# Patient Record
Sex: FEMALE | Race: WHITE | NOT HISPANIC OR LATINO | Employment: OTHER | URBAN - METROPOLITAN AREA
[De-identification: names, ages, dates, MRNs, and addresses within clinical notes are randomized per-mention and may not be internally consistent; named-entity substitution may affect disease eponyms.]

---

## 2017-04-13 ENCOUNTER — HOSPITAL ENCOUNTER (OUTPATIENT)
Dept: LAB | Facility: MEDICAL CENTER | Age: 82
End: 2017-04-13
Attending: FAMILY MEDICINE
Payer: MEDICARE

## 2017-04-13 LAB
ALBUMIN SERPL BCP-MCNC: 4.1 G/DL (ref 3.2–4.9)
ALBUMIN/GLOB SERPL: 1.5 G/DL
ALP SERPL-CCNC: 66 U/L (ref 30–99)
ALT SERPL-CCNC: 16 U/L (ref 2–50)
ANION GAP SERPL CALC-SCNC: 10 MMOL/L (ref 0–11.9)
AST SERPL-CCNC: 22 U/L (ref 12–45)
BASOPHILS # BLD AUTO: 0.3 % (ref 0–1.8)
BASOPHILS # BLD: 0.02 K/UL (ref 0–0.12)
BILIRUB SERPL-MCNC: 0.8 MG/DL (ref 0.1–1.5)
BUN SERPL-MCNC: 26 MG/DL (ref 8–22)
CALCIUM SERPL-MCNC: 9.4 MG/DL (ref 8.5–10.5)
CHLORIDE SERPL-SCNC: 106 MMOL/L (ref 96–112)
CHOLEST SERPL-MCNC: 163 MG/DL (ref 100–199)
CO2 SERPL-SCNC: 23 MMOL/L (ref 20–33)
CREAT SERPL-MCNC: 1.22 MG/DL (ref 0.5–1.4)
EOSINOPHIL # BLD AUTO: 0.08 K/UL (ref 0–0.51)
EOSINOPHIL NFR BLD: 1.1 % (ref 0–6.9)
ERYTHROCYTE [DISTWIDTH] IN BLOOD BY AUTOMATED COUNT: 42.6 FL (ref 35.9–50)
EST. AVERAGE GLUCOSE BLD GHB EST-MCNC: 111 MG/DL
GFR SERPL CREATININE-BSD FRML MDRD: 42 ML/MIN/1.73 M 2
GLOBULIN SER CALC-MCNC: 2.7 G/DL (ref 1.9–3.5)
GLUCOSE SERPL-MCNC: 112 MG/DL (ref 65–99)
HBA1C MFR BLD: 5.5 % (ref 0–5.6)
HCT VFR BLD AUTO: 43.9 % (ref 37–47)
HDLC SERPL-MCNC: 54 MG/DL
HGB BLD-MCNC: 14.2 G/DL (ref 12–16)
IMM GRANULOCYTES # BLD AUTO: 0.01 K/UL (ref 0–0.11)
IMM GRANULOCYTES NFR BLD AUTO: 0.1 % (ref 0–0.9)
LDLC SERPL CALC-MCNC: 78 MG/DL
LYMPHOCYTES # BLD AUTO: 2.58 K/UL (ref 1–4.8)
LYMPHOCYTES NFR BLD: 36.1 % (ref 22–41)
MCH RBC QN AUTO: 29.5 PG (ref 27–33)
MCHC RBC AUTO-ENTMCNC: 32.3 G/DL (ref 33.6–35)
MCV RBC AUTO: 91.3 FL (ref 81.4–97.8)
MONOCYTES # BLD AUTO: 0.89 K/UL (ref 0–0.85)
MONOCYTES NFR BLD AUTO: 12.4 % (ref 0–13.4)
NEUTROPHILS # BLD AUTO: 3.57 K/UL (ref 2–7.15)
NEUTROPHILS NFR BLD: 50 % (ref 44–72)
NRBC # BLD AUTO: 0 K/UL
NRBC BLD AUTO-RTO: 0 /100 WBC
PLATELET # BLD AUTO: 271 K/UL (ref 164–446)
PMV BLD AUTO: 11.7 FL (ref 9–12.9)
POTASSIUM SERPL-SCNC: 4.2 MMOL/L (ref 3.6–5.5)
PROT SERPL-MCNC: 6.8 G/DL (ref 6–8.2)
RBC # BLD AUTO: 4.81 M/UL (ref 4.2–5.4)
SODIUM SERPL-SCNC: 139 MMOL/L (ref 135–145)
TRIGL SERPL-MCNC: 157 MG/DL (ref 0–149)
TSH SERPL DL<=0.005 MIU/L-ACNC: 2.25 UIU/ML (ref 0.3–3.7)
WBC # BLD AUTO: 7.2 K/UL (ref 4.8–10.8)

## 2017-04-13 PROCEDURE — 85025 COMPLETE CBC W/AUTO DIFF WBC: CPT

## 2017-04-13 PROCEDURE — 36415 COLL VENOUS BLD VENIPUNCTURE: CPT

## 2017-04-13 PROCEDURE — 83036 HEMOGLOBIN GLYCOSYLATED A1C: CPT

## 2017-04-13 PROCEDURE — 84443 ASSAY THYROID STIM HORMONE: CPT

## 2017-04-13 PROCEDURE — 80053 COMPREHEN METABOLIC PANEL: CPT

## 2017-04-13 PROCEDURE — 80061 LIPID PANEL: CPT

## 2017-09-27 ENCOUNTER — HOSPITAL ENCOUNTER (OUTPATIENT)
Dept: LAB | Facility: MEDICAL CENTER | Age: 82
End: 2017-09-27
Attending: FAMILY MEDICINE
Payer: MEDICARE

## 2017-09-28 ENCOUNTER — HOSPITAL ENCOUNTER (OUTPATIENT)
Dept: LAB | Facility: MEDICAL CENTER | Age: 82
End: 2017-09-28
Attending: FAMILY MEDICINE
Payer: MEDICARE

## 2017-09-28 LAB
BASOPHILS # BLD AUTO: 0.6 % (ref 0–1.8)
BASOPHILS # BLD: 0.04 K/UL (ref 0–0.12)
CREAT UR-MCNC: 204.9 MG/DL
EOSINOPHIL # BLD AUTO: 0.09 K/UL (ref 0–0.51)
EOSINOPHIL NFR BLD: 1.4 % (ref 0–6.9)
ERYTHROCYTE [DISTWIDTH] IN BLOOD BY AUTOMATED COUNT: 47.8 FL (ref 35.9–50)
EST. AVERAGE GLUCOSE BLD GHB EST-MCNC: 123 MG/DL
HBA1C MFR BLD: 5.9 % (ref 0–5.6)
HCT VFR BLD AUTO: 43.7 % (ref 37–47)
HGB BLD-MCNC: 14.1 G/DL (ref 12–16)
IMM GRANULOCYTES # BLD AUTO: 0.02 K/UL (ref 0–0.11)
IMM GRANULOCYTES NFR BLD AUTO: 0.3 % (ref 0–0.9)
LYMPHOCYTES # BLD AUTO: 2.32 K/UL (ref 1–4.8)
LYMPHOCYTES NFR BLD: 36.3 % (ref 22–41)
MCH RBC QN AUTO: 29.4 PG (ref 27–33)
MCHC RBC AUTO-ENTMCNC: 32.3 G/DL (ref 33.6–35)
MCV RBC AUTO: 91.2 FL (ref 81.4–97.8)
MICROALBUMIN UR-MCNC: 2.5 MG/DL
MICROALBUMIN/CREAT UR: 12 MG/G (ref 0–30)
MONOCYTES # BLD AUTO: 0.65 K/UL (ref 0–0.85)
MONOCYTES NFR BLD AUTO: 10.2 % (ref 0–13.4)
NEUTROPHILS # BLD AUTO: 3.28 K/UL (ref 2–7.15)
NEUTROPHILS NFR BLD: 51.2 % (ref 44–72)
NRBC # BLD AUTO: 0 K/UL
NRBC BLD AUTO-RTO: 0 /100 WBC
PLATELET # BLD AUTO: 256 K/UL (ref 164–446)
PMV BLD AUTO: 11.1 FL (ref 9–12.9)
RBC # BLD AUTO: 4.79 M/UL (ref 4.2–5.4)
WBC # BLD AUTO: 6.4 K/UL (ref 4.8–10.8)

## 2017-09-28 PROCEDURE — 82043 UR ALBUMIN QUANTITATIVE: CPT

## 2017-09-28 PROCEDURE — 82570 ASSAY OF URINE CREATININE: CPT

## 2017-09-28 PROCEDURE — 85025 COMPLETE CBC W/AUTO DIFF WBC: CPT

## 2017-09-28 PROCEDURE — 36415 COLL VENOUS BLD VENIPUNCTURE: CPT

## 2017-09-28 PROCEDURE — 80053 COMPREHEN METABOLIC PANEL: CPT

## 2017-09-28 PROCEDURE — 83036 HEMOGLOBIN GLYCOSYLATED A1C: CPT

## 2017-09-28 PROCEDURE — 80061 LIPID PANEL: CPT

## 2017-09-29 LAB
ALBUMIN SERPL BCP-MCNC: 3.9 G/DL (ref 3.2–4.9)
ALBUMIN/GLOB SERPL: 1.5 G/DL
ALP SERPL-CCNC: 70 U/L (ref 30–99)
ALT SERPL-CCNC: 14 U/L (ref 2–50)
ANION GAP SERPL CALC-SCNC: 8 MMOL/L (ref 0–11.9)
AST SERPL-CCNC: 19 U/L (ref 12–45)
BILIRUB SERPL-MCNC: 0.6 MG/DL (ref 0.1–1.5)
BUN SERPL-MCNC: 24 MG/DL (ref 8–22)
CALCIUM SERPL-MCNC: 9.3 MG/DL (ref 8.5–10.5)
CHLORIDE SERPL-SCNC: 104 MMOL/L (ref 96–112)
CHOLEST SERPL-MCNC: 187 MG/DL (ref 100–199)
CO2 SERPL-SCNC: 25 MMOL/L (ref 20–33)
CREAT SERPL-MCNC: 1.22 MG/DL (ref 0.5–1.4)
GFR SERPL CREATININE-BSD FRML MDRD: 42 ML/MIN/1.73 M 2
GLOBULIN SER CALC-MCNC: 2.6 G/DL (ref 1.9–3.5)
GLUCOSE SERPL-MCNC: 111 MG/DL (ref 65–99)
HDLC SERPL-MCNC: 56 MG/DL
LDLC SERPL CALC-MCNC: 87 MG/DL
POTASSIUM SERPL-SCNC: 4.2 MMOL/L (ref 3.6–5.5)
PROT SERPL-MCNC: 6.5 G/DL (ref 6–8.2)
SODIUM SERPL-SCNC: 137 MMOL/L (ref 135–145)
TRIGL SERPL-MCNC: 219 MG/DL (ref 0–149)

## 2017-11-20 ENCOUNTER — HOSPITAL ENCOUNTER (OUTPATIENT)
Dept: RADIOLOGY | Facility: MEDICAL CENTER | Age: 82
End: 2017-11-20
Attending: FAMILY MEDICINE
Payer: MEDICARE

## 2017-11-20 DIAGNOSIS — Z12.31 VISIT FOR SCREENING MAMMOGRAM: ICD-10-CM

## 2017-11-20 PROCEDURE — G0202 SCR MAMMO BI INCL CAD: HCPCS

## 2018-02-28 ENCOUNTER — OFFICE VISIT (OUTPATIENT)
Dept: URGENT CARE | Facility: CLINIC | Age: 83
End: 2018-02-28
Payer: MEDICARE

## 2018-02-28 VITALS
HEIGHT: 66 IN | WEIGHT: 150.13 LBS | BODY MASS INDEX: 24.13 KG/M2 | OXYGEN SATURATION: 95 % | RESPIRATION RATE: 18 BRPM | HEART RATE: 88 BPM | SYSTOLIC BLOOD PRESSURE: 140 MMHG | TEMPERATURE: 97.7 F | DIASTOLIC BLOOD PRESSURE: 76 MMHG

## 2018-02-28 DIAGNOSIS — K13.79 ORAL PAIN OF UNKNOWN ETIOLOGY: ICD-10-CM

## 2018-02-28 PROCEDURE — 99203 OFFICE O/P NEW LOW 30 MIN: CPT | Performed by: PHYSICIAN ASSISTANT

## 2018-02-28 ASSESSMENT — ENCOUNTER SYMPTOMS
WEAKNESS: 0
NUMBNESS: 0
FEVER: 0
SWOLLEN GLANDS: 0
EYES NEGATIVE: 1
SORE THROAT: 0
CONSTITUTIONAL NEGATIVE: 1
NEUROLOGICAL NEGATIVE: 1

## 2018-02-28 NOTE — PROGRESS NOTES
"Subjective:      Jennifer Byrd is a 82 y.o. female who presents with Oral Pain (oral cavity pain, unable to chew)            Oral Pain   This is a new problem. The current episode started yesterday (oral pn; no tooth/gum/tongue/throat pain, just \"oral pain\"; no uri sx; hx same in past off/on but didn't last this long). The problem occurs constantly. The problem has been unchanged. Pertinent negatives include no fever, numbness, sore throat, swollen glands or weakness. The symptoms are aggravated by drinking and eating. She has tried rest for the symptoms. The treatment provided no relief.       Review of Systems   Constitutional: Negative.  Negative for fever.   HENT: Negative for sore throat.    Eyes: Negative.    Skin: Negative.    Neurological: Negative.  Negative for weakness and numbness.          Objective:     /76   Pulse 88   Temp 36.5 °C (97.7 °F)   Resp 18   Ht 1.676 m (5' 6\")   Wt 68.1 kg (150 lb 2.1 oz)   SpO2 95%   BMI 24.23 kg/m²      Physical Exam   Constitutional: She is oriented to person, place, and time. She appears well-developed and well-nourished. No distress.   HENT:   Head: Normocephalic.   Mouth/Throat: Oropharynx is clear and moist.   No tooth/gum/throat pain or obvious source   Eyes: EOM are normal. Pupils are equal, round, and reactive to light.   Neck: Normal range of motion. Neck supple.   Lymphadenopathy:     She has no cervical adenopathy.   Neurological: She is alert and oriented to person, place, and time.   Skin: Skin is warm and dry.   Psychiatric: She has a normal mood and affect. Her behavior is normal.   Nursing note and vitals reviewed.    Active Ambulatory Problems     Diagnosis Date Noted   • No Active Ambulatory Problems     Resolved Ambulatory Problems     Diagnosis Date Noted   • No Resolved Ambulatory Problems     No Additional Past Medical History     No current outpatient prescriptions on file prior to visit.     No current facility-administered " medications on file prior to visit.      Social History     Social History   • Marital status:      Spouse name: N/A   • Number of children: N/A   • Years of education: N/A     Occupational History   • Not on file.     Social History Main Topics   • Smoking status: Not on file   • Smokeless tobacco: Not on file   • Alcohol use Not on file   • Drug use: Unknown   • Sexual activity: Not on file     Other Topics Concern   • Not on file     Social History Narrative   • No narrative on file     History reviewed. No pertinent family history.  Patient has no allergy information on record.              Assessment/Plan:     ·  oral pain, ?etiol      · No obvious source of pain; will give t-3, mbx trial; f/u PCP; may need ENT refer.

## 2018-04-10 ENCOUNTER — HOSPITAL ENCOUNTER (OUTPATIENT)
Dept: LAB | Facility: MEDICAL CENTER | Age: 83
End: 2018-04-10
Attending: FAMILY MEDICINE
Payer: MEDICARE

## 2018-04-10 LAB
ALBUMIN SERPL BCP-MCNC: 4 G/DL (ref 3.2–4.9)
ALBUMIN/GLOB SERPL: 1.3 G/DL
ALP SERPL-CCNC: 75 U/L (ref 30–99)
ALT SERPL-CCNC: 12 U/L (ref 2–50)
ANION GAP SERPL CALC-SCNC: 12 MMOL/L (ref 0–11.9)
AST SERPL-CCNC: 19 U/L (ref 12–45)
BASOPHILS # BLD AUTO: 0.4 % (ref 0–1.8)
BASOPHILS # BLD: 0.05 K/UL (ref 0–0.12)
BILIRUB SERPL-MCNC: 0.8 MG/DL (ref 0.1–1.5)
BUN SERPL-MCNC: 20 MG/DL (ref 8–22)
CALCIUM SERPL-MCNC: 9.5 MG/DL (ref 8.5–10.5)
CHLORIDE SERPL-SCNC: 101 MMOL/L (ref 96–112)
CHOLEST SERPL-MCNC: 130 MG/DL (ref 100–199)
CO2 SERPL-SCNC: 23 MMOL/L (ref 20–33)
CREAT SERPL-MCNC: 1.55 MG/DL (ref 0.5–1.4)
CREAT UR-MCNC: 360.7 MG/DL
EOSINOPHIL # BLD AUTO: 0.14 K/UL (ref 0–0.51)
EOSINOPHIL NFR BLD: 1.2 % (ref 0–6.9)
ERYTHROCYTE [DISTWIDTH] IN BLOOD BY AUTOMATED COUNT: 49.5 FL (ref 35.9–50)
EST. AVERAGE GLUCOSE BLD GHB EST-MCNC: 111 MG/DL
GLOBULIN SER CALC-MCNC: 3 G/DL (ref 1.9–3.5)
GLUCOSE SERPL-MCNC: 109 MG/DL (ref 65–99)
HBA1C MFR BLD: 5.5 % (ref 0–5.6)
HCT VFR BLD AUTO: 43 % (ref 37–47)
HDLC SERPL-MCNC: 54 MG/DL
HGB BLD-MCNC: 13.7 G/DL (ref 12–16)
IMM GRANULOCYTES # BLD AUTO: 0.05 K/UL (ref 0–0.11)
IMM GRANULOCYTES NFR BLD AUTO: 0.4 % (ref 0–0.9)
LDLC SERPL CALC-MCNC: 51 MG/DL
LYMPHOCYTES # BLD AUTO: 2.64 K/UL (ref 1–4.8)
LYMPHOCYTES NFR BLD: 21.7 % (ref 22–41)
MCH RBC QN AUTO: 29.8 PG (ref 27–33)
MCHC RBC AUTO-ENTMCNC: 31.9 G/DL (ref 33.6–35)
MCV RBC AUTO: 93.7 FL (ref 81.4–97.8)
MICROALBUMIN UR-MCNC: 19.5 MG/DL
MICROALBUMIN/CREAT UR: 54 MG/G (ref 0–30)
MONOCYTES # BLD AUTO: 1.45 K/UL (ref 0–0.85)
MONOCYTES NFR BLD AUTO: 11.9 % (ref 0–13.4)
NEUTROPHILS # BLD AUTO: 7.83 K/UL (ref 2–7.15)
NEUTROPHILS NFR BLD: 64.4 % (ref 44–72)
NRBC # BLD AUTO: 0 K/UL
NRBC BLD-RTO: 0 /100 WBC
PLATELET # BLD AUTO: 291 K/UL (ref 164–446)
PMV BLD AUTO: 11.5 FL (ref 9–12.9)
POTASSIUM SERPL-SCNC: 4.3 MMOL/L (ref 3.6–5.5)
PROT SERPL-MCNC: 7 G/DL (ref 6–8.2)
RBC # BLD AUTO: 4.59 M/UL (ref 4.2–5.4)
SODIUM SERPL-SCNC: 136 MMOL/L (ref 135–145)
TRIGL SERPL-MCNC: 126 MG/DL (ref 0–149)
WBC # BLD AUTO: 12.2 K/UL (ref 4.8–10.8)

## 2018-04-10 PROCEDURE — 85025 COMPLETE CBC W/AUTO DIFF WBC: CPT

## 2018-04-10 PROCEDURE — 83036 HEMOGLOBIN GLYCOSYLATED A1C: CPT

## 2018-04-10 PROCEDURE — 80053 COMPREHEN METABOLIC PANEL: CPT

## 2018-04-10 PROCEDURE — 36415 COLL VENOUS BLD VENIPUNCTURE: CPT

## 2018-04-10 PROCEDURE — 80061 LIPID PANEL: CPT

## 2018-04-10 PROCEDURE — 82043 UR ALBUMIN QUANTITATIVE: CPT

## 2018-04-10 PROCEDURE — 82570 ASSAY OF URINE CREATININE: CPT

## 2018-07-02 ENCOUNTER — HOSPITAL ENCOUNTER (OUTPATIENT)
Dept: LAB | Facility: MEDICAL CENTER | Age: 83
End: 2018-07-02
Attending: FAMILY MEDICINE
Payer: MEDICARE

## 2018-07-02 LAB
BASOPHILS # BLD AUTO: 0.4 % (ref 0–1.8)
BASOPHILS # BLD: 0.03 K/UL (ref 0–0.12)
EOSINOPHIL # BLD AUTO: 0.06 K/UL (ref 0–0.51)
EOSINOPHIL NFR BLD: 0.7 % (ref 0–6.9)
ERYTHROCYTE [DISTWIDTH] IN BLOOD BY AUTOMATED COUNT: 48.8 FL (ref 35.9–50)
HCT VFR BLD AUTO: 44.2 % (ref 37–47)
HGB BLD-MCNC: 14.2 G/DL (ref 12–16)
IMM GRANULOCYTES # BLD AUTO: 0.03 K/UL (ref 0–0.11)
IMM GRANULOCYTES NFR BLD AUTO: 0.4 % (ref 0–0.9)
LYMPHOCYTES # BLD AUTO: 2.63 K/UL (ref 1–4.8)
LYMPHOCYTES NFR BLD: 32 % (ref 22–41)
MCH RBC QN AUTO: 29.9 PG (ref 27–33)
MCHC RBC AUTO-ENTMCNC: 32.1 G/DL (ref 33.6–35)
MCV RBC AUTO: 93.1 FL (ref 81.4–97.8)
MONOCYTES # BLD AUTO: 0.8 K/UL (ref 0–0.85)
MONOCYTES NFR BLD AUTO: 9.7 % (ref 0–13.4)
NEUTROPHILS # BLD AUTO: 4.67 K/UL (ref 2–7.15)
NEUTROPHILS NFR BLD: 56.8 % (ref 44–72)
NRBC # BLD AUTO: 0 K/UL
NRBC BLD-RTO: 0 /100 WBC
PLATELET # BLD AUTO: 265 K/UL (ref 164–446)
PMV BLD AUTO: 11.2 FL (ref 9–12.9)
RBC # BLD AUTO: 4.75 M/UL (ref 4.2–5.4)
T4 FREE SERPL-MCNC: 0.99 NG/DL (ref 0.53–1.43)
TSH SERPL DL<=0.005 MIU/L-ACNC: 2.33 UIU/ML (ref 0.38–5.33)
WBC # BLD AUTO: 8.2 K/UL (ref 4.8–10.8)

## 2018-07-02 PROCEDURE — 85025 COMPLETE CBC W/AUTO DIFF WBC: CPT

## 2018-07-02 PROCEDURE — 84439 ASSAY OF FREE THYROXINE: CPT

## 2018-07-02 PROCEDURE — 36415 COLL VENOUS BLD VENIPUNCTURE: CPT

## 2018-07-02 PROCEDURE — 84443 ASSAY THYROID STIM HORMONE: CPT

## 2018-11-28 ENCOUNTER — HOSPITAL ENCOUNTER (OUTPATIENT)
Dept: RADIOLOGY | Facility: MEDICAL CENTER | Age: 83
End: 2018-11-28
Attending: FAMILY MEDICINE
Payer: MEDICARE

## 2018-11-28 DIAGNOSIS — Z12.39 SCREENING BREAST EXAMINATION: ICD-10-CM

## 2018-11-28 DIAGNOSIS — N95.9 MENOPAUSAL DISORDER: ICD-10-CM

## 2018-11-28 PROCEDURE — 77067 SCR MAMMO BI INCL CAD: CPT

## 2018-11-28 PROCEDURE — 77080 DXA BONE DENSITY AXIAL: CPT

## 2019-04-08 ENCOUNTER — HOSPITAL ENCOUNTER (OUTPATIENT)
Dept: LAB | Facility: MEDICAL CENTER | Age: 84
End: 2019-04-08
Attending: FAMILY MEDICINE
Payer: MEDICARE

## 2019-04-08 LAB
ALBUMIN SERPL BCP-MCNC: 4.2 G/DL (ref 3.2–4.9)
ALBUMIN/GLOB SERPL: 1.8 G/DL
ALP SERPL-CCNC: 61 U/L (ref 30–99)
ALT SERPL-CCNC: 19 U/L (ref 2–50)
ANION GAP SERPL CALC-SCNC: 8 MMOL/L (ref 0–11.9)
AST SERPL-CCNC: 27 U/L (ref 12–45)
BASOPHILS # BLD AUTO: 0.5 % (ref 0–1.8)
BASOPHILS # BLD: 0.04 K/UL (ref 0–0.12)
BILIRUB SERPL-MCNC: 0.6 MG/DL (ref 0.1–1.5)
BUN SERPL-MCNC: 18 MG/DL (ref 8–22)
CALCIUM SERPL-MCNC: 9.1 MG/DL (ref 8.5–10.5)
CHLORIDE SERPL-SCNC: 105 MMOL/L (ref 96–112)
CHOLEST SERPL-MCNC: 172 MG/DL (ref 100–199)
CO2 SERPL-SCNC: 25 MMOL/L (ref 20–33)
CREAT SERPL-MCNC: 1.15 MG/DL (ref 0.5–1.4)
CREAT UR-MCNC: 113 MG/DL
EOSINOPHIL # BLD AUTO: 0.07 K/UL (ref 0–0.51)
EOSINOPHIL NFR BLD: 1 % (ref 0–6.9)
ERYTHROCYTE [DISTWIDTH] IN BLOOD BY AUTOMATED COUNT: 49.4 FL (ref 35.9–50)
EST. AVERAGE GLUCOSE BLD GHB EST-MCNC: 123 MG/DL
FASTING STATUS PATIENT QL REPORTED: NORMAL
GLOBULIN SER CALC-MCNC: 2.3 G/DL (ref 1.9–3.5)
GLUCOSE SERPL-MCNC: 107 MG/DL (ref 65–99)
HBA1C MFR BLD: 5.9 % (ref 0–5.6)
HCT VFR BLD AUTO: 43.8 % (ref 37–47)
HDLC SERPL-MCNC: 68 MG/DL
HGB BLD-MCNC: 14.1 G/DL (ref 12–16)
IMM GRANULOCYTES # BLD AUTO: 0.02 K/UL (ref 0–0.11)
IMM GRANULOCYTES NFR BLD AUTO: 0.3 % (ref 0–0.9)
LDLC SERPL CALC-MCNC: 69 MG/DL
LYMPHOCYTES # BLD AUTO: 2.44 K/UL (ref 1–4.8)
LYMPHOCYTES NFR BLD: 33.5 % (ref 22–41)
MCH RBC QN AUTO: 30.4 PG (ref 27–33)
MCHC RBC AUTO-ENTMCNC: 32.2 G/DL (ref 33.6–35)
MCV RBC AUTO: 94.4 FL (ref 81.4–97.8)
MICROALBUMIN UR-MCNC: 3.2 MG/DL
MICROALBUMIN/CREAT UR: 28 MG/G (ref 0–30)
MONOCYTES # BLD AUTO: 0.73 K/UL (ref 0–0.85)
MONOCYTES NFR BLD AUTO: 10 % (ref 0–13.4)
NEUTROPHILS # BLD AUTO: 3.99 K/UL (ref 2–7.15)
NEUTROPHILS NFR BLD: 54.7 % (ref 44–72)
NRBC # BLD AUTO: 0 K/UL
NRBC BLD-RTO: 0 /100 WBC
PLATELET # BLD AUTO: 240 K/UL (ref 164–446)
PMV BLD AUTO: 11.2 FL (ref 9–12.9)
POTASSIUM SERPL-SCNC: 4.2 MMOL/L (ref 3.6–5.5)
PROT SERPL-MCNC: 6.5 G/DL (ref 6–8.2)
RBC # BLD AUTO: 4.64 M/UL (ref 4.2–5.4)
SODIUM SERPL-SCNC: 138 MMOL/L (ref 135–145)
TRIGL SERPL-MCNC: 174 MG/DL (ref 0–149)
WBC # BLD AUTO: 7.3 K/UL (ref 4.8–10.8)

## 2019-04-08 PROCEDURE — 82570 ASSAY OF URINE CREATININE: CPT

## 2019-04-08 PROCEDURE — 85025 COMPLETE CBC W/AUTO DIFF WBC: CPT

## 2019-04-08 PROCEDURE — 36415 COLL VENOUS BLD VENIPUNCTURE: CPT

## 2019-04-08 PROCEDURE — 80061 LIPID PANEL: CPT

## 2019-04-08 PROCEDURE — 80053 COMPREHEN METABOLIC PANEL: CPT

## 2019-04-08 PROCEDURE — 83036 HEMOGLOBIN GLYCOSYLATED A1C: CPT

## 2019-04-08 PROCEDURE — 82043 UR ALBUMIN QUANTITATIVE: CPT

## 2019-07-03 ENCOUNTER — HOSPITAL ENCOUNTER (OUTPATIENT)
Dept: LAB | Facility: MEDICAL CENTER | Age: 84
End: 2019-07-03
Attending: FAMILY MEDICINE
Payer: MEDICARE

## 2019-07-03 LAB
ALBUMIN SERPL BCP-MCNC: 3.8 G/DL (ref 3.2–4.9)
ALBUMIN/GLOB SERPL: 1.4 G/DL
ALP SERPL-CCNC: 63 U/L (ref 30–99)
ALT SERPL-CCNC: 15 U/L (ref 2–50)
ANION GAP SERPL CALC-SCNC: 8 MMOL/L (ref 0–11.9)
AST SERPL-CCNC: 21 U/L (ref 12–45)
BASOPHILS # BLD AUTO: 0.4 % (ref 0–1.8)
BASOPHILS # BLD: 0.03 K/UL (ref 0–0.12)
BILIRUB SERPL-MCNC: 0.7 MG/DL (ref 0.1–1.5)
BUN SERPL-MCNC: 15 MG/DL (ref 8–22)
CALCIUM SERPL-MCNC: 9.2 MG/DL (ref 8.5–10.5)
CHLORIDE SERPL-SCNC: 103 MMOL/L (ref 96–112)
CHOLEST SERPL-MCNC: 166 MG/DL (ref 100–199)
CO2 SERPL-SCNC: 25 MMOL/L (ref 20–33)
CREAT SERPL-MCNC: 1.3 MG/DL (ref 0.5–1.4)
CREAT UR-MCNC: 351.1 MG/DL
EOSINOPHIL # BLD AUTO: 0.05 K/UL (ref 0–0.51)
EOSINOPHIL NFR BLD: 0.7 % (ref 0–6.9)
ERYTHROCYTE [DISTWIDTH] IN BLOOD BY AUTOMATED COUNT: 49.2 FL (ref 35.9–50)
EST. AVERAGE GLUCOSE BLD GHB EST-MCNC: 123 MG/DL
FASTING STATUS PATIENT QL REPORTED: NORMAL
GLOBULIN SER CALC-MCNC: 2.7 G/DL (ref 1.9–3.5)
GLUCOSE SERPL-MCNC: 136 MG/DL (ref 65–99)
HBA1C MFR BLD: 5.9 % (ref 0–5.6)
HCT VFR BLD AUTO: 44.4 % (ref 37–47)
HDLC SERPL-MCNC: 60 MG/DL
HGB BLD-MCNC: 13.9 G/DL (ref 12–16)
IMM GRANULOCYTES # BLD AUTO: 0.02 K/UL (ref 0–0.11)
IMM GRANULOCYTES NFR BLD AUTO: 0.3 % (ref 0–0.9)
LDLC SERPL CALC-MCNC: 73 MG/DL
LYMPHOCYTES # BLD AUTO: 2.19 K/UL (ref 1–4.8)
LYMPHOCYTES NFR BLD: 32.7 % (ref 22–41)
MCH RBC QN AUTO: 30 PG (ref 27–33)
MCHC RBC AUTO-ENTMCNC: 31.3 G/DL (ref 33.6–35)
MCV RBC AUTO: 95.7 FL (ref 81.4–97.8)
MICROALBUMIN UR-MCNC: 8.4 MG/DL
MICROALBUMIN/CREAT UR: 24 MG/G (ref 0–30)
MONOCYTES # BLD AUTO: 0.7 K/UL (ref 0–0.85)
MONOCYTES NFR BLD AUTO: 10.5 % (ref 0–13.4)
NEUTROPHILS # BLD AUTO: 3.7 K/UL (ref 2–7.15)
NEUTROPHILS NFR BLD: 55.4 % (ref 44–72)
NRBC # BLD AUTO: 0 K/UL
NRBC BLD-RTO: 0 /100 WBC
PLATELET # BLD AUTO: 241 K/UL (ref 164–446)
PMV BLD AUTO: 11.1 FL (ref 9–12.9)
POTASSIUM SERPL-SCNC: 4 MMOL/L (ref 3.6–5.5)
PROT SERPL-MCNC: 6.5 G/DL (ref 6–8.2)
RBC # BLD AUTO: 4.64 M/UL (ref 4.2–5.4)
SODIUM SERPL-SCNC: 136 MMOL/L (ref 135–145)
TRIGL SERPL-MCNC: 167 MG/DL (ref 0–149)
WBC # BLD AUTO: 6.7 K/UL (ref 4.8–10.8)

## 2019-07-03 PROCEDURE — 82570 ASSAY OF URINE CREATININE: CPT

## 2019-07-03 PROCEDURE — 80061 LIPID PANEL: CPT

## 2019-07-03 PROCEDURE — 80053 COMPREHEN METABOLIC PANEL: CPT

## 2019-07-03 PROCEDURE — 36415 COLL VENOUS BLD VENIPUNCTURE: CPT

## 2019-07-03 PROCEDURE — 85025 COMPLETE CBC W/AUTO DIFF WBC: CPT

## 2019-07-03 PROCEDURE — 82043 UR ALBUMIN QUANTITATIVE: CPT

## 2019-07-03 PROCEDURE — 83036 HEMOGLOBIN GLYCOSYLATED A1C: CPT

## 2019-10-16 ENCOUNTER — HOSPITAL ENCOUNTER (OUTPATIENT)
Dept: LAB | Facility: MEDICAL CENTER | Age: 84
End: 2019-10-16
Attending: FAMILY MEDICINE
Payer: MEDICARE

## 2019-10-16 PROCEDURE — 36415 COLL VENOUS BLD VENIPUNCTURE: CPT

## 2019-10-16 PROCEDURE — 83036 HEMOGLOBIN GLYCOSYLATED A1C: CPT

## 2019-10-18 LAB
EST. AVERAGE GLUCOSE BLD GHB EST-MCNC: 117 MG/DL
HBA1C MFR BLD: 5.7 % (ref 0–5.6)

## 2020-10-05 ENCOUNTER — APPOINTMENT (OUTPATIENT)
Dept: RADIOLOGY | Facility: MEDICAL CENTER | Age: 85
End: 2020-10-05
Attending: EMERGENCY MEDICINE
Payer: MEDICARE

## 2020-10-05 ENCOUNTER — APPOINTMENT (OUTPATIENT)
Dept: CARDIOLOGY | Facility: MEDICAL CENTER | Age: 85
End: 2020-10-05
Attending: INTERNAL MEDICINE
Payer: MEDICARE

## 2020-10-05 ENCOUNTER — HOSPITAL ENCOUNTER (OUTPATIENT)
Facility: MEDICAL CENTER | Age: 85
End: 2020-10-06
Attending: EMERGENCY MEDICINE | Admitting: INTERNAL MEDICINE
Payer: MEDICARE

## 2020-10-05 DIAGNOSIS — R06.02 SOB (SHORTNESS OF BREATH): ICD-10-CM

## 2020-10-05 DIAGNOSIS — R06.02 SHORTNESS OF BREATH: ICD-10-CM

## 2020-10-05 PROBLEM — N18.30 CKD (CHRONIC KIDNEY DISEASE), STAGE III: Status: ACTIVE | Noted: 2020-10-05

## 2020-10-05 PROBLEM — D72.829 LEUKOCYTOSIS: Status: ACTIVE | Noted: 2020-10-05

## 2020-10-05 LAB
ALBUMIN SERPL BCP-MCNC: 3.9 G/DL (ref 3.2–4.9)
ALBUMIN/GLOB SERPL: 1.5 G/DL
ALP SERPL-CCNC: 75 U/L (ref 30–99)
ALT SERPL-CCNC: 16 U/L (ref 2–50)
ANION GAP SERPL CALC-SCNC: 16 MMOL/L (ref 7–16)
AST SERPL-CCNC: 24 U/L (ref 12–45)
BASOPHILS # BLD AUTO: 0.6 % (ref 0–1.8)
BASOPHILS # BLD: 0.07 K/UL (ref 0–0.12)
BILIRUB SERPL-MCNC: 0.6 MG/DL (ref 0.1–1.5)
BUN SERPL-MCNC: 13 MG/DL (ref 8–22)
CALCIUM SERPL-MCNC: 9 MG/DL (ref 8.5–10.5)
CHLORIDE SERPL-SCNC: 100 MMOL/L (ref 96–112)
CO2 SERPL-SCNC: 18 MMOL/L (ref 20–33)
COVID ORDER STATUS COVID19: NORMAL
CREAT SERPL-MCNC: 1.41 MG/DL (ref 0.5–1.4)
D DIMER PPP IA.FEU-MCNC: 0.47 UG/ML (FEU) (ref 0–0.5)
EKG IMPRESSION: NORMAL
EOSINOPHIL # BLD AUTO: 0.01 K/UL (ref 0–0.51)
EOSINOPHIL NFR BLD: 0.1 % (ref 0–6.9)
ERYTHROCYTE [DISTWIDTH] IN BLOOD BY AUTOMATED COUNT: 54.4 FL (ref 35.9–50)
GLOBULIN SER CALC-MCNC: 2.6 G/DL (ref 1.9–3.5)
GLUCOSE SERPL-MCNC: 130 MG/DL (ref 65–99)
HCT VFR BLD AUTO: 43.1 % (ref 37–47)
HGB BLD-MCNC: 14.2 G/DL (ref 12–16)
IMM GRANULOCYTES # BLD AUTO: 0.07 K/UL (ref 0–0.11)
IMM GRANULOCYTES NFR BLD AUTO: 0.6 % (ref 0–0.9)
LV EJECT FRACT  99904: 55
LV EJECT FRACT MOD 2C 99903: 48.42
LV EJECT FRACT MOD 4C 99902: 59.42
LV EJECT FRACT MOD BP 99901: 55.44
LYMPHOCYTES # BLD AUTO: 1.69 K/UL (ref 1–4.8)
LYMPHOCYTES NFR BLD: 14.2 % (ref 22–41)
MCH RBC QN AUTO: 33.4 PG (ref 27–33)
MCHC RBC AUTO-ENTMCNC: 32.9 G/DL (ref 33.6–35)
MCV RBC AUTO: 101.4 FL (ref 81.4–97.8)
MONOCYTES # BLD AUTO: 1.01 K/UL (ref 0–0.85)
MONOCYTES NFR BLD AUTO: 8.5 % (ref 0–13.4)
NEUTROPHILS # BLD AUTO: 9.06 K/UL (ref 2–7.15)
NEUTROPHILS NFR BLD: 76 % (ref 44–72)
NRBC # BLD AUTO: 0 K/UL
NRBC BLD-RTO: 0 /100 WBC
PLATELET # BLD AUTO: 251 K/UL (ref 164–446)
PMV BLD AUTO: 10.5 FL (ref 9–12.9)
POTASSIUM SERPL-SCNC: 4.1 MMOL/L (ref 3.6–5.5)
PROT SERPL-MCNC: 6.5 G/DL (ref 6–8.2)
RBC # BLD AUTO: 4.25 M/UL (ref 4.2–5.4)
SARS-COV-2 RNA RESP QL NAA+PROBE: NOTDETECTED
SODIUM SERPL-SCNC: 134 MMOL/L (ref 135–145)
SPECIMEN SOURCE: NORMAL
T4 FREE SERPL-MCNC: 1.16 NG/DL (ref 0.93–1.7)
TROPONIN T SERPL-MCNC: 16 NG/L (ref 6–19)
TSH SERPL DL<=0.005 MIU/L-ACNC: 3.27 UIU/ML (ref 0.38–5.33)
WBC # BLD AUTO: 11.9 K/UL (ref 4.8–10.8)

## 2020-10-05 PROCEDURE — 84484 ASSAY OF TROPONIN QUANT: CPT

## 2020-10-05 PROCEDURE — 700102 HCHG RX REV CODE 250 W/ 637 OVERRIDE(OP): Performed by: INTERNAL MEDICINE

## 2020-10-05 PROCEDURE — 700111 HCHG RX REV CODE 636 W/ 250 OVERRIDE (IP): Performed by: INTERNAL MEDICINE

## 2020-10-05 PROCEDURE — 80053 COMPREHEN METABOLIC PANEL: CPT

## 2020-10-05 PROCEDURE — U0003 INFECTIOUS AGENT DETECTION BY NUCLEIC ACID (DNA OR RNA); SEVERE ACUTE RESPIRATORY SYNDROME CORONAVIRUS 2 (SARS-COV-2) (CORONAVIRUS DISEASE [COVID-19]), AMPLIFIED PROBE TECHNIQUE, MAKING USE OF HIGH THROUGHPUT TECHNOLOGIES AS DESCRIBED BY CMS-2020-01-R: HCPCS

## 2020-10-05 PROCEDURE — 85025 COMPLETE CBC W/AUTO DIFF WBC: CPT

## 2020-10-05 PROCEDURE — 93005 ELECTROCARDIOGRAM TRACING: CPT

## 2020-10-05 PROCEDURE — 93005 ELECTROCARDIOGRAM TRACING: CPT | Performed by: EMERGENCY MEDICINE

## 2020-10-05 PROCEDURE — C9803 HOPD COVID-19 SPEC COLLECT: HCPCS | Performed by: EMERGENCY MEDICINE

## 2020-10-05 PROCEDURE — 84439 ASSAY OF FREE THYROXINE: CPT

## 2020-10-05 PROCEDURE — 85379 FIBRIN DEGRADATION QUANT: CPT

## 2020-10-05 PROCEDURE — 71045 X-RAY EXAM CHEST 1 VIEW: CPT

## 2020-10-05 PROCEDURE — 93306 TTE W/DOPPLER COMPLETE: CPT

## 2020-10-05 PROCEDURE — 99285 EMERGENCY DEPT VISIT HI MDM: CPT

## 2020-10-05 PROCEDURE — G0378 HOSPITAL OBSERVATION PER HR: HCPCS

## 2020-10-05 PROCEDURE — 84443 ASSAY THYROID STIM HORMONE: CPT

## 2020-10-05 PROCEDURE — A9270 NON-COVERED ITEM OR SERVICE: HCPCS | Performed by: INTERNAL MEDICINE

## 2020-10-05 PROCEDURE — 96372 THER/PROPH/DIAG INJ SC/IM: CPT

## 2020-10-05 PROCEDURE — 93306 TTE W/DOPPLER COMPLETE: CPT | Mod: 26 | Performed by: INTERNAL MEDICINE

## 2020-10-05 PROCEDURE — 99220 PR INITIAL OBSERVATION CARE,LEVL III: CPT | Performed by: INTERNAL MEDICINE

## 2020-10-05 RX ORDER — BISACODYL 10 MG
10 SUPPOSITORY, RECTAL RECTAL
Status: DISCONTINUED | OUTPATIENT
Start: 2020-10-05 | End: 2020-10-06 | Stop reason: HOSPADM

## 2020-10-05 RX ORDER — SIMVASTATIN 20 MG
1 TABLET ORAL DAILY
COMMUNITY
Start: 2020-09-30

## 2020-10-05 RX ORDER — SIMVASTATIN 20 MG
20 TABLET ORAL DAILY
Status: DISCONTINUED | OUTPATIENT
Start: 2020-10-05 | End: 2020-10-06 | Stop reason: HOSPADM

## 2020-10-05 RX ORDER — HEPARIN SODIUM 5000 [USP'U]/ML
5000 INJECTION, SOLUTION INTRAVENOUS; SUBCUTANEOUS EVERY 8 HOURS
Status: DISCONTINUED | OUTPATIENT
Start: 2020-10-05 | End: 2020-10-06 | Stop reason: HOSPADM

## 2020-10-05 RX ORDER — POLYETHYLENE GLYCOL 3350 17 G/17G
1 POWDER, FOR SOLUTION ORAL
Status: DISCONTINUED | OUTPATIENT
Start: 2020-10-05 | End: 2020-10-06 | Stop reason: HOSPADM

## 2020-10-05 RX ORDER — RALOXIFENE HYDROCHLORIDE 60 MG/1
60 TABLET, FILM COATED ORAL
COMMUNITY
Start: 2020-08-25

## 2020-10-05 RX ORDER — RALOXIFENE HYDROCHLORIDE 60 MG/1
60 TABLET, FILM COATED ORAL
Status: DISCONTINUED | OUTPATIENT
Start: 2020-10-05 | End: 2020-10-06 | Stop reason: HOSPADM

## 2020-10-05 RX ORDER — ONDANSETRON 2 MG/ML
4 INJECTION INTRAMUSCULAR; INTRAVENOUS EVERY 4 HOURS PRN
Status: DISCONTINUED | OUTPATIENT
Start: 2020-10-05 | End: 2020-10-06 | Stop reason: HOSPADM

## 2020-10-05 RX ORDER — ACETAMINOPHEN 325 MG/1
650 TABLET ORAL EVERY 6 HOURS PRN
Status: DISCONTINUED | OUTPATIENT
Start: 2020-10-05 | End: 2020-10-06 | Stop reason: HOSPADM

## 2020-10-05 RX ORDER — AMOXICILLIN 250 MG
2 CAPSULE ORAL 2 TIMES DAILY
Status: DISCONTINUED | OUTPATIENT
Start: 2020-10-05 | End: 2020-10-06 | Stop reason: HOSPADM

## 2020-10-05 RX ORDER — TRAZODONE HYDROCHLORIDE 50 MG/1
50 TABLET ORAL NIGHTLY
COMMUNITY

## 2020-10-05 RX ORDER — ENALAPRILAT 1.25 MG/ML
1.25 INJECTION INTRAVENOUS EVERY 6 HOURS PRN
Status: DISCONTINUED | OUTPATIENT
Start: 2020-10-05 | End: 2020-10-06 | Stop reason: HOSPADM

## 2020-10-05 RX ORDER — AMLODIPINE BESYLATE 5 MG/1
5 TABLET ORAL DAILY
COMMUNITY

## 2020-10-05 RX ORDER — ONDANSETRON 4 MG/1
4 TABLET, ORALLY DISINTEGRATING ORAL EVERY 4 HOURS PRN
Status: DISCONTINUED | OUTPATIENT
Start: 2020-10-05 | End: 2020-10-06 | Stop reason: HOSPADM

## 2020-10-05 RX ORDER — AMLODIPINE BESYLATE 5 MG/1
5 TABLET ORAL DAILY
Status: DISCONTINUED | OUTPATIENT
Start: 2020-10-05 | End: 2020-10-06 | Stop reason: HOSPADM

## 2020-10-05 RX ORDER — TRAZODONE HYDROCHLORIDE 50 MG/1
50 TABLET ORAL NIGHTLY
Status: DISCONTINUED | OUTPATIENT
Start: 2020-10-05 | End: 2020-10-06 | Stop reason: HOSPADM

## 2020-10-05 RX ADMIN — AMLODIPINE BESYLATE 5 MG: 5 TABLET ORAL at 16:37

## 2020-10-05 RX ADMIN — SIMVASTATIN 20 MG: 20 TABLET, FILM COATED ORAL at 16:37

## 2020-10-05 RX ADMIN — HEPARIN SODIUM 5000 UNITS: 5000 INJECTION, SOLUTION INTRAVENOUS; SUBCUTANEOUS at 21:43

## 2020-10-05 RX ADMIN — TRAZODONE HYDROCHLORIDE 50 MG: 50 TABLET ORAL at 21:43

## 2020-10-05 ASSESSMENT — ENCOUNTER SYMPTOMS
CHILLS: 0
INSOMNIA: 0
HEADACHES: 0
NECK PAIN: 0
SHORTNESS OF BREATH: 0
DIZZINESS: 0
VOMITING: 0
WEIGHT LOSS: 0
EYE PAIN: 0
HEARTBURN: 0
BLURRED VISION: 0
PALPITATIONS: 0
FOCAL WEAKNESS: 0
COUGH: 0
SPUTUM PRODUCTION: 1
NERVOUS/ANXIOUS: 0
EYE DISCHARGE: 0
MYALGIAS: 0
ABDOMINAL PAIN: 0
DEPRESSION: 0
NAUSEA: 0
BACK PAIN: 0
FEVER: 0
ORTHOPNEA: 0
SEIZURES: 0
DIARRHEA: 0
EYE REDNESS: 0
STRIDOR: 0

## 2020-10-05 ASSESSMENT — HEART SCORE
HISTORY: MODERATELY SUSPICIOUS
AGE: >64
ECG: NON-SPECIFIC REPOLARIZATION DISTURBANCE
RISK FACTORS: 1-2 RISK FACTORS
HEART SCORE: 6
TROPONIN: 1-3 TIMES NORMAL LIMIT

## 2020-10-05 ASSESSMENT — LIFESTYLE VARIABLES
TOTAL SCORE: 0
DOES PATIENT WANT TO STOP DRINKING: NO
ALCOHOL_USE: YES
EVER HAD A DRINK FIRST THING IN THE MORNING TO STEADY YOUR NERVES TO GET RID OF A HANGOVER: NO
AVERAGE NUMBER OF DAYS PER WEEK YOU HAVE A DRINK CONTAINING ALCOHOL: 5
EVER FELT BAD OR GUILTY ABOUT YOUR DRINKING: NO
TOTAL SCORE: 0
TOTAL SCORE: 0
HAVE PEOPLE ANNOYED YOU BY CRITICIZING YOUR DRINKING: NO
ON A TYPICAL DAY WHEN YOU DRINK ALCOHOL HOW MANY DRINKS DO YOU HAVE: 1
HAVE YOU EVER FELT YOU SHOULD CUT DOWN ON YOUR DRINKING: NO
HOW MANY TIMES IN THE PAST YEAR HAVE YOU HAD 5 OR MORE DRINKS IN A DAY: 0
CONSUMPTION TOTAL: NEGATIVE

## 2020-10-05 ASSESSMENT — FIBROSIS 4 INDEX
FIB4 SCORE: 2.03
FIB4 SCORE: 1.91

## 2020-10-05 ASSESSMENT — PATIENT HEALTH QUESTIONNAIRE - PHQ9
2. FEELING DOWN, DEPRESSED, IRRITABLE, OR HOPELESS: NOT AT ALL
SUM OF ALL RESPONSES TO PHQ9 QUESTIONS 1 AND 2: 0
1. LITTLE INTEREST OR PLEASURE IN DOING THINGS: NOT AT ALL
2. FEELING DOWN, DEPRESSED, IRRITABLE, OR HOPELESS: NOT AT ALL
1. LITTLE INTEREST OR PLEASURE IN DOING THINGS: NOT AT ALL
SUM OF ALL RESPONSES TO PHQ9 QUESTIONS 1 AND 2: 0

## 2020-10-05 NOTE — ED PROVIDER NOTES
ED Provider Note    Scribed for Lianne Sanchez M.D. by Sammy Herron. 10/5/2020  1:19 PM    Primary care provider: Mike Trejo M.D.  Means of arrival: Walk in  History obtained from: Patient  History limited by: None    CHIEF COMPLAINT  Chief Complaint   Patient presents with   • Shortness of Breath     x1 hr.      HPI  Jennifer Byrd is a 85 y.o. female who presents to the Emergency Department for evaluation of shortness of breath onset one hour. She says symptoms onset quickly, but says she feels improved now. She denies chest pain, dizziness, light headedness, or lower extremity swelling. She denies exacerbation of shortness of breath with lying down or with exertion. She denies history of heart attack, stroke or diabetes. She denies using home oxygen. Son notes the patient has not been wearing her mask correctly (nose out of mask) when out of the home.     PPE Note: I personally donned full PPE for all patient encounters during this visit, with an N95 respirator mask, gloves, and face shield.     Scribe remained outside the patient's room and did not have any contact with the patient for the duration of patient encounter.      REVIEW OF SYSTEMS  CARDIAC: no chest pain, no palpitations, no light headedness    PULMONARY: dyspnea. No cough, or congestion   Extremities: no lower extremity swelling    See history of present illness. All other systems are negative. C.    PAST MEDICAL HISTORY   High cholesterol    SURGICAL HISTORY  patient denies any surgical history    SOCIAL HISTORY  Social History     Tobacco Use   • Smoking status: Never Smoker   • Smokeless tobacco: Never Used   Substance Use Topics   • Alcohol use: Not Currently   • Drug use: Not Currently      Social History     Substance and Sexual Activity   Drug Use Not Currently       FAMILY HISTORY  History reviewed. No pertinent family history.    CURRENT MEDICATIONS  Home Medications     Reviewed by Schuyler B Collett, R.N. (Registered Nurse)  "on 10/05/20 at 1300  Med List Status: <None>   Medication Last Dose Status   Alum & Mag Hydroxide-Simeth (MBX) Suspension  Active   IBUPROFEN PO  Active   NON SPECIFIED  Active                ALLERGIES  No Known Allergies    PHYSICAL EXAM  VITAL SIGNS: /79   Pulse 79   Temp 36.3 °C (97.3 °F) (Temporal)   Resp 18   Ht 1.676 m (5' 6\")   Wt 65.8 kg (145 lb)   SpO2 97%   BMI 23.40 kg/m²     Constitutional: Well developed, Well nourished, No acute distress, Non-toxic appearance.   HEENT: Normocephalic, Atraumatic,  external ears normal, pharynx pink,  Mucous  Membranes moist, No rhinorrhea or mucosal edema  Eyes: PERRL, EOMI, Conjunctiva normal, No discharge.   Neck: Normal range of motion, No tenderness, Supple, No stridor.   Lymphatic: No lymphadenopathy    Cardiovascular: Regular Rate and Rhythm, No murmurs,  rubs, or gallops.   Thorax & Lungs: Lungs clear to auscultation bilaterally, No respiratory distress, No wheezes, rhales or rhonchi, No chest wall tenderness.   Abdomen: Bowel sounds normal, Soft, non tender, non distended,  No pulsatile masses., no rebound guarding or peritoneal signs.   Skin: Warm, Dry, No erythema, No rash,   Back:  No CVA tenderness,  No spinal tenderness, bony crepitance, step offs, or instability.   Neurologic: Alert & oriented x 3, Normal motor function, Normal sensory function, No focal deficits noted. Normal reflexes. Normal Cranial Nerves.  Extremities: Equal, intact distal pulses, No cyanosis, clubbing or edema,  No tenderness.   Musculoskeletal: Good range of motion in all major joints. No tenderness to palpation or major deformities noted.     DIAGNOSTIC STUDIES / PROCEDURES    LABS  Results for orders placed or performed during the hospital encounter of 10/05/20   CBC with Differential   Result Value Ref Range    WBC 11.9 (H) 4.8 - 10.8 K/uL    RBC 4.25 4.20 - 5.40 M/uL    Hemoglobin 14.2 12.0 - 16.0 g/dL    Hematocrit 43.1 37.0 - 47.0 %    .4 (H) 81.4 - 97.8 fL "    MCH 33.4 (H) 27.0 - 33.0 pg    MCHC 32.9 (L) 33.6 - 35.0 g/dL    RDW 54.4 (H) 35.9 - 50.0 fL    Platelet Count 251 164 - 446 K/uL    MPV 10.5 9.0 - 12.9 fL    Neutrophils-Polys 76.00 (H) 44.00 - 72.00 %    Lymphocytes 14.20 (L) 22.00 - 41.00 %    Monocytes 8.50 0.00 - 13.40 %    Eosinophils 0.10 0.00 - 6.90 %    Basophils 0.60 0.00 - 1.80 %    Immature Granulocytes 0.60 0.00 - 0.90 %    Nucleated RBC 0.00 /100 WBC    Neutrophils (Absolute) 9.06 (H) 2.00 - 7.15 K/uL    Lymphs (Absolute) 1.69 1.00 - 4.80 K/uL    Monos (Absolute) 1.01 (H) 0.00 - 0.85 K/uL    Eos (Absolute) 0.01 0.00 - 0.51 K/uL    Baso (Absolute) 0.07 0.00 - 0.12 K/uL    Immature Granulocytes (abs) 0.07 0.00 - 0.11 K/uL    NRBC (Absolute) 0.00 K/uL   Complete Metabolic Panel (CMP)   Result Value Ref Range    Sodium 134 (L) 135 - 145 mmol/L    Potassium 4.1 3.6 - 5.5 mmol/L    Chloride 100 96 - 112 mmol/L    Co2 18 (L) 20 - 33 mmol/L    Anion Gap 16.0 7.0 - 16.0    Glucose 130 (H) 65 - 99 mg/dL    Bun 13 8 - 22 mg/dL    Creatinine 1.41 (H) 0.50 - 1.40 mg/dL    Calcium 9.0 8.5 - 10.5 mg/dL    AST(SGOT) 24 12 - 45 U/L    ALT(SGPT) 16 2 - 50 U/L    Alkaline Phosphatase 75 30 - 99 U/L    Total Bilirubin 0.6 0.1 - 1.5 mg/dL    Albumin 3.9 3.2 - 4.9 g/dL    Total Protein 6.5 6.0 - 8.2 g/dL    Globulin 2.6 1.9 - 3.5 g/dL    A-G Ratio 1.5 g/dL   Troponin STAT   Result Value Ref Range    Troponin T 16 6 - 19 ng/L   D-Dimer   Result Value Ref Range    D-Dimer Screen 0.47 0.00 - 0.50 ug/mL (FEU)   COVID/SARS CoV-2 PCR    Specimen: Nasopharyngeal; Respirate   Result Value Ref Range    COVID Order Status Received    SARS-CoV-2, PCR (In-House)   Result Value Ref Range    SARS-CoV-2 Source NP Swab     SARS-CoV-2 by PCR NotDetected    ESTIMATED GFR   Result Value Ref Range    GFR If  43 (A) >60 mL/min/1.73 m 2    GFR If Non African American 35 (A) >60 mL/min/1.73 m 2   EKG (NOW)   Result Value Ref Range    Report       [facility]  Emergency Dept.    Test Date:  2020-10-05  Pt Name:    TUCKER GARCIA                Department: ER  MRN:        [id number]                      Room:  Gender:     Female                       Technician: [id number]  :        [id number]                   Requested By:ER TRIAGE PROTOCOL  Order #:    [id number]                    Reading MD: ROWDY OGLESBY MD    Measurements  Intervals                                Axis  Rate:       76                           P:          72  IA:         136                          QRS:        13  QRSD:       84                           T:          -64  QT:         420  QTc:        473    Interpretive Statements  SINUS RHYTHM  PROBABLE LEFT ATRIAL ABNORMALITY  EARLY PRECORDIAL R/S TRANSITION  NONSPECIFIC T ABNORMALITIES, INFERIOR LEADS  No previous ECG available for comparison  Electronically Signed On 10-5-2020 13:27:16 PDT by ROWDY OGLESBY MD        All labs reviewed by me.    EKG  12 lead EKG; Interpreted by emergency department physician as above.    RADIOLOGY  DX-CHEST-PORTABLE (1 VIEW)   Final Result      No acute cardiac or pulmonary abnormality is noted.        The radiologist's interpretation of all radiological studies have been reviewed by me.    COURSE & MEDICAL DECISION MAKING  Nursing notes, VS, PMSFHx reviewed in chart.    1:19 PM Patient seen and examined at bedside. I informed the patient of my plan to run diagnostic studies to evaluate their symptoms including imaging and labs. Patient verbalizes understanding and support with my plan of care.  Ordered DX-Chest, EKG, CBC with diff, CMP, Troponin STAT, D-Dimer, COVID/SARS CoV-2 PCR to evaluate her symptoms. The differential diagnoses include but are not limited to: Cardiac Ischemia, COVID, Bronchitis, Pneumonia, PE.     Heart Score is: 6    3:16 PM I spoke with the hospitalist Dr Steve who will admit the patient for cardiac monitoring and further evaluation.  I spoke with the patient and her son and they understand the  reason that she is to stay.  She does have a moderate heart score.  Currently she is breathing easily and resting comfortably.    FINAL IMPRESSION  1. Shortness of breath          Sammy NG (Scribe), am scribing for, and in the presence of, Lianne Sanchez M.D..    Electronically signed by: Sammy Herron (Scribe), 10/5/2020    ILianne M.D. personally performed the services described in this documentation, as scribed by Sammy Herron in my presence, and it is both accurate and complete.    The note accurately reflects work and decisions made by me.  Lianne Sanchez M.D.  10/5/2020  3:16 PM

## 2020-10-05 NOTE — ED NOTES
Med Rec [facility]  Allergies reviewed  No ORAL antibiotics in last 14 days    Patient does not know her medications, she stated she thinks she takes vitamins but has no idea what

## 2020-10-05 NOTE — ASSESSMENT & PLAN NOTE
Etiology unclear  Could be related to arrhythmia  Monitor on telemetry closely  Check TSH and free T4  D-dimer is normal  Ordered echocardiogram

## 2020-10-05 NOTE — ED TRIAGE NOTES
Jennifer Byrd presents to triage, wearing a mask, reporting:  Chief Complaint   Patient presents with   • Shortness of Breath     x1 hr.    Pt reports abrupt increase in rr. No increased WOB noted. Pt reports she has been out in public, that she has not been around any known covid contacts.   Pl[place] [facility]

## 2020-10-05 NOTE — ED NOTES
Floor called to come down for bedside report. Pt ambulated to restroom independently. Resting comfortably.

## 2020-10-05 NOTE — PROGRESS NOTES
Triage Note    Jennifer Byrd is a 85 y.o. female with Hx hyperlipidemia who presented to the ED with 1 hour of shortness of breath.  COVID tests negative.  No ST changes on EKG, troponin WNL.  Heart score 6 per EDP.      Discussed with Dr. Sanchez.  I requested hospitalist, Dr. Huddleston evaluate this patient for admission.

## 2020-10-05 NOTE — H&P
Hospital Medicine History & Physical Note    Date of Service  10/5/2020    Primary Care Physician  Mike Trejo M.D.    Consultants  none    Code Status  Full Code    Chief Complaint  Chief Complaint   Patient presents with   • Shortness of Breath     x1 hr.        History of Presenting Illness  85 y.o. female with past medical history of essential hypertension, dyslipidemia who presented 10/5/2020 with shortness of breath earlier today.  It lasted for about 1 hour and symptom is already resolved by the time she got to ER.  The patient denies any chest pain, palpitation, dizziness associated with it.  Chest x-ray was normal in ER.  Never been diagnosed with atrial fibrillation or any arrhythmia in her life.  She will be admitted for observation.    Review of Systems  Review of Systems   Constitutional: Negative for chills, fever and weight loss.   HENT: Negative for congestion and nosebleeds.    Eyes: Negative for blurred vision, pain, discharge and redness.   Respiratory: Positive for sputum production. Negative for cough, shortness of breath and stridor.    Cardiovascular: Negative for chest pain, palpitations and orthopnea.   Gastrointestinal: Negative for abdominal pain, diarrhea, heartburn, nausea and vomiting.   Genitourinary: Negative for dysuria, frequency and urgency.   Musculoskeletal: Negative for back pain, myalgias and neck pain.   Skin: Negative for itching and rash.   Neurological: Negative for dizziness, focal weakness, seizures and headaches.   Psychiatric/Behavioral: Negative for depression. The patient is not nervous/anxious and does not have insomnia.        Past Medical History  Hypertension, dyslipidemia    Surgical History  Reviewed and no pertinent past surgical history    Family History  Reviewed and no pertinent family history    Social History   reports that she has never smoked. She has never used smokeless tobacco. She reports previous alcohol use. She reports previous drug  use.    Allergies  No Known Allergies    Medications  Prior to Admission Medications   Prescriptions Last Dose Informant Patient Reported? Taking?   amLODIPine (NORVASC) 5 MG Tab  Patient's Home Pharmacy Yes Yes   Sig: Take 5 mg by mouth every day.   raloxifene (EVISTA) 60 MG Tab  Patient's Home Pharmacy Yes No   Sig: Take 60 mg by mouth every day.   simvastatin (ZOCOR) 20 MG Tab  Patient's Home Pharmacy Yes No   Sig: Take 1 Tab by mouth every day.   traZODone (DESYREL) 50 MG Tab  Patient's Home Pharmacy Yes Yes   Sig: Take 50 mg by mouth every evening.      Facility-Administered Medications: None       Physical Exam  Temp:  [36.3 °C (97.3 °F)] 36.3 °C (97.3 °F)  Pulse:  [79] 79  Resp:  [18] 18  BP: (143)/(79) 143/79  SpO2:  [97 %] 97 %    Physical Exam  Vitals signs reviewed.   Constitutional:       General: She is not in acute distress.     Appearance: Normal appearance.   HENT:      Head: Normocephalic and atraumatic.      Nose: No congestion or rhinorrhea.   Eyes:      Extraocular Movements: Extraocular movements intact.      Pupils: Pupils are equal, round, and reactive to light.   Neck:      Musculoskeletal: Normal range of motion and neck supple.   Cardiovascular:      Rate and Rhythm: Normal rate and regular rhythm.      Pulses: Normal pulses.   Pulmonary:      Effort: Pulmonary effort is normal. No respiratory distress.      Breath sounds: Normal breath sounds.   Abdominal:      General: Bowel sounds are normal. There is no distension.      Palpations: Abdomen is soft.      Tenderness: There is no abdominal tenderness.   Musculoskeletal:         General: No swelling or tenderness.   Skin:     General: Skin is warm.      Findings: No erythema.   Neurological:      General: No focal deficit present.      Mental Status: She is alert and oriented to person, place, and time.         Laboratory:  Recent Labs     10/05/20  1340   WBC 11.9*   RBC 4.25   HEMOGLOBIN 14.2   HEMATOCRIT 43.1   .4*   MCH 33.4*    MCHC 32.9*   RDW 54.4*   PLATELETCT 251   MPV 10.5     Recent Labs     10/05/20  1340   SODIUM 134*   POTASSIUM 4.1   CHLORIDE 100   CO2 18*   GLUCOSE 130*   BUN 13   CREATININE 1.41*   CALCIUM 9.0     Recent Labs     10/05/20  1340   ALTSGPT 16   ASTSGOT 24   ALKPHOSPHAT 75   TBILIRUBIN 0.6   GLUCOSE 130*         No results for input(s): NTPROBNP in the last 72 hours.      Recent Labs     10/05/20  1340   TROPONINT 16       Imaging:  DX-CHEST-PORTABLE (1 VIEW)   Final Result      No acute cardiac or pulmonary abnormality is noted.      EC-ECHOCARDIOGRAM COMPLETE W/O CONT    (Results Pending)         Assessment/Plan:  I anticipate this patient is appropriate for observation status at this time.    Leukocytosis- (present on admission)  Assessment & Plan  Monitor closely for possible infection  Chest x-ray is normal  The patient denies any dysuria    CKD (chronic kidney disease), stage III- (present on admission)  Assessment & Plan  Creatinine stable    SOB (shortness of breath)- (present on admission)  Assessment & Plan  Etiology unclear  Could be related to arrhythmia  Monitor on telemetry closely  Check TSH and free T4  D-dimer is normal  Ordered echocardiogram

## 2020-10-06 VITALS
OXYGEN SATURATION: 98 % | TEMPERATURE: 98.9 F | WEIGHT: 128.97 LBS | SYSTOLIC BLOOD PRESSURE: 137 MMHG | HEIGHT: 66 IN | BODY MASS INDEX: 20.73 KG/M2 | DIASTOLIC BLOOD PRESSURE: 68 MMHG | RESPIRATION RATE: 18 BRPM | HEART RATE: 80 BPM

## 2020-10-06 PROBLEM — I10 ESSENTIAL HYPERTENSION: Status: ACTIVE | Noted: 2020-10-06

## 2020-10-06 LAB
ALBUMIN SERPL BCP-MCNC: 3.7 G/DL (ref 3.2–4.9)
ALBUMIN/GLOB SERPL: 1.8 G/DL
ALP SERPL-CCNC: 69 U/L (ref 30–99)
ALT SERPL-CCNC: 15 U/L (ref 2–50)
ANION GAP SERPL CALC-SCNC: 12 MMOL/L (ref 7–16)
AST SERPL-CCNC: 22 U/L (ref 12–45)
BILIRUB SERPL-MCNC: 0.6 MG/DL (ref 0.1–1.5)
BUN SERPL-MCNC: 12 MG/DL (ref 8–22)
CALCIUM SERPL-MCNC: 8.6 MG/DL (ref 8.5–10.5)
CHLORIDE SERPL-SCNC: 102 MMOL/L (ref 96–112)
CO2 SERPL-SCNC: 22 MMOL/L (ref 20–33)
CREAT SERPL-MCNC: 1.18 MG/DL (ref 0.5–1.4)
ERYTHROCYTE [DISTWIDTH] IN BLOOD BY AUTOMATED COUNT: 52.3 FL (ref 35.9–50)
GLOBULIN SER CALC-MCNC: 2.1 G/DL (ref 1.9–3.5)
GLUCOSE SERPL-MCNC: 97 MG/DL (ref 65–99)
HCT VFR BLD AUTO: 39.6 % (ref 37–47)
HGB BLD-MCNC: 13.2 G/DL (ref 12–16)
MCH RBC QN AUTO: 33.4 PG (ref 27–33)
MCHC RBC AUTO-ENTMCNC: 33.3 G/DL (ref 33.6–35)
MCV RBC AUTO: 100.3 FL (ref 81.4–97.8)
PLATELET # BLD AUTO: 236 K/UL (ref 164–446)
PMV BLD AUTO: 10.3 FL (ref 9–12.9)
POTASSIUM SERPL-SCNC: 3.8 MMOL/L (ref 3.6–5.5)
PROT SERPL-MCNC: 5.8 G/DL (ref 6–8.2)
RBC # BLD AUTO: 3.95 M/UL (ref 4.2–5.4)
SODIUM SERPL-SCNC: 136 MMOL/L (ref 135–145)
WBC # BLD AUTO: 9 K/UL (ref 4.8–10.8)

## 2020-10-06 PROCEDURE — 700111 HCHG RX REV CODE 636 W/ 250 OVERRIDE (IP): Performed by: INTERNAL MEDICINE

## 2020-10-06 PROCEDURE — 700102 HCHG RX REV CODE 250 W/ 637 OVERRIDE(OP): Performed by: INTERNAL MEDICINE

## 2020-10-06 PROCEDURE — A9270 NON-COVERED ITEM OR SERVICE: HCPCS | Performed by: INTERNAL MEDICINE

## 2020-10-06 PROCEDURE — 80053 COMPREHEN METABOLIC PANEL: CPT

## 2020-10-06 PROCEDURE — G0378 HOSPITAL OBSERVATION PER HR: HCPCS

## 2020-10-06 PROCEDURE — 85027 COMPLETE CBC AUTOMATED: CPT

## 2020-10-06 PROCEDURE — 96372 THER/PROPH/DIAG INJ SC/IM: CPT

## 2020-10-06 PROCEDURE — 99217 PR OBSERVATION CARE DISCHARGE: CPT | Performed by: INTERNAL MEDICINE

## 2020-10-06 RX ORDER — ACETAMINOPHEN 325 MG/1
650 TABLET ORAL EVERY 6 HOURS PRN
Qty: 30 TAB | Refills: 0 | Status: SHIPPED | OUTPATIENT
Start: 2020-10-06

## 2020-10-06 RX ORDER — AMOXICILLIN 250 MG
2 CAPSULE ORAL 2 TIMES DAILY
Qty: 30 TAB | Refills: 0 | COMMUNITY
Start: 2020-10-06

## 2020-10-06 RX ORDER — POLYETHYLENE GLYCOL 3350 17 G/17G
POWDER, FOR SOLUTION ORAL
Refills: 3 | COMMUNITY
Start: 2020-10-06

## 2020-10-06 RX ADMIN — HEPARIN SODIUM 5000 UNITS: 5000 INJECTION, SOLUTION INTRAVENOUS; SUBCUTANEOUS at 05:22

## 2020-10-06 RX ADMIN — AMLODIPINE BESYLATE 5 MG: 5 TABLET ORAL at 05:22

## 2020-10-06 RX ADMIN — SIMVASTATIN 20 MG: 20 TABLET, FILM COATED ORAL at 05:22

## 2020-10-06 NOTE — DISCHARGE PLANNING
ATTN: Case Management  RE: Referral for Home Health    Reason for referral denial: we are unable to accept referrals with Dr Trejo as PCP as he will not sign Home Health orders              Unfortunately, we are not able to accept this referral for the reason listed above. If further clarity is needed, our Transitional Care Specialists are available to discuss any barriers to service at [phone].      We look forward to collaborating with you in the future,  [facility] Team

## 2020-10-06 NOTE — PROGRESS NOTES
"Patient resting quietly up in bed. Asking for something to eat which was provided. States that she is only here at the insistence of her son because her B/P was high, (159/64) now. She states that she has no problems at home with it and relates it to \"White coat syndrome\". Completely alert and oriented in every aspect, except that she has asked me several times when she will be able to get her saline lock out. I let her know that she would need to keep this until she is discharged. She has continued to ask me about it.  "

## 2020-10-06 NOTE — DISCHARGE PLANNING
Received Choice form at [id number]  Agency/Facility Name: [facility]  Referral sent per Choice form @ [phone]

## 2020-10-06 NOTE — DISCHARGE PLANNING
Received request for HHC. Spoke with patient at bedside and choice made for [facility]. Choice form signed by patient. Choice form faxed to Shahida Chisholm eform placed in chart and copy given to patient.

## 2020-10-06 NOTE — PROGRESS NOTES
Report received and assumed care of patient. Patient sitting up in bed with no distress noted. Patient stating she wants to go home today. Call bell in reach and safety measures in place.

## 2020-10-06 NOTE — DISCHARGE PLANNING
Care Transition Team Assessment    Spoke with patient at bedside. Lives in single story house one house away from son. PCP Dr. Trejo. Has SCP insurance. [facility] [place]. Anticipate HHC when medically cleared. Son will be ride @ D/C.        Information Source  Orientation : Oriented x 4    Readmission Evaluation  Is this a readmission?: No    Interdisciplinary Discharge Planning  Primary Care Physician: Dr Trejo  Lives with - Patient's Self Care Capacity: Alone and Able to Care For Self  Patient or legal guardian wants to designate a caregiver: No  Support Systems: Children  Housing / Facility: [address] [facility]  Do You Take your Prescribed Medications Regularly: Yes  Able to Return to Previous ADL's: Yes  Mobility Issues: No  Prior Services: Home-Independent  Patient Prefers to be Discharged to:: Home  Assistance Needed: No  Durable Medical Equipment: Not Applicable    Discharge Preparedness  What are your discharge supports?: Child  Prior Functional Level: Ambulatory    Functional Assesment  Prior Functional Level: Ambulatory    Finances  Prescription Coverage: Yes    Anticipated Discharge Information  [facility]   Discharge Contact Phone Number: [phone]

## 2020-10-06 NOTE — DISCHARGE PLANNING
Updated MD and APN on home Health being declined. Patient updated and is OK and will F/U with PCP.

## 2020-10-06 NOTE — PROGRESS NOTES
"On admit patient's /99. Patient states she has \"white coat\" and her BP is always elevated at doctors. Patient is asymptomatic. Over next 15 minutes patient's blood pressures are 186/82 and 174/77. MD made aware and new orders obtained for prn blood pressure medications.  "

## 2020-10-06 NOTE — DISCHARGE SUMMARY
Discharge Summary    CHIEF COMPLAINT ON ADMISSION  Chief Complaint   Patient presents with   • Shortness of Breath     x1 hr.        Reason for Admission  SOB     Admission Date  10/5/2020    CODE STATUS  Full Code    HPI & HOSPITAL COURSE  This is a 85 y.o. female here with Shortness of Breath (x1 hr. )    Please review Dr. Rashad Huddleston M.D. notes for further details of history of present illness, past medical/social/family histories, allergies and medications.   History of hypertensin and dyslipidemia.  Presented with Shortness of Breath (x1 hr. )  Her SOB already resolved at the ED. Denies any chest pain, palpitation, dizziness associated with it.  At the ED, she is afebrile, hemodynamically stable. NOT hypoxic.  CXR:  No acute cardiac or pulmonary abnormality is noted.  Mild leukocytosis.  Mildly elevated creatinine.  CoVID x 1 NEGATIVE  Troponin NEGATIVE  She was admitted to obs for further w/u  Echo:  Normal chamber sizes. Normal LV and RV systolic function. LVEF 55%   visually. Normal diastolic function. No significant valvular disease.   Estimated RVSP 25 mmHg. Trivial pericardial effusion. No prior study is   available for comparison.   She remained back to baseline and wants to go home.     Leukocytosis resolved on its own. Her kidney function remained stable. Because of hypertension and per son episodes of clinical hypertension, amlodipine was started.    I spoke with her son. She mentioned her friend noticed her to be short of breath at times. Patient however feels she is fine. We therefore recommended HHC. However per Case Management, HHC declined PCP. Since her son lives next door, patient opted and wanted to go home with family support. Son aware.    At discharge date, Jennifer Byrd afebrile and hemodynamically stable.  Jennifer Byrd wanted to be discharged today.    Discharge Physical Exam  General/Constitutional: No acute distress. Elderly  Head: Normocephalic, atraumatic  ENT: Oral  mucosa is moist. No obvious pharyngeal exudates  Eyes: Pink conjunctiva, no scleral icterus  Neck: Supple, no lymphadenopathy  Cardiovascular: Normal rate and regular rhythm. S1,2 noted. No murmurs, gallops or rubs.  Pulmonary: Clear to auscultation bilaterally. No wheezes, rales or rhonchi.  Abdominal: Soft, nontender, not distended, bowel sounds normoactive. No guarding or peritoneal signs.  Musculoskeletal: No tenderness to palpation of chest wall.  Neurologic: Mild cognitive deficits. Alert and oriented. Grossly nonfocal, moving all extremities.  Genitourinary: No gross hematuria  Skin: No obvious rash.  Psychiatric: Pleasant, cooperative.  Vitals Reviewed  Labs Reviewed  Imaging reviewed  Nursing notes reviewed      Imaging  EC-ECHOCARDIOGRAM COMPLETE W/O CONT   Final Result      DX-CHEST-PORTABLE (1 VIEW)   Final Result      No acute cardiac or pulmonary abnormality is noted.                     Therefore, she is discharged in good and stable condition to home with close outpatient follow-up.        Discharge Date  10/7/2020    FOLLOW UP ITEMS POST DISCHARGE      DISCHARGE DIAGNOSES  Active Problems:    SOB (shortness of breath) POA: Yes    CKD (chronic kidney disease), stage III POA: Yes    Leukocytosis POA: Yes        FOLLOW UP  Future Appointments   Date Time Provider Department Center   10/26/2020 11:00 AM LAB [facility] None     No follow-up provider specified.  Follow up Mike Trejo M.D. in 1 week [facility] nurse or patient to check blood pressures, respiratory status/pulse ox 2 times a day dieudonne if symptomatic. Return to ER in the event of new or worsening symptoms. Please note importance of compliance and the patient has agreed to proceed with all medical recommendations and follow up plan indicated above. All medications come with benefits and risks. Risks may include permanent injury or death and these risks can be minimized with close reassessment and monitoring. Please make it to your scheduled  follow ups with Mike Trejo M.D., and/or specialists clinic. Given current pandemic, advise mask use, social distancing and proper hygiene/hand washing  MEDICATIONS ON DISCHARGE     Medication List      START taking these medications      Instructions   acetaminophen 325 MG Tabs  Commonly known as: TYLENOL   Take 2 Tabs by mouth every 6 hours as needed (Mild Pain; (Pain scale 1-3); Temp greater than 100.5 F).  Dose: 650 mg     polyethylene glycol/lytes 17 g Pack  Commonly known as: MIRALAX   Take  by mouth 1 time daily as needed (if sennosides and docusate ineffective after 24 hours).     senna-docusate 8.6-50 MG Tabs  Commonly known as: PERICOLACE or SENOKOT S   Take 2 Tabs by mouth 2 Times a Day.  Dose: 2 Tab        CONTINUE taking these medications      Instructions   amLODIPine 5 MG Tabs  Commonly known as: NORVASC   Take 5 mg by mouth every day.  Dose: 5 mg     raloxifene 60 MG Tabs  Commonly known as: EVISTA   Take 60 mg by mouth every day.  Dose: 60 mg     simvastatin 20 MG Tabs  Commonly known as: ZOCOR   Take 1 Tab by mouth every day.  Dose: 1 Tab     traZODone 50 MG Tabs  Commonly known as: DESYREL   Take 50 mg by mouth every evening.  Dose: 50 mg            Allergies  No Known Allergies    DIET  Orders Placed This Encounter   Procedures   • Diet Order Regular     Standing Status:   Standing     Number of Occurrences:   1     Order Specific Question:   Diet:     Answer:   Regular [1]       ACTIVITY  No heavy lifting or strenuous activity    CONSULTATIONS      PROCEDURES  Dx-chest-portable (1 View)    Result Date: 10/5/2020  10/5/2020 1:36 PM HISTORY/REASON FOR EXAM:  Chest Pain TECHNIQUE/EXAM DESCRIPTION AND NUMBER OF VIEWS: Single portable view of the chest. COMPARISON:  None FINDINGS: The cardiac silhouette is within normal limits. No infiltrates or consolidations are noted. No pleural effusion is noted. There is mild upper thoracic scoliosis convex left.     No acute cardiac or pulmonary abnormality  is noted.    Ec-echocardiogram Complete W/o Cont    Result Date: 10/5/2020  Transthoracic Echo Report Echocardiography Laboratory CONCLUSIONS Normal chamber sizes. Normal LV and RV systolic function. LVEF 55% visually. Normal diastolic function. No significant valvular disease. Estimated RVSP 25 mmHg. Trivial pericardial effusion. No prior study is available for comparison. TUCKER GARCIA Exam Date:         10/05/2020                    19:07 Exam Location:     Inpatient Priority:          Routine Ordering Physician:        MICHAEL GREENBERG Referring Physician:       DIONICIO Garza Sonographer:               Amie Cerna RDCS Age:    85     Gender:    F MRN:    [id number] :    1935 BSA:    1.74   Ht (in):    66     Wt (lb):    145 Exam Type:     Complete Indications:     Shortness of breath ICD Codes:       R06.02 CPT Codes:       05999 BP:   143    /   79     HR:   75 Technical Quality: MEASUREMENTS  (Male / Female) Normal Values 2D ECHO LV Diastolic Diameter PLAX        3.4 cm                4.2 - 5.9 / 3.9 - 5.3 cm LV Systolic Diameter PLAX         2.3 cm                2.1 - 4.0 cm IVS Diastolic Thickness           0.95 cm               LVPW Diastolic Thickness          1.2 cm                LVOT Diameter                     1.7 cm                Estimated LV Ejection Fraction    55 %                  LV Ejection Fraction MOD BP       55.4 %                >= 55  % LV Ejection Fraction MOD 4C       59.4 %                LV Ejection Fraction MOD 2C       48.4 %                DOPPLER AV Peak Velocity                  1 m/s                 AV Peak Gradient                  4.2 mmHg              AV Mean Gradient                  2.4 mmHg              LVOT Peak Velocity                0.83 m/s              AV Area Cont Eq vti               2 cm2                 Mitral E Point Velocity           0.5 m/s               Mitral E to A Ratio               0.74                  MV Pressure Half Time             66 ms                  MV Area PHT                       3.3 cm2               MV Deceleration Time              228 ms                TV Peak E Velocity                0.32 m/s              TR Peak Velocity                  242 cm/s              MV E' Velocity                    6.4 cm/s              * Indicates values subject to auto-interpretation LV EF:  55    % FINDINGS Left Ventricle Normal left ventricular chamber size. Normal left ventricular wall thickness. Normal left ventricular systolic function. Left ventricular ejection fraction is visually estimated to be 55%. Normal regional wall motion. Normal diastolic function. Right Ventricle The right ventricle was normal in size and function. Right Atrium The right atrium is normal in size. Inferior vena cava is not well visualized. Left Atrium The left atrium is normal in size. Left atrial volume index is 19 mL/sq m. Mitral Valve Structurally normal mitral valve without significant stenosis or regurgitation. Aortic Valve Structurally normal aortic valve without significant stenosis or regurgitation. Tricuspid Valve No tricuspid stenosis. Mild to moderate tricuspid regurgitation. Estimated right ventricular systolic pressure  is 25 mmHg. Pulmonic Valve Structurally normal pulmonic valve without significant stenosis or regurgitation. Pericardium Trivial pericardial effusion. Aorta The aortic root is normal. Ascending aorta diameter is 2.6 cm. Nalini Bean MD (Electronically Signed) Final Date:     05 October 2020                 23:29      LABORATORY  Lab Results   Component Value Date    SODIUM 136 10/06/2020    POTASSIUM 3.8 10/06/2020    CHLORIDE 102 10/06/2020    CO2 22 10/06/2020    GLUCOSE 97 10/06/2020    BUN 12 10/06/2020    CREATININE 1.18 10/06/2020        Lab Results   Component Value Date    WBC 9.0 10/06/2020    HEMOGLOBIN 13.2 10/06/2020    HEMATOCRIT 39.6 10/06/2020    PLATELETCT 236 10/06/2020        Total time of the discharge process exceeds 35 minutes.

## 2020-10-06 NOTE — DISCHARGE INSTRUCTIONS
Return to ER in the event of new or worsening symptoms. Please note importance of compliance and the patient has agreed to proceed with all medical recommendations and follow up plan indicated above. All medications come with benefits and risks. Risks may include permanent injury or death and these risks can be minimized with close reassessment and monitoring. Please make it to your scheduled follow ups with Mike Trejo M.D., and/or specialists clinic.   Given current pandemic, advise mask use, social distancing and proper hygiene/hand washing    Discharge Instructions    Discharged to home by car with relative. Discharged via wheelchair, hospital escort: Yes.  Special equipment needed: Not Applicable    Be sure to schedule a follow-up appointment with your primary care doctor or any specialists as instructed.     Discharge Plan:   Diet Plan: Discussed  Activity Level: Discussed  Confirmed Follow up Appointment: Patient to Call and Schedule Appointment  Confirmed Symptoms Management: Discussed  Medication Reconciliation Updated: Yes  Influenza Vaccine Indication: Not indicated: Previously immunized this influenza season and > 8 years of age    I understand that a diet low in cholesterol, fat, and sodium is recommended for good health. Unless I have been given specific instructions below for another diet, I accept this instruction as my diet prescription.   Other diet: regular    Special Instructions: None    · Is patient discharged on Warfarin / Coumadin?   No     Depression / Suicide Risk    As you are discharged from this [facility] facility, it is important to learn how to keep safe from harming yourself.    Recognize the warning signs:  · Abrupt changes in personality, positive or negative- including increase in energy   · Giving away possessions  · Change in eating patterns- significant weight changes-  positive or negative  · Change in sleeping patterns- unable to sleep or sleeping all the  time   · Unwillingness or inability to communicate  · Depression  · Unusual sadness, discouragement and loneliness  · Talk of wanting to die  · Neglect of personal appearance   · Rebelliousness- reckless behavior  · Withdrawal from people/activities they love  · Confusion- inability to concentrate     If you or a loved one observes any of these behaviors or has concerns about self-harm, here's what you can do:  · Talk about it- your feelings and reasons for harming yourself  · Remove any means that you might use to hurt yourself (examples: pills, rope, extension cords, firearm)  · Get professional help from the community (Mental Health, Substance Abuse, psychological counseling)  · Do not be alone:Call your Safe Contact- someone whom you trust who will be there for you.  · Call your local CRISIS HOTLINE 605-6287 or 911-967-7679  · Call your local Children's Mobile Crisis Response Team [place] (221) 587-6145 or www.NewsPin  · Call the toll free National Suicide Prevention Hotlines   · National Suicide Prevention Lifeline 210-690-CVBO (1814)  · National Hope Line Network 800-SUICIDE (816-2865)

## 2020-10-06 NOTE — FACE TO FACE
Face to Face Supporting Documentation - Home Health    The encounter with this patient was in whole or in part the primary reason for home health admission.    Date of encounter:   Patient:                    MRN:                       YOB: 2020  Jennifer Byrd  [id number]  1935     Home health to see patient for:  Skilled Nursing care for assessment, interventions & education, Physical Therapy evaluation and treatment and Occupational therapy evaluation and treatment    Skilled need for:  Comment: recurrent SOB per family, hypertension    Skilled nursing interventions to include:  Comment: check vitals dieudonne blood pressures and O2    Homebound status evidenced by:  Needs the assistance of another person in order to leave the home. Leaving home requires a considerable and taxing effort. There is a normal inability to leave the home.    Community Physician to provide follow up care: Mike Trejo M.D.     Optional Interventions? No      I certify the face to face encounter for this home health care referral meets the CMS requirements and the encounter/clinical assessment with the patient was, in whole, or in part, for the medical condition(s) listed above, which is the primary reason for home health care. Based on my clinical findings: the service(s) are medically necessary, support the need for home health care, and the homebound criteria are met.  I certify that this patient has had a face to face encounter by myself.  Garrett Green M.D. - NPI: [id number]

## 2020-10-06 NOTE — PROGRESS NOTES
Patient's IV discontinued and discharge instructions given. Patient discharged to home with son via wheelchair.

## 2020-10-26 ENCOUNTER — HOSPITAL ENCOUNTER (OUTPATIENT)
Dept: LAB | Facility: MEDICAL CENTER | Age: 85
End: 2020-10-26
Attending: FAMILY MEDICINE
Payer: MEDICARE

## 2020-10-26 LAB
ALBUMIN SERPL BCP-MCNC: 4.3 G/DL (ref 3.2–4.9)
ALBUMIN/GLOB SERPL: 1.7 G/DL
ALP SERPL-CCNC: 72 U/L (ref 30–99)
ALT SERPL-CCNC: 23 U/L (ref 2–50)
ANION GAP SERPL CALC-SCNC: 15 MMOL/L (ref 7–16)
AST SERPL-CCNC: 36 U/L (ref 12–45)
BASOPHILS # BLD AUTO: 0.5 % (ref 0–1.8)
BASOPHILS # BLD: 0.04 K/UL (ref 0–0.12)
BILIRUB SERPL-MCNC: 0.5 MG/DL (ref 0.1–1.5)
BUN SERPL-MCNC: 15 MG/DL (ref 8–22)
CALCIUM SERPL-MCNC: 9.7 MG/DL (ref 8.5–10.5)
CHLORIDE SERPL-SCNC: 94 MMOL/L (ref 96–112)
CHOLEST SERPL-MCNC: 199 MG/DL (ref 100–199)
CO2 SERPL-SCNC: 23 MMOL/L (ref 20–33)
CREAT SERPL-MCNC: 1.64 MG/DL (ref 0.5–1.4)
CREAT UR-MCNC: 133.31 MG/DL
EOSINOPHIL # BLD AUTO: 0.04 K/UL (ref 0–0.51)
EOSINOPHIL NFR BLD: 0.5 % (ref 0–6.9)
ERYTHROCYTE [DISTWIDTH] IN BLOOD BY AUTOMATED COUNT: 51.5 FL (ref 35.9–50)
EST. AVERAGE GLUCOSE BLD GHB EST-MCNC: 105 MG/DL
FASTING STATUS PATIENT QL REPORTED: NORMAL
GLOBULIN SER CALC-MCNC: 2.5 G/DL (ref 1.9–3.5)
GLUCOSE SERPL-MCNC: 123 MG/DL (ref 65–99)
HBA1C MFR BLD: 5.3 % (ref 0–5.6)
HCT VFR BLD AUTO: 45.5 % (ref 37–47)
HDLC SERPL-MCNC: 105 MG/DL
HGB BLD-MCNC: 14.6 G/DL (ref 12–16)
IMM GRANULOCYTES # BLD AUTO: 0.02 K/UL (ref 0–0.11)
IMM GRANULOCYTES NFR BLD AUTO: 0.2 % (ref 0–0.9)
LDLC SERPL CALC-MCNC: 72 MG/DL
LYMPHOCYTES # BLD AUTO: 2.09 K/UL (ref 1–4.8)
LYMPHOCYTES NFR BLD: 24.9 % (ref 22–41)
MCH RBC QN AUTO: 32.5 PG (ref 27–33)
MCHC RBC AUTO-ENTMCNC: 32.1 G/DL (ref 33.6–35)
MCV RBC AUTO: 101.3 FL (ref 81.4–97.8)
MICROALBUMIN UR-MCNC: 3 MG/DL
MICROALBUMIN/CREAT UR: 23 MG/G (ref 0–30)
MONOCYTES # BLD AUTO: 1.19 K/UL (ref 0–0.85)
MONOCYTES NFR BLD AUTO: 14.2 % (ref 0–13.4)
NEUTROPHILS # BLD AUTO: 5.02 K/UL (ref 2–7.15)
NEUTROPHILS NFR BLD: 59.7 % (ref 44–72)
NRBC # BLD AUTO: 0 K/UL
NRBC BLD-RTO: 0 /100 WBC
PLATELET # BLD AUTO: 310 K/UL (ref 164–446)
PMV BLD AUTO: 11.1 FL (ref 9–12.9)
POTASSIUM SERPL-SCNC: 4.4 MMOL/L (ref 3.6–5.5)
PROT SERPL-MCNC: 6.8 G/DL (ref 6–8.2)
RBC # BLD AUTO: 4.49 M/UL (ref 4.2–5.4)
SODIUM SERPL-SCNC: 132 MMOL/L (ref 135–145)
TRIGL SERPL-MCNC: 111 MG/DL (ref 0–149)
WBC # BLD AUTO: 8.4 K/UL (ref 4.8–10.8)

## 2020-10-26 PROCEDURE — 82043 UR ALBUMIN QUANTITATIVE: CPT

## 2020-10-26 PROCEDURE — 80061 LIPID PANEL: CPT

## 2020-10-26 PROCEDURE — 36415 COLL VENOUS BLD VENIPUNCTURE: CPT

## 2020-10-26 PROCEDURE — 82570 ASSAY OF URINE CREATININE: CPT

## 2020-10-26 PROCEDURE — 83036 HEMOGLOBIN GLYCOSYLATED A1C: CPT

## 2020-10-26 PROCEDURE — 85025 COMPLETE CBC W/AUTO DIFF WBC: CPT

## 2020-10-26 PROCEDURE — 80053 COMPREHEN METABOLIC PANEL: CPT

## 2021-01-11 DIAGNOSIS — Z23 NEED FOR VACCINATION: ICD-10-CM

## 2021-08-25 ENCOUNTER — PATIENT MESSAGE (OUTPATIENT)
Dept: HEALTH INFORMATION MANAGEMENT | Facility: OTHER | Age: 86
End: 2021-08-25

## 2021-08-27 ENCOUNTER — HOSPITAL ENCOUNTER (OUTPATIENT)
Dept: LAB | Facility: MEDICAL CENTER | Age: 86
End: 2021-08-27
Attending: FAMILY MEDICINE
Payer: MEDICARE

## 2021-08-27 LAB
25(OH)D3 SERPL-MCNC: 88 NG/ML (ref 30–100)
BASOPHILS # BLD AUTO: 0.7 % (ref 0–1.8)
BASOPHILS # BLD: 0.07 K/UL (ref 0–0.12)
EOSINOPHIL # BLD AUTO: 0.08 K/UL (ref 0–0.51)
EOSINOPHIL NFR BLD: 0.8 % (ref 0–6.9)
ERYTHROCYTE [DISTWIDTH] IN BLOOD BY AUTOMATED COUNT: 50.9 FL (ref 35.9–50)
EST. AVERAGE GLUCOSE BLD GHB EST-MCNC: 100 MG/DL
HBA1C MFR BLD: 5.1 % (ref 4–5.6)
HCT VFR BLD AUTO: 41 % (ref 37–47)
HGB BLD-MCNC: 13.3 G/DL (ref 12–16)
IMM GRANULOCYTES # BLD AUTO: 0.05 K/UL (ref 0–0.11)
IMM GRANULOCYTES NFR BLD AUTO: 0.5 % (ref 0–0.9)
LYMPHOCYTES # BLD AUTO: 2.83 K/UL (ref 1–4.8)
LYMPHOCYTES NFR BLD: 27.1 % (ref 22–41)
MCH RBC QN AUTO: 31.8 PG (ref 27–33)
MCHC RBC AUTO-ENTMCNC: 32.4 G/DL (ref 33.6–35)
MCV RBC AUTO: 98.1 FL (ref 81.4–97.8)
MONOCYTES # BLD AUTO: 1.02 K/UL (ref 0–0.85)
MONOCYTES NFR BLD AUTO: 9.8 % (ref 0–13.4)
NEUTROPHILS # BLD AUTO: 6.38 K/UL (ref 2–7.15)
NEUTROPHILS NFR BLD: 61.1 % (ref 44–72)
NRBC # BLD AUTO: 0 K/UL
NRBC BLD-RTO: 0 /100 WBC
PLATELET # BLD AUTO: 314 K/UL (ref 164–446)
PMV BLD AUTO: 11 FL (ref 9–12.9)
RBC # BLD AUTO: 4.18 M/UL (ref 4.2–5.4)
TSH SERPL DL<=0.005 MIU/L-ACNC: 2.83 UIU/ML (ref 0.38–5.33)
VIT B12 SERPL-MCNC: 1153 PG/ML (ref 211–911)
WBC # BLD AUTO: 10.4 K/UL (ref 4.8–10.8)

## 2021-08-27 PROCEDURE — 82306 VITAMIN D 25 HYDROXY: CPT

## 2021-08-27 PROCEDURE — 85025 COMPLETE CBC W/AUTO DIFF WBC: CPT

## 2021-08-27 PROCEDURE — 36415 COLL VENOUS BLD VENIPUNCTURE: CPT

## 2021-08-27 PROCEDURE — 84443 ASSAY THYROID STIM HORMONE: CPT

## 2021-08-27 PROCEDURE — 82607 VITAMIN B-12: CPT

## 2021-08-27 PROCEDURE — 83036 HEMOGLOBIN GLYCOSYLATED A1C: CPT

## 2021-08-30 ENCOUNTER — HOSPITAL ENCOUNTER (OUTPATIENT)
Dept: LAB | Facility: MEDICAL CENTER | Age: 86
End: 2021-08-30
Attending: FAMILY MEDICINE
Payer: MEDICARE

## 2021-08-30 LAB
ALBUMIN SERPL BCP-MCNC: 4.2 G/DL (ref 3.2–4.9)
ALBUMIN/GLOB SERPL: 1.6 G/DL
ALP SERPL-CCNC: 49 U/L (ref 30–99)
ALT SERPL-CCNC: 14 U/L (ref 2–50)
ANION GAP SERPL CALC-SCNC: 13 MMOL/L (ref 7–16)
AST SERPL-CCNC: 27 U/L (ref 12–45)
BILIRUB SERPL-MCNC: 0.4 MG/DL (ref 0.1–1.5)
BUN SERPL-MCNC: 31 MG/DL (ref 8–22)
CALCIUM SERPL-MCNC: 9.8 MG/DL (ref 8.5–10.5)
CHLORIDE SERPL-SCNC: 99 MMOL/L (ref 96–112)
CHOLEST SERPL-MCNC: 212 MG/DL (ref 100–199)
CO2 SERPL-SCNC: 22 MMOL/L (ref 20–33)
CREAT SERPL-MCNC: 1.08 MG/DL (ref 0.5–1.4)
CREAT UR-MCNC: 53.2 MG/DL
FASTING STATUS PATIENT QL REPORTED: NORMAL
GLOBULIN SER CALC-MCNC: 2.6 G/DL (ref 1.9–3.5)
GLUCOSE SERPL-MCNC: 122 MG/DL (ref 65–99)
HDLC SERPL-MCNC: 68 MG/DL
LDLC SERPL CALC-MCNC: 110 MG/DL
MICROALBUMIN UR-MCNC: <1.2 MG/DL
MICROALBUMIN/CREAT UR: NORMAL MG/G (ref 0–30)
POTASSIUM SERPL-SCNC: 4.4 MMOL/L (ref 3.6–5.5)
PROT SERPL-MCNC: 6.8 G/DL (ref 6–8.2)
SODIUM SERPL-SCNC: 134 MMOL/L (ref 135–145)
TRIGL SERPL-MCNC: 170 MG/DL (ref 0–149)

## 2021-08-30 PROCEDURE — 82043 UR ALBUMIN QUANTITATIVE: CPT

## 2021-08-30 PROCEDURE — 80061 LIPID PANEL: CPT

## 2021-08-30 PROCEDURE — 36415 COLL VENOUS BLD VENIPUNCTURE: CPT

## 2021-08-30 PROCEDURE — 80053 COMPREHEN METABOLIC PANEL: CPT

## 2021-08-30 PROCEDURE — 82570 ASSAY OF URINE CREATININE: CPT

## 2021-11-08 ENCOUNTER — HOSPITAL ENCOUNTER (OUTPATIENT)
Dept: LAB | Facility: MEDICAL CENTER | Age: 86
End: 2021-11-08
Attending: FAMILY MEDICINE
Payer: MEDICARE

## 2021-11-08 PROCEDURE — 86480 TB TEST CELL IMMUN MEASURE: CPT

## 2021-11-08 PROCEDURE — 36415 COLL VENOUS BLD VENIPUNCTURE: CPT

## 2021-11-10 LAB
GAMMA INTERFERON BACKGROUND BLD IA-ACNC: 0.02 IU/ML
M TB IFN-G BLD-IMP: NEGATIVE
M TB IFN-G CD4+ BCKGRND COR BLD-ACNC: 0 IU/ML
MITOGEN IGNF BCKGRD COR BLD-ACNC: >10 IU/ML
QFT TB2 - NIL TBQ2: 0 IU/ML

## 2021-12-08 ENCOUNTER — TELEPHONE (OUTPATIENT)
Dept: HEALTH INFORMATION MANAGEMENT | Facility: OTHER | Age: 86
End: 2021-12-08

## 2021-12-08 NOTE — TELEPHONE ENCOUNTER
Comprehensive Health Assessment. Spoke to son Rich, he stated his mother is in a nursing facility at this time. HIPAA verified.    Attempt#2

## 2022-05-30 ENCOUNTER — HOSPITAL ENCOUNTER (OUTPATIENT)
Facility: MEDICAL CENTER | Age: 87
End: 2022-05-30
Attending: FAMILY MEDICINE
Payer: MEDICARE

## 2022-05-30 LAB
ALBUMIN SERPL BCP-MCNC: 4.4 G/DL (ref 3.2–4.9)
ALBUMIN/GLOB SERPL: 2.1 G/DL
ALP SERPL-CCNC: 63 U/L (ref 30–99)
ALT SERPL-CCNC: 13 U/L (ref 2–50)
ANION GAP SERPL CALC-SCNC: 15 MMOL/L (ref 7–16)
AST SERPL-CCNC: 21 U/L (ref 12–45)
BASOPHILS # BLD AUTO: 0.7 % (ref 0–1.8)
BASOPHILS # BLD: 0.07 K/UL (ref 0–0.12)
BILIRUB SERPL-MCNC: 0.5 MG/DL (ref 0.1–1.5)
BUN SERPL-MCNC: 18 MG/DL (ref 8–22)
CALCIUM SERPL-MCNC: 9.8 MG/DL (ref 8.5–10.5)
CHLORIDE SERPL-SCNC: 100 MMOL/L (ref 96–112)
CHOLEST SERPL-MCNC: 173 MG/DL (ref 100–199)
CO2 SERPL-SCNC: 23 MMOL/L (ref 20–33)
CREAT SERPL-MCNC: 1.47 MG/DL (ref 0.5–1.4)
EOSINOPHIL # BLD AUTO: 0.05 K/UL (ref 0–0.51)
EOSINOPHIL NFR BLD: 0.5 % (ref 0–6.9)
ERYTHROCYTE [DISTWIDTH] IN BLOOD BY AUTOMATED COUNT: 47.8 FL (ref 35.9–50)
EST. AVERAGE GLUCOSE BLD GHB EST-MCNC: 114 MG/DL
GFR SERPLBLD CREATININE-BSD FMLA CKD-EPI: 34 ML/MIN/1.73 M 2
GLOBULIN SER CALC-MCNC: 2.1 G/DL (ref 1.9–3.5)
GLUCOSE SERPL-MCNC: 85 MG/DL (ref 65–99)
HBA1C MFR BLD: 5.6 % (ref 4–5.6)
HCT VFR BLD AUTO: 43.1 % (ref 37–47)
HDLC SERPL-MCNC: 85 MG/DL
HGB BLD-MCNC: 14.3 G/DL (ref 12–16)
IMM GRANULOCYTES # BLD AUTO: 0.02 K/UL (ref 0–0.11)
IMM GRANULOCYTES NFR BLD AUTO: 0.2 % (ref 0–0.9)
LDLC SERPL CALC-MCNC: 65 MG/DL
LYMPHOCYTES # BLD AUTO: 3.32 K/UL (ref 1–4.8)
LYMPHOCYTES NFR BLD: 33.9 % (ref 22–41)
MCH RBC QN AUTO: 30.4 PG (ref 27–33)
MCHC RBC AUTO-ENTMCNC: 33.2 G/DL (ref 33.6–35)
MCV RBC AUTO: 91.7 FL (ref 81.4–97.8)
MONOCYTES # BLD AUTO: 0.94 K/UL (ref 0–0.85)
MONOCYTES NFR BLD AUTO: 9.6 % (ref 0–13.4)
NEUTROPHILS # BLD AUTO: 5.39 K/UL (ref 2–7.15)
NEUTROPHILS NFR BLD: 55.1 % (ref 44–72)
NRBC # BLD AUTO: 0 K/UL
NRBC BLD-RTO: 0 /100 WBC
PLATELET # BLD AUTO: 326 K/UL (ref 164–446)
PMV BLD AUTO: 10.8 FL (ref 9–12.9)
POTASSIUM SERPL-SCNC: 4.8 MMOL/L (ref 3.6–5.5)
PROT SERPL-MCNC: 6.5 G/DL (ref 6–8.2)
RBC # BLD AUTO: 4.7 M/UL (ref 4.2–5.4)
SODIUM SERPL-SCNC: 138 MMOL/L (ref 135–145)
TRIGL SERPL-MCNC: 116 MG/DL (ref 0–149)
TSH SERPL DL<=0.005 MIU/L-ACNC: 2.93 UIU/ML (ref 0.38–5.33)
WBC # BLD AUTO: 9.8 K/UL (ref 4.8–10.8)

## 2022-05-30 PROCEDURE — 84443 ASSAY THYROID STIM HORMONE: CPT

## 2022-05-30 PROCEDURE — 83036 HEMOGLOBIN GLYCOSYLATED A1C: CPT

## 2022-05-30 PROCEDURE — 80053 COMPREHEN METABOLIC PANEL: CPT

## 2022-05-30 PROCEDURE — 85025 COMPLETE CBC W/AUTO DIFF WBC: CPT

## 2022-05-30 PROCEDURE — 80061 LIPID PANEL: CPT

## 2022-08-08 ENCOUNTER — TELEPHONE (OUTPATIENT)
Dept: HEALTH INFORMATION MANAGEMENT | Facility: OTHER | Age: 87
End: 2022-08-08

## 2022-08-26 PROBLEM — E78.49 OTHER HYPERLIPIDEMIA: Status: ACTIVE | Noted: 2022-08-26

## 2022-08-26 PROBLEM — R94.30 NONSPECIFIC ABNORMAL FUNCTION STUDY, CARDIOVASCULAR: Status: ACTIVE | Noted: 2022-08-26

## 2023-07-20 ENCOUNTER — HOSPITAL ENCOUNTER (OUTPATIENT)
Dept: LAB | Facility: MEDICAL CENTER | Age: 88
End: 2023-07-20
Attending: FAMILY MEDICINE
Payer: MEDICARE

## 2023-07-20 LAB
25(OH)D3 SERPL-MCNC: 48 NG/ML (ref 30–100)
ALBUMIN SERPL BCP-MCNC: 4.2 G/DL (ref 3.2–4.9)
ALBUMIN/GLOB SERPL: 1.6 G/DL
ALP SERPL-CCNC: 46 U/L (ref 30–99)
ALT SERPL-CCNC: 16 U/L (ref 2–50)
ANION GAP SERPL CALC-SCNC: 12 MMOL/L (ref 7–16)
AST SERPL-CCNC: 20 U/L (ref 12–45)
BASOPHILS # BLD AUTO: 0.5 % (ref 0–1.8)
BASOPHILS # BLD: 0.04 K/UL (ref 0–0.12)
BILIRUB SERPL-MCNC: 0.5 MG/DL (ref 0.1–1.5)
BUN SERPL-MCNC: 23 MG/DL (ref 8–22)
CALCIUM ALBUM COR SERPL-MCNC: 9.4 MG/DL (ref 8.5–10.5)
CALCIUM SERPL-MCNC: 9.6 MG/DL (ref 8.5–10.5)
CHLORIDE SERPL-SCNC: 103 MMOL/L (ref 96–112)
CHOLEST SERPL-MCNC: 153 MG/DL (ref 100–199)
CO2 SERPL-SCNC: 23 MMOL/L (ref 20–33)
CREAT SERPL-MCNC: 1.29 MG/DL (ref 0.5–1.4)
EOSINOPHIL # BLD AUTO: 0.08 K/UL (ref 0–0.51)
EOSINOPHIL NFR BLD: 1.1 % (ref 0–6.9)
ERYTHROCYTE [DISTWIDTH] IN BLOOD BY AUTOMATED COUNT: 47.8 FL (ref 35.9–50)
FASTING STATUS PATIENT QL REPORTED: NORMAL
GFR SERPLBLD CREATININE-BSD FMLA CKD-EPI: 40 ML/MIN/1.73 M 2
GLOBULIN SER CALC-MCNC: 2.7 G/DL (ref 1.9–3.5)
GLUCOSE SERPL-MCNC: 94 MG/DL (ref 65–99)
HCT VFR BLD AUTO: 42.2 % (ref 37–47)
HDLC SERPL-MCNC: 65 MG/DL
HGB BLD-MCNC: 13.3 G/DL (ref 12–16)
IMM GRANULOCYTES # BLD AUTO: 0.02 K/UL (ref 0–0.11)
IMM GRANULOCYTES NFR BLD AUTO: 0.3 % (ref 0–0.9)
LDLC SERPL CALC-MCNC: 69 MG/DL
LYMPHOCYTES # BLD AUTO: 2.74 K/UL (ref 1–4.8)
LYMPHOCYTES NFR BLD: 36.4 % (ref 22–41)
MCH RBC QN AUTO: 29.6 PG (ref 27–33)
MCHC RBC AUTO-ENTMCNC: 31.5 G/DL (ref 32.2–35.5)
MCV RBC AUTO: 94 FL (ref 81.4–97.8)
MONOCYTES # BLD AUTO: 0.85 K/UL (ref 0–0.85)
MONOCYTES NFR BLD AUTO: 11.3 % (ref 0–13.4)
NEUTROPHILS # BLD AUTO: 3.79 K/UL (ref 1.82–7.42)
NEUTROPHILS NFR BLD: 50.4 % (ref 44–72)
NRBC # BLD AUTO: 0 K/UL
NRBC BLD-RTO: 0 /100 WBC (ref 0–0.2)
PLATELET # BLD AUTO: 227 K/UL (ref 164–446)
PMV BLD AUTO: 11.4 FL (ref 9–12.9)
POTASSIUM SERPL-SCNC: 4.2 MMOL/L (ref 3.6–5.5)
PROT SERPL-MCNC: 6.9 G/DL (ref 6–8.2)
RBC # BLD AUTO: 4.49 M/UL (ref 4.2–5.4)
SODIUM SERPL-SCNC: 138 MMOL/L (ref 135–145)
T4 SERPL-MCNC: 7.1 UG/DL (ref 4–12)
TRIGL SERPL-MCNC: 95 MG/DL (ref 0–149)
TSH SERPL DL<=0.005 MIU/L-ACNC: 4.51 UIU/ML (ref 0.38–5.33)
WBC # BLD AUTO: 7.5 K/UL (ref 4.8–10.8)

## 2023-07-20 PROCEDURE — 85025 COMPLETE CBC W/AUTO DIFF WBC: CPT

## 2023-07-20 PROCEDURE — 84443 ASSAY THYROID STIM HORMONE: CPT

## 2023-07-20 PROCEDURE — 82306 VITAMIN D 25 HYDROXY: CPT

## 2023-07-20 PROCEDURE — 84436 ASSAY OF TOTAL THYROXINE: CPT

## 2023-07-20 PROCEDURE — 80061 LIPID PANEL: CPT

## 2023-07-20 PROCEDURE — 36415 COLL VENOUS BLD VENIPUNCTURE: CPT

## 2023-07-20 PROCEDURE — 80053 COMPREHEN METABOLIC PANEL: CPT

## 2023-09-11 ENCOUNTER — TELEPHONE (OUTPATIENT)
Dept: HEALTH INFORMATION MANAGEMENT | Facility: OTHER | Age: 88
End: 2023-09-11

## 2024-09-11 ENCOUNTER — HOSPITAL ENCOUNTER (EMERGENCY)
Facility: HOSPITAL | Age: 89
Discharge: HOME | End: 2024-09-11
Attending: EMERGENCY MEDICINE
Payer: MEDICARE

## 2024-09-11 ENCOUNTER — APPOINTMENT (OUTPATIENT)
Dept: CARDIOLOGY | Facility: HOSPITAL | Age: 89
End: 2024-09-11
Payer: MEDICARE

## 2024-09-11 VITALS
DIASTOLIC BLOOD PRESSURE: 75 MMHG | RESPIRATION RATE: 18 BRPM | TEMPERATURE: 97.4 F | HEART RATE: 80 BPM | HEIGHT: 65 IN | BODY MASS INDEX: 23.25 KG/M2 | WEIGHT: 139.55 LBS | OXYGEN SATURATION: 97 % | SYSTOLIC BLOOD PRESSURE: 131 MMHG

## 2024-09-11 DIAGNOSIS — N30.00 ACUTE CYSTITIS WITHOUT HEMATURIA: Primary | ICD-10-CM

## 2024-09-11 DIAGNOSIS — I95.9 TRANSIENT HYPOTENSION: ICD-10-CM

## 2024-09-11 LAB
ALBUMIN SERPL BCP-MCNC: 3.9 G/DL (ref 3.4–5)
ALP SERPL-CCNC: 41 U/L (ref 33–136)
ALT SERPL W P-5'-P-CCNC: 10 U/L (ref 7–45)
ANION GAP SERPL CALC-SCNC: 9 MMOL/L (ref 10–20)
APPEARANCE UR: ABNORMAL
AST SERPL W P-5'-P-CCNC: 16 U/L (ref 9–39)
BACTERIA #/AREA URNS AUTO: ABNORMAL /HPF
BASOPHILS # BLD AUTO: 0.03 X10*3/UL (ref 0–0.1)
BASOPHILS NFR BLD AUTO: 0.4 %
BILIRUB SERPL-MCNC: 0.3 MG/DL (ref 0–1.2)
BILIRUB UR STRIP.AUTO-MCNC: NEGATIVE MG/DL
BUN SERPL-MCNC: 26 MG/DL (ref 6–23)
CALCIUM SERPL-MCNC: 8.7 MG/DL (ref 8.6–10.3)
CARDIAC TROPONIN I PNL SERPL HS: 5 NG/L (ref 0–13)
CHLORIDE SERPL-SCNC: 106 MMOL/L (ref 98–107)
CO2 SERPL-SCNC: 28 MMOL/L (ref 21–32)
COLOR UR: YELLOW
CREAT SERPL-MCNC: 1.24 MG/DL (ref 0.5–1.05)
EGFRCR SERPLBLD CKD-EPI 2021: 42 ML/MIN/1.73M*2
EOSINOPHIL # BLD AUTO: 0.05 X10*3/UL (ref 0–0.4)
EOSINOPHIL NFR BLD AUTO: 0.6 %
ERYTHROCYTE [DISTWIDTH] IN BLOOD BY AUTOMATED COUNT: 13.6 % (ref 11.5–14.5)
GLUCOSE BLD MANUAL STRIP-MCNC: 141 MG/DL (ref 74–99)
GLUCOSE SERPL-MCNC: 128 MG/DL (ref 74–99)
GLUCOSE UR STRIP.AUTO-MCNC: NEGATIVE MG/DL
HCT VFR BLD AUTO: 38.6 % (ref 36–46)
HGB BLD-MCNC: 12.8 G/DL (ref 12–16)
HOLD SPECIMEN: NORMAL
IMM GRANULOCYTES # BLD AUTO: 0.02 X10*3/UL (ref 0–0.5)
IMM GRANULOCYTES NFR BLD AUTO: 0.2 % (ref 0–0.9)
KETONES UR STRIP.AUTO-MCNC: NEGATIVE MG/DL
LEUKOCYTE ESTERASE UR QL STRIP.AUTO: ABNORMAL
LYMPHOCYTES # BLD AUTO: 2.38 X10*3/UL (ref 0.8–3)
LYMPHOCYTES NFR BLD AUTO: 28.4 %
MAGNESIUM SERPL-MCNC: 2.04 MG/DL (ref 1.6–2.4)
MCH RBC QN AUTO: 29.6 PG (ref 26–34)
MCHC RBC AUTO-ENTMCNC: 33.2 G/DL (ref 32–36)
MCV RBC AUTO: 89 FL (ref 80–100)
MONOCYTES # BLD AUTO: 1.01 X10*3/UL (ref 0.05–0.8)
MONOCYTES NFR BLD AUTO: 12.1 %
NEUTROPHILS # BLD AUTO: 4.89 X10*3/UL (ref 1.6–5.5)
NEUTROPHILS NFR BLD AUTO: 58.3 %
NITRITE UR QL STRIP.AUTO: POSITIVE
NRBC BLD-RTO: 0 /100 WBCS (ref 0–0)
PH UR STRIP.AUTO: 5 [PH]
PLATELET # BLD AUTO: 305 X10*3/UL (ref 150–450)
POTASSIUM SERPL-SCNC: 4.2 MMOL/L (ref 3.5–5.3)
PROT SERPL-MCNC: 6 G/DL (ref 6.4–8.2)
PROT UR STRIP.AUTO-MCNC: NEGATIVE MG/DL
RBC # BLD AUTO: 4.33 X10*6/UL (ref 4–5.2)
RBC # UR STRIP.AUTO: NEGATIVE /UL
RBC #/AREA URNS AUTO: ABNORMAL /HPF
SODIUM SERPL-SCNC: 139 MMOL/L (ref 136–145)
SP GR UR STRIP.AUTO: 1.01
UROBILINOGEN UR STRIP.AUTO-MCNC: <2 MG/DL
WBC # BLD AUTO: 8.4 X10*3/UL (ref 4.4–11.3)
WBC #/AREA URNS AUTO: >50 /HPF

## 2024-09-11 PROCEDURE — 2500000004 HC RX 250 GENERAL PHARMACY W/ HCPCS (ALT 636 FOR OP/ED): Performed by: EMERGENCY MEDICINE

## 2024-09-11 PROCEDURE — 82947 ASSAY GLUCOSE BLOOD QUANT: CPT

## 2024-09-11 PROCEDURE — 99284 EMERGENCY DEPT VISIT MOD MDM: CPT | Mod: 25

## 2024-09-11 PROCEDURE — 80053 COMPREHEN METABOLIC PANEL: CPT | Performed by: EMERGENCY MEDICINE

## 2024-09-11 PROCEDURE — 85025 COMPLETE CBC W/AUTO DIFF WBC: CPT | Performed by: EMERGENCY MEDICINE

## 2024-09-11 PROCEDURE — 84484 ASSAY OF TROPONIN QUANT: CPT | Performed by: EMERGENCY MEDICINE

## 2024-09-11 PROCEDURE — 87086 URINE CULTURE/COLONY COUNT: CPT | Mod: CONLAB | Performed by: EMERGENCY MEDICINE

## 2024-09-11 PROCEDURE — 96365 THER/PROPH/DIAG IV INF INIT: CPT

## 2024-09-11 PROCEDURE — 36415 COLL VENOUS BLD VENIPUNCTURE: CPT | Performed by: EMERGENCY MEDICINE

## 2024-09-11 PROCEDURE — 81001 URINALYSIS AUTO W/SCOPE: CPT | Performed by: EMERGENCY MEDICINE

## 2024-09-11 PROCEDURE — 93005 ELECTROCARDIOGRAM TRACING: CPT

## 2024-09-11 PROCEDURE — 83735 ASSAY OF MAGNESIUM: CPT | Performed by: EMERGENCY MEDICINE

## 2024-09-11 PROCEDURE — 96361 HYDRATE IV INFUSION ADD-ON: CPT

## 2024-09-11 RX ORDER — CEPHALEXIN 500 MG/1
500 CAPSULE ORAL 3 TIMES DAILY
Qty: 40 CAPSULE | Refills: 0 | Status: SHIPPED | OUTPATIENT
Start: 2024-09-11

## 2024-09-11 RX ORDER — BISMUTH SUBSALICYLATE 262 MG
1 TABLET,CHEWABLE ORAL DAILY
COMMUNITY

## 2024-09-11 RX ORDER — AMLODIPINE BESYLATE 10 MG/1
10 TABLET ORAL DAILY
COMMUNITY

## 2024-09-11 RX ORDER — LANOLIN ALCOHOL/MO/W.PET/CERES
1000 CREAM (GRAM) TOPICAL DAILY
COMMUNITY

## 2024-09-11 RX ORDER — TRAZODONE HYDROCHLORIDE 50 MG/1
50 TABLET ORAL NIGHTLY
COMMUNITY

## 2024-09-11 RX ORDER — RALOXIFENE HYDROCHLORIDE 60 MG/1
60 TABLET, FILM COATED ORAL DAILY
COMMUNITY

## 2024-09-11 RX ORDER — FERROUS SULFATE 325(65) MG
65 TABLET, DELAYED RELEASE (ENTERIC COATED) ORAL
COMMUNITY

## 2024-09-11 RX ORDER — LOSARTAN POTASSIUM 50 MG/1
50 TABLET ORAL DAILY
COMMUNITY

## 2024-09-11 RX ORDER — CEFTRIAXONE 1 G/50ML
1 INJECTION, SOLUTION INTRAVENOUS ONCE
Status: COMPLETED | OUTPATIENT
Start: 2024-09-11 | End: 2024-09-11

## 2024-09-11 ASSESSMENT — COLUMBIA-SUICIDE SEVERITY RATING SCALE - C-SSRS
2. HAVE YOU ACTUALLY HAD ANY THOUGHTS OF KILLING YOURSELF?: NO
6. HAVE YOU EVER DONE ANYTHING, STARTED TO DO ANYTHING, OR PREPARED TO DO ANYTHING TO END YOUR LIFE?: NO
1. IN THE PAST MONTH, HAVE YOU WISHED YOU WERE DEAD OR WISHED YOU COULD GO TO SLEEP AND NOT WAKE UP?: NO

## 2024-09-11 ASSESSMENT — PAIN SCALES - GENERAL
PAINLEVEL_OUTOF10: 0 - NO PAIN
PAINLEVEL_OUTOF10: 0 - NO PAIN

## 2024-09-11 ASSESSMENT — PAIN - FUNCTIONAL ASSESSMENT: PAIN_FUNCTIONAL_ASSESSMENT: 0-10

## 2024-09-11 NOTE — ED PROVIDER NOTES
HPI   Chief Complaint   Patient presents with    Hypotension        chief complaint paramedics brought the patient to the ED because [facility] had a blood pressure of 68/33.  Upon the paramedic arrival the patient's blood pressure had improved and almost normalized spontaneously.    History of present illness this patient is somewhat confused does have a history of chronic dementia    but she is able to provide quite a bit of history she knows her name she knows where she is located she is states she is mad because she does not want to be here at the emergency department she states she feels perfectly fine has no symptoms.  The patient's blood pressure in the emergency department is normal and she denies headache no dizziness no chest pain no shortness of breath no weakness or numbness to face arms or legs patient denies any abdominal pain nausea vomiting or any other symptoms she states she wants to go back to the nursing home but will allow us to perform a evaluation here in the emergency department this patient is on amlodipine as well as losartan for blood pressure.  [place] [facility]  She has no complaints patient does have a history of dementia chronic kidney disease anxiety disorder and hypertension.    She has no complaints     physical exam:    General: Vitals noted, no distress. Afebrile. Alert and oriented  x 3.  Pupils equal and reactive bilaterallyNo sign of head trauma the patient is oriented to person place and not so sure of the year and not 100% sure of the situation that led to her coming to the emergency department.  Oh beautiful thank you okay    EENT: TMs clear. Posterior oropharynx unremarkable. No meningismus. No LAD.     Cardiac: Regular, rate, rhythm, no murmurs rubs or gallops.     Pulmonary: Lungs clear bilaterally with good aeration. No adventitious breath sounds. No wheezes rales or rhonchi.     Abdomen: Soft, nonsurgical. Nontender. No  peritoneal signs. Normoactive bowel sounds. No pulsatile masses.     Extremities: No peripheral edema. Negative Homans bilaterally, no cords.    Skin: No rash. Intact.     Neuro: No focal neurologic deficits, NIH score of 0. Cranial nerves normal as tested from II through XII.                   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 No pertinent surgical history.  No family history on file.  Social History     Tobacco Use    Smoking status: Never     Passive exposure: Never    Smokeless tobacco: Never   Substance Use Topics    Alcohol use: Never    Drug use: Never       Physical Exam   ED Triage Vitals   Temperature Heart Rate Respirations BP   09/11/24 1017 09/11/24 1017 09/11/24 1017 09/11/24 1017   36.3 °C (97.4 °F) 83 18 (!) 124/94      SpO2 Temp Source Heart Rate Source Patient Position   09/11/24 1024 09/11/24 1017 -- --   99 % Temporal        BP Location FiO2 (%)     -- --             Physical Exam      ED Course & MDM   ED Course as of 09/11/24 1211   Wed Sep 11, 2024   1030  EKG interpreted by me sinus rhythm with short NE nonspecific ST-T wave changes no sign of STEMI axis normal [AG]   1143 Urinalysis with Reflex Culture and Microscopic(!)   urinalysis reveals UTI with positive nitrites and 50 white cells per high-powered field [AG]   1144 CBC and Auto Differential(!)   CBC is normal [AG]   1144 Troponin I, High Sensitivity   troponin is 5 normal [AG]   1144 Comprehensive metabolic panel(!)   chemistries reveal glucose 128 creatinine 1.24 and creatinine BUN 26 [AG]   1144  this patient appears to have a UTI boot but  I do not see any evidence of sepsis.  This patient has normal blood pressures, heart rates are in the 70 and 80 range she is in the respiratory rate 18.  I do not believe this patient has sepsis but she certainly will be treated for urinary tract infection. [AG]   1152  the patient states she feels fine and wants to go home she is still angry about being in the emergency department.  She is doing well clinically with normal blood pressures normal heart rates there is no sign of sepsis in this patient she does have a urinary tract infection we will treat with Rocephin and then follow-up with Keflex prescription she will be discharged back to the nursing home [AG]      ED Course User Index  [AG] Fredi Rivas MD         Diagnoses as of 09/11/24 1211   Acute cystitis without hematuria   Transient hypotension                 No data recorded     Brent Coma Scale Score: 15 (09/11/24 1026 : Martina Gaffney RN)                           Medical Decision Making      Procedure  Procedures     Fredi Rivas MD  09/11/24 1210       Fredi Rivas MD  09/11/24 1211       Fredi Rivas MD  09/11/24 1211       Fredi Rivas MD  09/11/24 1211

## 2024-09-11 NOTE — DISCHARGE INSTRUCTIONS
Encourage oral fluids.  Check patient's vital signs twice daily.  Or more often if needed.  Antibiotics will be prescribed for urinary tract infection.  Notify her nursing home physician of the urinary tract infection and the transient hypotension.  Her blood pressure in the emergency department has been normal and stable and there is no sign of sepsis the heart rates have been in the 70 and 80 range and the respiratory rate 18.  Return to the ER if any symptoms change or worsen

## 2024-09-14 LAB — BACTERIA UR CULT: ABNORMAL

## 2024-09-15 LAB
ATRIAL RATE: 84 BPM
P AXIS: 49 DEGREES
P OFFSET: 216 MS
P ONSET: 176 MS
PR INTERVAL: 100 MS
Q ONSET: 226 MS
QRS COUNT: 13 BEATS
QRS DURATION: 76 MS
QT INTERVAL: 380 MS
QTC CALCULATION(BAZETT): 449 MS
QTC FREDERICIA: 424 MS
R AXIS: 38 DEGREES
T AXIS: -50 DEGREES
T OFFSET: 416 MS
VENTRICULAR RATE: 84 BPM

## 2024-09-16 ENCOUNTER — TELEPHONE (OUTPATIENT)
Dept: PHARMACY | Facility: HOSPITAL | Age: 89
End: 2024-09-16
Payer: MEDICARE

## 2024-09-16 NOTE — PROGRESS NOTES
EDPD Note: Lab/Chart Reviewed    Reviewed Mr./Mrs./Ms. Agatha Saucedo 's chart regarding a positive urine culture/result that was taken during their recent emergency room visit. The patient was transferred back to their rehab/LTC facility .Therefore, I have faxed this information to [place] at the [facility] at fax number [phone] .    Susceptibility data from last 90 days.  Collected Specimen Info Organism Amoxicillin/Clavulanate Ampicillin Ampicillin/Sulbactam Cefazolin Cefazolin (uncomplicated UTIs only) Ciprofloxacin Gentamicin Nitrofurantoin Piperacillin/Tazobactam Trimethoprim/Sulfamethoxazole   09/11/24 Urine from Clean Catch/Voided Escherichia coli  S  R  S  S  S  S  S  S  S  S       No further follow up needed from EDPD Team.     Mey Pinto, PharmD

## 2024-12-18 ENCOUNTER — APPOINTMENT (OUTPATIENT)
Dept: RADIOLOGY | Facility: HOSPITAL | Age: 89
End: 2024-12-18
Payer: MEDICARE

## 2024-12-18 ENCOUNTER — HOSPITAL ENCOUNTER (INPATIENT)
Facility: HOSPITAL | Age: 89
Discharge: HOME | DRG: 308 | End: 2024-12-18
Attending: EMERGENCY MEDICINE | Admitting: INTERNAL MEDICINE
Payer: MEDICARE

## 2024-12-18 ENCOUNTER — APPOINTMENT (OUTPATIENT)
Dept: CARDIOLOGY | Facility: HOSPITAL | Age: 89
End: 2024-12-18
Payer: MEDICARE

## 2024-12-18 DIAGNOSIS — I48.91 ATRIAL FIBRILLATION, NEW ONSET (MULTI): Primary | ICD-10-CM

## 2024-12-18 DIAGNOSIS — I10 BENIGN ESSENTIAL HTN: ICD-10-CM

## 2024-12-18 DIAGNOSIS — I48.0 PAROXYSMAL ATRIAL FIBRILLATION (MULTI): ICD-10-CM

## 2024-12-18 DIAGNOSIS — N18.9 ACUTE KIDNEY INJURY SUPERIMPOSED ON CHRONIC KIDNEY DISEASE (CMS-HCC): ICD-10-CM

## 2024-12-18 DIAGNOSIS — N17.9 ACUTE KIDNEY INJURY SUPERIMPOSED ON CHRONIC KIDNEY DISEASE (CMS-HCC): ICD-10-CM

## 2024-12-18 DIAGNOSIS — I63.9 ISCHEMIC STROKE DIAGNOSED DURING CURRENT ADMISSION (MULTI): ICD-10-CM

## 2024-12-18 DIAGNOSIS — I95.9 HYPOTENSION, UNSPECIFIED HYPOTENSION TYPE: ICD-10-CM

## 2024-12-18 PROBLEM — M81.0 AGE-RELATED OSTEOPOROSIS WITHOUT CURRENT PATHOLOGICAL FRACTURE: Status: ACTIVE | Noted: 2024-12-18

## 2024-12-18 PROBLEM — G47.00 INSOMNIA: Status: ACTIVE | Noted: 2024-12-18

## 2024-12-18 LAB
ALBUMIN SERPL BCP-MCNC: 3.7 G/DL (ref 3.4–5)
ALP SERPL-CCNC: 43 U/L (ref 33–136)
ALT SERPL W P-5'-P-CCNC: 9 U/L (ref 7–45)
ANION GAP SERPL CALC-SCNC: 13 MMOL/L (ref 10–20)
APPEARANCE UR: ABNORMAL
AST SERPL W P-5'-P-CCNC: 18 U/L (ref 9–39)
ATRIAL RATE: 108 BPM
ATRIAL RATE: 326 BPM
BACTERIA #/AREA URNS AUTO: ABNORMAL /HPF
BASOPHILS # BLD AUTO: 0.04 X10*3/UL (ref 0–0.1)
BASOPHILS NFR BLD AUTO: 0.3 %
BILIRUB SERPL-MCNC: 0.3 MG/DL (ref 0–1.2)
BILIRUB UR STRIP.AUTO-MCNC: NEGATIVE MG/DL
BNP SERPL-MCNC: 561 PG/ML (ref 0–99)
BUN SERPL-MCNC: 24 MG/DL (ref 6–23)
CALCIUM SERPL-MCNC: 9.2 MG/DL (ref 8.6–10.3)
CARDIAC TROPONIN I PNL SERPL HS: 8 NG/L (ref 0–13)
CARDIAC TROPONIN I PNL SERPL HS: 8 NG/L (ref 0–13)
CHLORIDE SERPL-SCNC: 102 MMOL/L (ref 98–107)
CO2 SERPL-SCNC: 27 MMOL/L (ref 21–32)
COLOR UR: YELLOW
CREAT SERPL-MCNC: 1.62 MG/DL (ref 0.5–1.05)
EGFRCR SERPLBLD CKD-EPI 2021: 30 ML/MIN/1.73M*2
EOSINOPHIL # BLD AUTO: 0.05 X10*3/UL (ref 0–0.4)
EOSINOPHIL NFR BLD AUTO: 0.4 %
ERYTHROCYTE [DISTWIDTH] IN BLOOD BY AUTOMATED COUNT: 13.4 % (ref 11.5–14.5)
GLUCOSE SERPL-MCNC: 172 MG/DL (ref 74–99)
GLUCOSE UR STRIP.AUTO-MCNC: NEGATIVE MG/DL
HCT VFR BLD AUTO: 42.6 % (ref 36–46)
HGB BLD-MCNC: 14 G/DL (ref 12–16)
HOLD SPECIMEN: NORMAL
HYALINE CASTS #/AREA URNS AUTO: ABNORMAL /LPF
IMM GRANULOCYTES # BLD AUTO: 0.06 X10*3/UL (ref 0–0.5)
IMM GRANULOCYTES NFR BLD AUTO: 0.5 % (ref 0–0.9)
KETONES UR STRIP.AUTO-MCNC: NEGATIVE MG/DL
LEUKOCYTE ESTERASE UR QL STRIP.AUTO: ABNORMAL
LYMPHOCYTES # BLD AUTO: 3.33 X10*3/UL (ref 0.8–3)
LYMPHOCYTES NFR BLD AUTO: 26.9 %
MAGNESIUM SERPL-MCNC: 2.2 MG/DL (ref 1.6–2.4)
MCH RBC QN AUTO: 29.9 PG (ref 26–34)
MCHC RBC AUTO-ENTMCNC: 32.9 G/DL (ref 32–36)
MCV RBC AUTO: 91 FL (ref 80–100)
MONOCYTES # BLD AUTO: 1.61 X10*3/UL (ref 0.05–0.8)
MONOCYTES NFR BLD AUTO: 13 %
MUCOUS THREADS #/AREA URNS AUTO: ABNORMAL /LPF
NEUTROPHILS # BLD AUTO: 7.29 X10*3/UL (ref 1.6–5.5)
NEUTROPHILS NFR BLD AUTO: 58.9 %
NITRITE UR QL STRIP.AUTO: NEGATIVE
NRBC BLD-RTO: 0 /100 WBCS (ref 0–0)
PH UR STRIP.AUTO: 5 [PH]
PLATELET # BLD AUTO: 364 X10*3/UL (ref 150–450)
POTASSIUM SERPL-SCNC: 4.6 MMOL/L (ref 3.5–5.3)
PROT SERPL-MCNC: 6.5 G/DL (ref 6.4–8.2)
PROT UR STRIP.AUTO-MCNC: NEGATIVE MG/DL
Q ONSET: 226 MS
Q ONSET: 229 MS
QRS COUNT: 15 BEATS
QRS COUNT: 19 BEATS
QRS DURATION: 70 MS
QRS DURATION: 76 MS
QT INTERVAL: 318 MS
QT INTERVAL: 362 MS
QTC CALCULATION(BAZETT): 430 MS
QTC CALCULATION(BAZETT): 447 MS
QTC FREDERICIA: 389 MS
QTC FREDERICIA: 417 MS
R AXIS: 36 DEGREES
R AXIS: 42 DEGREES
RBC # BLD AUTO: 4.69 X10*6/UL (ref 4–5.2)
RBC # UR STRIP.AUTO: NEGATIVE /UL
RBC #/AREA URNS AUTO: ABNORMAL /HPF
SODIUM SERPL-SCNC: 137 MMOL/L (ref 136–145)
SP GR UR STRIP.AUTO: 1.01
T AXIS: -40 DEGREES
T AXIS: 21 DEGREES
T OFFSET: 388 MS
T OFFSET: 407 MS
T4 FREE SERPL-MCNC: 0.86 NG/DL (ref 0.61–1.12)
TSH SERPL-ACNC: 4.59 MIU/L (ref 0.44–3.98)
UROBILINOGEN UR STRIP.AUTO-MCNC: <2 MG/DL
VENTRICULAR RATE: 110 BPM
VENTRICULAR RATE: 92 BPM
WBC # BLD AUTO: 12.4 X10*3/UL (ref 4.4–11.3)
WBC #/AREA URNS AUTO: ABNORMAL /HPF

## 2024-12-18 PROCEDURE — 83880 ASSAY OF NATRIURETIC PEPTIDE: CPT | Performed by: EMERGENCY MEDICINE

## 2024-12-18 PROCEDURE — 2500000001 HC RX 250 WO HCPCS SELF ADMINISTERED DRUGS (ALT 637 FOR MEDICARE OP): Performed by: EMERGENCY MEDICINE

## 2024-12-18 PROCEDURE — 83735 ASSAY OF MAGNESIUM: CPT | Performed by: EMERGENCY MEDICINE

## 2024-12-18 PROCEDURE — 96361 HYDRATE IV INFUSION ADD-ON: CPT

## 2024-12-18 PROCEDURE — 81001 URINALYSIS AUTO W/SCOPE: CPT | Performed by: EMERGENCY MEDICINE

## 2024-12-18 PROCEDURE — 71045 X-RAY EXAM CHEST 1 VIEW: CPT

## 2024-12-18 PROCEDURE — 84439 ASSAY OF FREE THYROXINE: CPT | Performed by: EMERGENCY MEDICINE

## 2024-12-18 PROCEDURE — 93005 ELECTROCARDIOGRAM TRACING: CPT

## 2024-12-18 PROCEDURE — 84443 ASSAY THYROID STIM HORMONE: CPT | Performed by: EMERGENCY MEDICINE

## 2024-12-18 PROCEDURE — 93005 ELECTROCARDIOGRAM TRACING: CPT | Mod: 76

## 2024-12-18 PROCEDURE — 94760 N-INVAS EAR/PLS OXIMETRY 1: CPT

## 2024-12-18 PROCEDURE — 36415 COLL VENOUS BLD VENIPUNCTURE: CPT | Performed by: EMERGENCY MEDICINE

## 2024-12-18 PROCEDURE — 96374 THER/PROPH/DIAG INJ IV PUSH: CPT | Mod: 59

## 2024-12-18 PROCEDURE — 80053 COMPREHEN METABOLIC PANEL: CPT | Performed by: EMERGENCY MEDICINE

## 2024-12-18 PROCEDURE — 99291 CRITICAL CARE FIRST HOUR: CPT | Mod: 25 | Performed by: EMERGENCY MEDICINE

## 2024-12-18 PROCEDURE — G0378 HOSPITAL OBSERVATION PER HR: HCPCS

## 2024-12-18 PROCEDURE — 84484 ASSAY OF TROPONIN QUANT: CPT | Performed by: EMERGENCY MEDICINE

## 2024-12-18 PROCEDURE — 99222 1ST HOSP IP/OBS MODERATE 55: CPT | Performed by: NURSE PRACTITIONER

## 2024-12-18 PROCEDURE — 2500000004 HC RX 250 GENERAL PHARMACY W/ HCPCS (ALT 636 FOR OP/ED): Performed by: EMERGENCY MEDICINE

## 2024-12-18 PROCEDURE — 71045 X-RAY EXAM CHEST 1 VIEW: CPT | Performed by: RADIOLOGY

## 2024-12-18 PROCEDURE — 2500000001 HC RX 250 WO HCPCS SELF ADMINISTERED DRUGS (ALT 637 FOR MEDICARE OP): Performed by: NURSE PRACTITIONER

## 2024-12-18 PROCEDURE — 87086 URINE CULTURE/COLONY COUNT: CPT | Mod: CONLAB | Performed by: EMERGENCY MEDICINE

## 2024-12-18 PROCEDURE — 85025 COMPLETE CBC W/AUTO DIFF WBC: CPT | Performed by: EMERGENCY MEDICINE

## 2024-12-18 RX ORDER — METOPROLOL TARTRATE 50 MG/1
25 TABLET ORAL ONCE
Status: COMPLETED | OUTPATIENT
Start: 2024-12-18 | End: 2024-12-18

## 2024-12-18 RX ORDER — TRAZODONE HYDROCHLORIDE 50 MG/1
50 TABLET ORAL NIGHTLY
Status: DISCONTINUED | OUTPATIENT
Start: 2024-12-18 | End: 2024-12-27 | Stop reason: HOSPADM

## 2024-12-18 RX ORDER — PANTOPRAZOLE SODIUM 40 MG/1
40 TABLET, DELAYED RELEASE ORAL
Status: DISCONTINUED | OUTPATIENT
Start: 2024-12-19 | End: 2024-12-27 | Stop reason: HOSPADM

## 2024-12-18 RX ORDER — RALOXIFENE HYDROCHLORIDE 60 MG/1
60 TABLET, FILM COATED ORAL DAILY
Status: DISCONTINUED | OUTPATIENT
Start: 2024-12-18 | End: 2024-12-27 | Stop reason: HOSPADM

## 2024-12-18 RX ORDER — LOSARTAN POTASSIUM 50 MG/1
50 TABLET ORAL DAILY
Status: DISCONTINUED | OUTPATIENT
Start: 2024-12-18 | End: 2024-12-20

## 2024-12-18 RX ORDER — ACETAMINOPHEN 325 MG/1
650 TABLET ORAL EVERY 4 HOURS PRN
Status: DISCONTINUED | OUTPATIENT
Start: 2024-12-18 | End: 2024-12-27 | Stop reason: HOSPADM

## 2024-12-18 RX ORDER — METOPROLOL TARTRATE 25 MG/1
25 TABLET, FILM COATED ORAL 2 TIMES DAILY
Status: DISCONTINUED | OUTPATIENT
Start: 2024-12-18 | End: 2024-12-27 | Stop reason: HOSPADM

## 2024-12-18 RX ORDER — METOPROLOL TARTRATE 1 MG/ML
5 INJECTION, SOLUTION INTRAVENOUS
Status: DISPENSED | OUTPATIENT
Start: 2024-12-18 | End: 2024-12-18

## 2024-12-18 RX ORDER — ONDANSETRON HYDROCHLORIDE 2 MG/ML
4 INJECTION, SOLUTION INTRAVENOUS EVERY 8 HOURS PRN
Status: DISCONTINUED | OUTPATIENT
Start: 2024-12-18 | End: 2024-12-27 | Stop reason: HOSPADM

## 2024-12-18 RX ORDER — POLYETHYLENE GLYCOL 3350 17 G/17G
17 POWDER, FOR SOLUTION ORAL DAILY PRN
Status: DISCONTINUED | OUTPATIENT
Start: 2024-12-18 | End: 2024-12-27 | Stop reason: HOSPADM

## 2024-12-18 RX ORDER — METOPROLOL TARTRATE 1 MG/ML
5 INJECTION, SOLUTION INTRAVENOUS EVERY 6 HOURS PRN
Status: DISCONTINUED | OUTPATIENT
Start: 2024-12-18 | End: 2024-12-27 | Stop reason: HOSPADM

## 2024-12-18 RX ORDER — LANOLIN ALCOHOL/MO/W.PET/CERES
1000 CREAM (GRAM) TOPICAL DAILY
Status: DISCONTINUED | OUTPATIENT
Start: 2024-12-18 | End: 2024-12-18

## 2024-12-18 RX ORDER — AMLODIPINE BESYLATE 5 MG/1
10 TABLET ORAL DAILY
Status: DISCONTINUED | OUTPATIENT
Start: 2024-12-18 | End: 2024-12-20

## 2024-12-18 RX ORDER — FERROUS SULFATE 325(65) MG
65 TABLET ORAL DAILY
Status: DISCONTINUED | OUTPATIENT
Start: 2024-12-18 | End: 2024-12-18

## 2024-12-18 RX ADMIN — SODIUM CHLORIDE 1000 ML: 9 INJECTION, SOLUTION INTRAVENOUS at 13:23

## 2024-12-18 RX ADMIN — TRAZODONE HYDROCHLORIDE 50 MG: 50 TABLET ORAL at 20:04

## 2024-12-18 RX ADMIN — METOPROLOL TARTRATE 25 MG: 50 TABLET, FILM COATED ORAL at 14:34

## 2024-12-18 RX ADMIN — APIXABAN 2.5 MG: 2.5 TABLET, FILM COATED ORAL at 20:03

## 2024-12-18 RX ADMIN — METOPROLOL TARTRATE 5 MG: 5 INJECTION INTRAVENOUS at 13:23

## 2024-12-18 SDOH — SOCIAL STABILITY: SOCIAL INSECURITY: WITHIN THE LAST YEAR, HAVE YOU BEEN HUMILIATED OR EMOTIONALLY ABUSED IN OTHER WAYS BY YOUR PARTNER OR EX-PARTNER?: NO

## 2024-12-18 SDOH — ECONOMIC STABILITY: HOUSING INSECURITY: AT ANY TIME IN THE PAST 12 MONTHS, WERE YOU HOMELESS OR LIVING IN A SHELTER (INCLUDING NOW)?: NO

## 2024-12-18 SDOH — SOCIAL STABILITY: SOCIAL INSECURITY
WITHIN THE LAST YEAR, HAVE YOU BEEN KICKED, HIT, SLAPPED, OR OTHERWISE PHYSICALLY HURT BY YOUR PARTNER OR EX-PARTNER?: NO

## 2024-12-18 SDOH — SOCIAL STABILITY: SOCIAL INSECURITY: ARE YOU OR HAVE YOU BEEN THREATENED OR ABUSED PHYSICALLY, EMOTIONALLY, OR SEXUALLY BY ANYONE?: NO

## 2024-12-18 SDOH — ECONOMIC STABILITY: HOUSING INSECURITY: IN THE PAST 12 MONTHS, HOW MANY TIMES HAVE YOU MOVED WHERE YOU WERE LIVING?: 1

## 2024-12-18 SDOH — SOCIAL STABILITY: SOCIAL INSECURITY: WITHIN THE LAST YEAR, HAVE YOU BEEN AFRAID OF YOUR PARTNER OR EX-PARTNER?: NO

## 2024-12-18 SDOH — ECONOMIC STABILITY: FOOD INSECURITY: WITHIN THE PAST 12 MONTHS, YOU WORRIED THAT YOUR FOOD WOULD RUN OUT BEFORE YOU GOT THE MONEY TO BUY MORE.: NEVER TRUE

## 2024-12-18 SDOH — SOCIAL STABILITY: SOCIAL INSECURITY: WERE YOU ABLE TO COMPLETE ALL THE BEHAVIORAL HEALTH SCREENINGS?: YES

## 2024-12-18 SDOH — ECONOMIC STABILITY: FOOD INSECURITY: HOW HARD IS IT FOR YOU TO PAY FOR THE VERY BASICS LIKE FOOD, HOUSING, MEDICAL CARE, AND HEATING?: NOT HARD AT ALL

## 2024-12-18 SDOH — ECONOMIC STABILITY: INCOME INSECURITY: IN THE PAST 12 MONTHS HAS THE ELECTRIC, GAS, OIL, OR WATER COMPANY THREATENED TO SHUT OFF SERVICES IN YOUR HOME?: NO

## 2024-12-18 SDOH — ECONOMIC STABILITY: HOUSING INSECURITY: IN THE LAST 12 MONTHS, WAS THERE A TIME WHEN YOU WERE NOT ABLE TO PAY THE MORTGAGE OR RENT ON TIME?: NO

## 2024-12-18 SDOH — ECONOMIC STABILITY: FOOD INSECURITY: WITHIN THE PAST 12 MONTHS, THE FOOD YOU BOUGHT JUST DIDN'T LAST AND YOU DIDN'T HAVE MONEY TO GET MORE.: NEVER TRUE

## 2024-12-18 SDOH — SOCIAL STABILITY: SOCIAL INSECURITY
WITHIN THE LAST YEAR, HAVE YOU BEEN RAPED OR FORCED TO HAVE ANY KIND OF SEXUAL ACTIVITY BY YOUR PARTNER OR EX-PARTNER?: NO

## 2024-12-18 SDOH — SOCIAL STABILITY: SOCIAL INSECURITY: DO YOU FEEL UNSAFE GOING BACK TO THE PLACE WHERE YOU ARE LIVING?: NO

## 2024-12-18 SDOH — SOCIAL STABILITY: SOCIAL INSECURITY: HAVE YOU HAD THOUGHTS OF HARMING ANYONE ELSE?: NO

## 2024-12-18 SDOH — SOCIAL STABILITY: SOCIAL INSECURITY: ARE THERE ANY APPARENT SIGNS OF INJURIES/BEHAVIORS THAT COULD BE RELATED TO ABUSE/NEGLECT?: NO

## 2024-12-18 SDOH — SOCIAL STABILITY: SOCIAL INSECURITY: DOES ANYONE TRY TO KEEP YOU FROM HAVING/CONTACTING OTHER FRIENDS OR DOING THINGS OUTSIDE YOUR HOME?: NO

## 2024-12-18 SDOH — SOCIAL STABILITY: SOCIAL INSECURITY: DO YOU FEEL ANYONE HAS EXPLOITED OR TAKEN ADVANTAGE OF YOU FINANCIALLY OR OF YOUR PERSONAL PROPERTY?: NO

## 2024-12-18 SDOH — SOCIAL STABILITY: SOCIAL INSECURITY: HAS ANYONE EVER THREATENED TO HURT YOUR FAMILY OR YOUR PETS?: NO

## 2024-12-18 SDOH — SOCIAL STABILITY: SOCIAL INSECURITY: ABUSE: ADULT

## 2024-12-18 SDOH — ECONOMIC STABILITY: TRANSPORTATION INSECURITY: IN THE PAST 12 MONTHS, HAS LACK OF TRANSPORTATION KEPT YOU FROM MEDICAL APPOINTMENTS OR FROM GETTING MEDICATIONS?: NO

## 2024-12-18 ASSESSMENT — ENCOUNTER SYMPTOMS
NEUROLOGICAL NEGATIVE: 1
PSYCHIATRIC NEGATIVE: 1
ENDOCRINE NEGATIVE: 1
MUSCULOSKELETAL NEGATIVE: 1
CONSTITUTIONAL NEGATIVE: 1
CARDIOVASCULAR NEGATIVE: 1
GASTROINTESTINAL NEGATIVE: 1
EYES NEGATIVE: 1
COUGH: 1

## 2024-12-18 ASSESSMENT — COLUMBIA-SUICIDE SEVERITY RATING SCALE - C-SSRS
6. HAVE YOU EVER DONE ANYTHING, STARTED TO DO ANYTHING, OR PREPARED TO DO ANYTHING TO END YOUR LIFE?: NO
2. HAVE YOU ACTUALLY HAD ANY THOUGHTS OF KILLING YOURSELF?: NO
1. IN THE PAST MONTH, HAVE YOU WISHED YOU WERE DEAD OR WISHED YOU COULD GO TO SLEEP AND NOT WAKE UP?: NO

## 2024-12-18 ASSESSMENT — ACTIVITIES OF DAILY LIVING (ADL)
LACK_OF_TRANSPORTATION: NO
JUDGMENT_ADEQUATE_SAFELY_COMPLETE_DAILY_ACTIVITIES: NO
BATHING: INDEPENDENT
TOILETING: INDEPENDENT
FEEDING YOURSELF: INDEPENDENT
ADEQUATE_TO_COMPLETE_ADL: YES
DRESSING YOURSELF: INDEPENDENT
WALKS IN HOME: INDEPENDENT
HEARING - LEFT EAR: FUNCTIONAL
HEARING - RIGHT EAR: FUNCTIONAL
PATIENT'S MEMORY ADEQUATE TO SAFELY COMPLETE DAILY ACTIVITIES?: NO
GROOMING: INDEPENDENT

## 2024-12-18 ASSESSMENT — PATIENT HEALTH QUESTIONNAIRE - PHQ9
SUM OF ALL RESPONSES TO PHQ9 QUESTIONS 1 & 2: 0
1. LITTLE INTEREST OR PLEASURE IN DOING THINGS: NOT AT ALL
2. FEELING DOWN, DEPRESSED OR HOPELESS: NOT AT ALL

## 2024-12-18 ASSESSMENT — COGNITIVE AND FUNCTIONAL STATUS - GENERAL
DAILY ACTIVITIY SCORE: 24
PATIENT BASELINE BEDBOUND: NO
MOBILITY SCORE: 24

## 2024-12-18 ASSESSMENT — LIFESTYLE VARIABLES
AUDIT-C TOTAL SCORE: 4
SKIP TO QUESTIONS 9-10: 1
HOW OFTEN DO YOU HAVE 6 OR MORE DRINKS ON ONE OCCASION: NEVER
HOW OFTEN DO YOU HAVE A DRINK CONTAINING ALCOHOL: 4 OR MORE TIMES A WEEK
HOW MANY STANDARD DRINKS CONTAINING ALCOHOL DO YOU HAVE ON A TYPICAL DAY: 1 OR 2
AUDIT-C TOTAL SCORE: 4

## 2024-12-18 ASSESSMENT — PAIN SCALES - GENERAL
PAINLEVEL_OUTOF10: 0 - NO PAIN

## 2024-12-18 ASSESSMENT — PAIN - FUNCTIONAL ASSESSMENT
PAIN_FUNCTIONAL_ASSESSMENT: 0-10
PAIN_FUNCTIONAL_ASSESSMENT: 0-10

## 2024-12-18 NOTE — H&P
History Of Present Illness  Agatha Saucedo is a 89 y.o. female presenting with a complaint of generalized weakness. She is a poor historian; history obtained from the ED note and old charts. She is from assisted living; she was sent to the ED for evaluation due to a low BP at the facility.    In the ED:  Lab: Glu 172; Na 137; K 4.6; BuN 24; Cr 1.62; ; TSH 4.59; Free T4 0.86; WBC 12.4, Hb 14.0 Hcrt 42.6;   Radiology: CXR No active disease in the chest identified. 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 No pertinent surgical history.     Social History  She reports that she has never smoked. She has never been exposed to tobacco smoke. She has never used smokeless tobacco. She reports that she does not drink alcohol and does not use drugs.    Family History  No family history on file.     Allergies  Fosamax [alendronate], Vesicare [solifenacin], and Zestoretic [lisinopril-hydrochlorothiazide]    Review of Systems   Constitutional: Negative.    HENT: Negative.     Eyes: Negative.    Respiratory:  Positive for cough.    Cardiovascular: Negative.    Gastrointestinal: Negative.    Endocrine: Negative.    Genitourinary: Negative.    Musculoskeletal: Negative.    Skin: Negative.    Neurological: Negative.    Psychiatric/Behavioral: Negative.          Physical Exam  Vitals reviewed.   Constitutional:       Appearance: Normal appearance.  "She is normal weight.   HENT:      Head: Normocephalic and atraumatic.      Right Ear: External ear normal.      Left Ear: External ear normal.      Nose: Nose normal.      Mouth/Throat:      Mouth: Mucous membranes are moist.      Pharynx: Oropharynx is clear.   Eyes:      Conjunctiva/sclera: Conjunctivae normal.      Pupils: Pupils are equal, round, and reactive to light.   Cardiovascular:      Rate and Rhythm: Normal rate and regular rhythm.      Pulses: Normal pulses.      Heart sounds: Normal heart sounds.   Pulmonary:      Effort: Pulmonary effort is normal.      Breath sounds: Normal breath sounds.   Abdominal:      General: Bowel sounds are normal.      Palpations: Abdomen is soft.   Musculoskeletal:         General: Normal range of motion.      Cervical back: Normal range of motion and neck supple.   Skin:     General: Skin is warm and dry.   Neurological:      General: No focal deficit present.      Mental Status: She is alert. Mental status is at baseline. She is disoriented.   Psychiatric:         Mood and Affect: Mood normal.         Behavior: Behavior normal.          Last Recorded Vitals  Blood pressure 117/63, pulse 66, temperature 36.4 °C (97.6 °F), resp. rate 14, height 1.676 m (5' 6\"), weight 63.2 kg (139 lb 6.4 oz), SpO2 94%.    Relevant Results    Scheduled medications  amLODIPine, 10 mg, oral, Daily  apixaban, 2.5 mg, oral, q12h  losartan, 50 mg, oral, Daily  metoprolol tartrate, 25 mg, oral, BID  [START ON 12/19/2024] pantoprazole, 40 mg, oral, Daily before breakfast  raloxifene, 60 mg, oral, Daily  traZODone, 50 mg, oral, Nightly      Continuous medications     PRN medications  PRN medications: acetaminophen, metoprolol, ondansetron, polyethylene glycol    Results for orders placed or performed during the hospital encounter of 12/18/24 (from the past 24 hours)   ECG 12 lead   Result Value Ref Range    Ventricular Rate 110 BPM    Atrial Rate 326 BPM    QRS Duration 70 ms    QT Interval 318 ms "    QTC Calculation(Bazett) 430 ms    R Axis 42 degrees    T Axis -40 degrees    QRS Count 19 beats    Q Onset 229 ms    T Offset 388 ms    QTC Fredericia 389 ms   Light Blue Top   Result Value Ref Range    Extra Tube Hold for add-ons.    SST TOP   Result Value Ref Range    Extra Tube Hold for add-ons.    Gray Top   Result Value Ref Range    Extra Tube Hold for add-ons.    CBC and Auto Differential   Result Value Ref Range    WBC 12.4 (H) 4.4 - 11.3 x10*3/uL    nRBC 0.0 0.0 - 0.0 /100 WBCs    RBC 4.69 4.00 - 5.20 x10*6/uL    Hemoglobin 14.0 12.0 - 16.0 g/dL    Hematocrit 42.6 36.0 - 46.0 %    MCV 91 80 - 100 fL    MCH 29.9 26.0 - 34.0 pg    MCHC 32.9 32.0 - 36.0 g/dL    RDW 13.4 11.5 - 14.5 %    Platelets 364 150 - 450 x10*3/uL    Neutrophils % 58.9 40.0 - 80.0 %    Immature Granulocytes %, Automated 0.5 0.0 - 0.9 %    Lymphocytes % 26.9 13.0 - 44.0 %    Monocytes % 13.0 2.0 - 10.0 %    Eosinophils % 0.4 0.0 - 6.0 %    Basophils % 0.3 0.0 - 2.0 %    Neutrophils Absolute 7.29 (H) 1.60 - 5.50 x10*3/uL    Immature Granulocytes Absolute, Automated 0.06 0.00 - 0.50 x10*3/uL    Lymphocytes Absolute 3.33 (H) 0.80 - 3.00 x10*3/uL    Monocytes Absolute 1.61 (H) 0.05 - 0.80 x10*3/uL    Eosinophils Absolute 0.05 0.00 - 0.40 x10*3/uL    Basophils Absolute 0.04 0.00 - 0.10 x10*3/uL   Comprehensive Metabolic Panel   Result Value Ref Range    Glucose 172 (H) 74 - 99 mg/dL    Sodium 137 136 - 145 mmol/L    Potassium 4.6 3.5 - 5.3 mmol/L    Chloride 102 98 - 107 mmol/L    Bicarbonate 27 21 - 32 mmol/L    Anion Gap 13 10 - 20 mmol/L    Urea Nitrogen 24 (H) 6 - 23 mg/dL    Creatinine 1.62 (H) 0.50 - 1.05 mg/dL    eGFR 30 (L) >60 mL/min/1.73m*2    Calcium 9.2 8.6 - 10.3 mg/dL    Albumin 3.7 3.4 - 5.0 g/dL    Alkaline Phosphatase 43 33 - 136 U/L    Total Protein 6.5 6.4 - 8.2 g/dL    AST 18 9 - 39 U/L    Bilirubin, Total 0.3 0.0 - 1.2 mg/dL    ALT 9 7 - 45 U/L   Magnesium   Result Value Ref Range    Magnesium 2.20 1.60 - 2.40 mg/dL    B-Type Natriuretic Peptide   Result Value Ref Range     (H) 0 - 99 pg/mL   Troponin I, High Sensitivity, Initial   Result Value Ref Range    Troponin I, High Sensitivity 8 0 - 13 ng/L   TSH with reflex to Free T4 if abnormal   Result Value Ref Range    Thyroid Stimulating Hormone 4.59 (H) 0.44 - 3.98 mIU/L   Thyroxine, Free   Result Value Ref Range    Thyroxine, Free 0.86 0.61 - 1.12 ng/dL   ECG 12 lead   Result Value Ref Range    Ventricular Rate 92 BPM    Atrial Rate 108 BPM    QRS Duration 76 ms    QT Interval 362 ms    QTC Calculation(Bazett) 447 ms    R Axis 36 degrees    T Axis 21 degrees    QRS Count 15 beats    Q Onset 226 ms    T Offset 407 ms    QTC Fredericia 417 ms   Troponin, High Sensitivity, 1 Hour   Result Value Ref Range    Troponin I, High Sensitivity 8 0 - 13 ng/L             Assessment/Plan   Assessment & Plan  Paroxysmal atrial fibrillation (Multi)    Benign essential HTN    Insomnia    Age-related osteoporosis without current pathological fracture    Acute-on-chronic kidney injury (CMS-HCC)      Paroxysmal atrial fibrillation, new onset  Essential HTN  -   - serial trop 8 > 8  - EKG reviewed, atrial fibrillation with   - given metoprolol tartrate in the ED  - converted to NSR in the ED  - continue metoprolol tartrate 25 mg BID, amlodipine 10 mg daily  - started apixaban 2.5 mg BID  - hold losartan d/t renal function  - cardiac monitoring via telemetry  - cardiology consult  - echocardiogram pending    Acute on chronic Kidney injury, stage 3a  - baseline creatine 1.2  - creatine on admission 1.62  - given 500 mL bolus of IVF in the Ed  - hold losartan  - avoid nephrotoxic medication  - monitor renal function    Osteoporosis  - continue raloxifene 60 mg daily    Insomnia  - continue trazodone 50 mg daily    PUD ppx  - started pantoprazole 40 mg daily    DVT ppx  - started apixaban 2.5 mg BID    Code status: Full    Disposition: Patient requires less than 2 inpatient days.      Clementina Mckeon, APRN-CNP    Attending Attestation:    I was present with the APRN-CNP who participated in the documentation of this note. I have personally seen and re-examined the patient and performed the medical decision-making components (assessment and plan of care). I have reviewed the documentation and verified the findings in the note as written with additions or exceptions as stated in the body of this note.    Patient was sent from nursing home for low BP. She has advanced dementia and no history has been obtained from patient. I reviewed the medical information, spoke to the patient nurse.    ER labs were:  Glu 172; Na 137; K 4.6; BuN 24; Cr 1.62; ; TSH 4.59; Free T4 0.86; WBC 12.4, Hb 14.0 Hcrt 42.6;    She also has afib with RVR with HR of 120 at the time of admission..    Considering her severe dementia, she is not candidate for cardiac intervention. Cardiology consult has been placed. Son is coming from [place] and we need to speak to him about CODE status of the patient.  She also has SILAS on CKD stage III a likely due to dehydration and give her IV fluid. Encouragement for PO intake is not an option because of severity of mental impairment.    Luigi Chacon MD  Internal Medicine

## 2024-12-18 NOTE — ED PROVIDER NOTES
HPI   Chief Complaint   Patient presents with    Weakness, Gen       89-year-old female with dementia CKD hyperlipidemia presents via EMS from the [facility] for evaluation of low blood pressure.  At the facility, blood pressure was 70 systolic.  EMS stated that on arrival, patient had a blood pressure of 90 systolic.  She was noted to be in atrial fibrillation with rate of 120, so she was brought in for evaluation of low blood pressure and new A-fib.  She has advanced dementia.  She is awake and pleasant.  She is unable to provide history.      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 No pertinent surgical history.  No family history on file.  Social History     Tobacco Use    Smoking status: Never     Passive exposure: Never    Smokeless tobacco: Never   Substance Use Topics    Alcohol use: Never    Drug use: Never       Physical Exam   ED Triage Vitals   Temperature Heart Rate Respirations BP   12/18/24 1315 12/18/24 1310 12/18/24 1310 12/18/24 1305   36.4 °C (97.6 °F) (!) 128 16 113/80      SpO2 Temp src Heart Rate Source Patient Position   12/18/24 1305 -- -- --   98 %         BP Location FiO2 (%)     -- --             Physical Exam  Vitals and nursing note reviewed.   Constitutional:       General: She is not in acute distress.     Appearance: She is well-developed.   HENT:      Head: Normocephalic and atraumatic.   Eyes:      Conjunctiva/sclera: Conjunctivae normal.   Cardiovascular:      Rate and Rhythm:  Tachycardia present. Rhythm irregular.      Pulses: Normal pulses.      Heart sounds: No murmur heard.  Pulmonary:      Effort: Pulmonary effort is normal. No respiratory distress.      Breath sounds: Normal breath sounds.   Abdominal:      Palpations: Abdomen is soft.      Tenderness: There is no abdominal tenderness.   Musculoskeletal:         General: No swelling.      Cervical back: Neck supple.   Skin:     General: Skin is warm and dry.      Capillary Refill: Capillary refill takes less than 2 seconds.   Neurological:      General: No focal deficit present.      Mental Status: She is alert. Mental status is at baseline.      Cranial Nerves: No cranial nerve deficit.      Sensory: No sensory deficit.      Motor: No weakness.   Psychiatric:         Mood and Affect: Mood normal.           ED Course & MDM   ED Course as of 12/18/24 1646   Wed Dec 18, 2024   1416 89-year-old female with advanced dementia hypertension hyperlipidemia chronic kidney disease presents with new onset atrial fibrillation.  On arrival, EKG shows A-fib with rate of 110.  She was given metoprolol 5 mg IV with improvement of rate to 92.  Will check laboratory studies and chest x-ray.  Will discuss with cardiology. [BT]   1416 XR chest 1 view  Chest x-ray unremarkable. [BT]   1416 Creatinine(!): 1.62  Creatinine 1.62.  This is increased from only comparison value in September of this year at which time creatinine was 1.24.  Acute kidney injury on chronic kidney disease. [BT]   1417 ECG 12 lead  Initial EKG shows atrial fibrillation with rate of 110.  Normal axis.  Nonspecific ST-T changes.  No acute injury pattern. [BT]   1515 XR chest 1 view  Chest x-ray with no acute process [BT]   1609 Case discussed with cardiology Dr. Obrien.  Given SILAS in setting of new A-fib, he recommended hospital observation for echo IV fluids and initiation of anticoagulation and monitoring of renal function. [BT]   1609 Case discussed with medicine hospitalist Louann  MAITE Mckeon who will hospitalize under observation for SILAS, new A-Fib [BT]   1644 ECG 12 lead  Repeat EKG interpreted by me shows sinus rhythm with sinus arrhythmia.  Rate = 67.  Normal axis.  Normal intervals.  No acute injury pattern.  Sinus rhythm has replaced atrial fibrillation. [BT]      ED Course User Index  [BT] Ej Escamilla DO         Diagnoses as of 12/18/24 1646   Hypotension, unspecified hypotension type   Acute kidney injury superimposed on chronic kidney disease (CMS-HCC)   Atrial fibrillation, new onset (Multi)                 No data recorded     Brent Coma Scale Score: 15 (12/18/24 1339 : Tamica Cunningham RN)                           Medical Decision Making      Procedure  Critical Care    Performed by: Ej Escamilla DO  Authorized by: Ej Escamilla DO    Critical care provider statement:     Critical care time (minutes):  45    Critical care start time:  12/18/2024 3:00 PM    Critical care end time:  12/18/2024 3:45 PM    Critical care was necessary to treat or prevent imminent or life-threatening deterioration of the following conditions: atrial fibrillation with RVR.    Critical care was time spent personally by me on the following activities:  Ordering and review of laboratory studies, ordering and review of radiographic studies, discussions with consultants, re-evaluation of patient's condition, evaluation of patient's response to treatment, pulse oximetry and examination of patient    Care discussed with: admitting provider         Ej Escamilla DO  12/18/24 1610       Ej Escamilla DO  12/18/24 1647

## 2024-12-18 NOTE — CARE PLAN
The patient's goals for the shift include      The clinical goals for the shift include Patient will use call bell for assistance throughout the shift    Patient admitted this shift. Patient needs frequent reorientation to the unit and monitoring devices. Patient has removed telemetry multiple times. Patient removed her IV shortly after arriving to the floor. Patient is currenlty in NSR on tele. Patient stated no pain. Pending urine sample results that was sent to lab. Patient is currently in bed resting, no needs at this time.

## 2024-12-19 ENCOUNTER — APPOINTMENT (OUTPATIENT)
Dept: RADIOLOGY | Facility: HOSPITAL | Age: 89
End: 2024-12-19
Payer: MEDICARE

## 2024-12-19 ENCOUNTER — APPOINTMENT (OUTPATIENT)
Dept: CARDIOLOGY | Facility: HOSPITAL | Age: 89
DRG: 308 | End: 2024-12-19
Payer: MEDICARE

## 2024-12-19 ENCOUNTER — APPOINTMENT (OUTPATIENT)
Dept: CARDIOLOGY | Facility: HOSPITAL | Age: 89
End: 2024-12-19
Payer: MEDICARE

## 2024-12-19 LAB
ALBUMIN SERPL BCP-MCNC: 3.6 G/DL (ref 3.4–5)
ALP SERPL-CCNC: 37 U/L (ref 33–136)
ALT SERPL W P-5'-P-CCNC: 8 U/L (ref 7–45)
ANION GAP SERPL CALC-SCNC: 10 MMOL/L (ref 10–20)
AORTIC VALVE PEAK VELOCITY: 1.06 M/S
APPEARANCE UR: ABNORMAL
AST SERPL W P-5'-P-CCNC: 14 U/L (ref 9–39)
ATRIAL RATE: 67 BPM
ATRIAL RATE: 72 BPM
AV PEAK GRADIENT: 5 MMHG
AVA (PEAK VEL): 1.91 CM2
BACTERIA #/AREA URNS AUTO: ABNORMAL /HPF
BILIRUB SERPL-MCNC: 0.3 MG/DL (ref 0–1.2)
BILIRUB UR STRIP.AUTO-MCNC: NEGATIVE MG/DL
BUN SERPL-MCNC: 25 MG/DL (ref 6–23)
CALCIUM SERPL-MCNC: 8.9 MG/DL (ref 8.6–10.3)
CARDIAC TROPONIN I PNL SERPL HS: 32 NG/L (ref 0–13)
CHLORIDE SERPL-SCNC: 105 MMOL/L (ref 98–107)
CO2 SERPL-SCNC: 26 MMOL/L (ref 21–32)
COLOR UR: YELLOW
CREAT SERPL-MCNC: 1.35 MG/DL (ref 0.5–1.05)
EGFRCR SERPLBLD CKD-EPI 2021: 38 ML/MIN/1.73M*2
EJECTION FRACTION APICAL 4 CHAMBER: 61.1
EJECTION FRACTION: 58 %
ERYTHROCYTE [DISTWIDTH] IN BLOOD BY AUTOMATED COUNT: 13.4 % (ref 11.5–14.5)
GLUCOSE BLD MANUAL STRIP-MCNC: 103 MG/DL (ref 74–99)
GLUCOSE SERPL-MCNC: 106 MG/DL (ref 74–99)
GLUCOSE UR STRIP.AUTO-MCNC: NEGATIVE MG/DL
HCT VFR BLD AUTO: 36.5 % (ref 36–46)
HGB BLD-MCNC: 12 G/DL (ref 12–16)
HOLD SPECIMEN: NORMAL
HOLD SPECIMEN: NORMAL
KETONES UR STRIP.AUTO-MCNC: NEGATIVE MG/DL
LEFT ATRIUM VOLUME AREA LENGTH INDEX BSA: 24.3 ML/M2
LEFT VENTRICLE INTERNAL DIMENSION DIASTOLE: 4.19 CM (ref 3.5–6)
LEFT VENTRICULAR OUTFLOW TRACT DIAMETER: 2.07 CM
LEUKOCYTE ESTERASE UR QL STRIP.AUTO: ABNORMAL
MCH RBC QN AUTO: 29.8 PG (ref 26–34)
MCHC RBC AUTO-ENTMCNC: 32.9 G/DL (ref 32–36)
MCV RBC AUTO: 91 FL (ref 80–100)
MITRAL VALVE E/A RATIO: 1.12
NITRITE UR QL STRIP.AUTO: NEGATIVE
NRBC BLD-RTO: 0 /100 WBCS (ref 0–0)
P AXIS: 39 DEGREES
P AXIS: 72 DEGREES
P OFFSET: 210 MS
P OFFSET: 214 MS
P ONSET: 151 MS
P ONSET: 161 MS
PH UR STRIP.AUTO: 5 [PH]
PLATELET # BLD AUTO: 311 X10*3/UL (ref 150–450)
POTASSIUM SERPL-SCNC: 4.3 MMOL/L (ref 3.5–5.3)
PR INTERVAL: 132 MS
PR INTERVAL: 154 MS
PROT SERPL-MCNC: 6.3 G/DL (ref 6.4–8.2)
PROT UR STRIP.AUTO-MCNC: NEGATIVE MG/DL
Q ONSET: 227 MS
Q ONSET: 228 MS
QRS COUNT: 11 BEATS
QRS COUNT: 12 BEATS
QRS DURATION: 72 MS
QRS DURATION: 80 MS
QT INTERVAL: 406 MS
QT INTERVAL: 412 MS
QTC CALCULATION(BAZETT): 429 MS
QTC CALCULATION(BAZETT): 451 MS
QTC FREDERICIA: 421 MS
QTC FREDERICIA: 438 MS
R AXIS: 21 DEGREES
R AXIS: 30 DEGREES
RBC # BLD AUTO: 4.03 X10*6/UL (ref 4–5.2)
RBC # UR STRIP.AUTO: NEGATIVE /UL
RBC #/AREA URNS AUTO: ABNORMAL /HPF
RIGHT VENTRICLE FREE WALL PEAK S': 11 CM/S
RIGHT VENTRICLE PEAK SYSTOLIC PRESSURE: 57.5 MMHG
SODIUM SERPL-SCNC: 137 MMOL/L (ref 136–145)
SP GR UR STRIP.AUTO: 1.02
SQUAMOUS #/AREA URNS AUTO: ABNORMAL /HPF
T AXIS: 21 DEGREES
T AXIS: 38 DEGREES
T OFFSET: 431 MS
T OFFSET: 433 MS
TRICUSPID ANNULAR PLANE SYSTOLIC EXCURSION: 1.4 CM
UROBILINOGEN UR STRIP.AUTO-MCNC: <2 MG/DL
VENTRICULAR RATE: 67 BPM
VENTRICULAR RATE: 72 BPM
WBC # BLD AUTO: 10.7 X10*3/UL (ref 4.4–11.3)
WBC #/AREA URNS AUTO: ABNORMAL /HPF
WBC CLUMPS #/AREA URNS AUTO: ABNORMAL /HPF

## 2024-12-19 PROCEDURE — 2500000002 HC RX 250 W HCPCS SELF ADMINISTERED DRUGS (ALT 637 FOR MEDICARE OP, ALT 636 FOR OP/ED): Performed by: NURSE PRACTITIONER

## 2024-12-19 PROCEDURE — 2500000004 HC RX 250 GENERAL PHARMACY W/ HCPCS (ALT 636 FOR OP/ED): Performed by: HOSPITALIST

## 2024-12-19 PROCEDURE — 85027 COMPLETE CBC AUTOMATED: CPT | Performed by: EMERGENCY MEDICINE

## 2024-12-19 PROCEDURE — G0378 HOSPITAL OBSERVATION PER HR: HCPCS

## 2024-12-19 PROCEDURE — G0426 INPT/ED TELECONSULT50: HCPCS | Performed by: NURSE PRACTITIONER

## 2024-12-19 PROCEDURE — 80053 COMPREHEN METABOLIC PANEL: CPT | Performed by: EMERGENCY MEDICINE

## 2024-12-19 PROCEDURE — 2500000001 HC RX 250 WO HCPCS SELF ADMINISTERED DRUGS (ALT 637 FOR MEDICARE OP): Performed by: NURSE PRACTITIONER

## 2024-12-19 PROCEDURE — 93005 ELECTROCARDIOGRAM TRACING: CPT

## 2024-12-19 PROCEDURE — 93306 TTE W/DOPPLER COMPLETE: CPT

## 2024-12-19 PROCEDURE — 81001 URINALYSIS AUTO W/SCOPE: CPT | Performed by: EMERGENCY MEDICINE

## 2024-12-19 PROCEDURE — 36415 COLL VENOUS BLD VENIPUNCTURE: CPT | Performed by: EMERGENCY MEDICINE

## 2024-12-19 PROCEDURE — 70450 CT HEAD/BRAIN W/O DYE: CPT

## 2024-12-19 PROCEDURE — 96365 THER/PROPH/DIAG IV INF INIT: CPT | Mod: 59

## 2024-12-19 PROCEDURE — 71045 X-RAY EXAM CHEST 1 VIEW: CPT

## 2024-12-19 PROCEDURE — 84484 ASSAY OF TROPONIN QUANT: CPT | Performed by: EMERGENCY MEDICINE

## 2024-12-19 PROCEDURE — 82947 ASSAY GLUCOSE BLOOD QUANT: CPT | Mod: 59

## 2024-12-19 PROCEDURE — 97162 PT EVAL MOD COMPLEX 30 MIN: CPT | Mod: GP

## 2024-12-19 PROCEDURE — 87086 URINE CULTURE/COLONY COUNT: CPT | Mod: CONLAB | Performed by: EMERGENCY MEDICINE

## 2024-12-19 PROCEDURE — 93306 TTE W/DOPPLER COMPLETE: CPT | Performed by: INTERNAL MEDICINE

## 2024-12-19 PROCEDURE — 71045 X-RAY EXAM CHEST 1 VIEW: CPT | Performed by: RADIOLOGY

## 2024-12-19 PROCEDURE — 99239 HOSP IP/OBS DSCHRG MGMT >30: CPT | Performed by: NURSE PRACTITIONER

## 2024-12-19 PROCEDURE — 70450 CT HEAD/BRAIN W/O DYE: CPT | Performed by: SURGERY

## 2024-12-19 PROCEDURE — 94760 N-INVAS EAR/PLS OXIMETRY 1: CPT

## 2024-12-19 PROCEDURE — 97166 OT EVAL MOD COMPLEX 45 MIN: CPT | Mod: GO

## 2024-12-19 RX ORDER — METOPROLOL TARTRATE 25 MG/1
25 TABLET, FILM COATED ORAL 2 TIMES DAILY
Qty: 60 TABLET | Refills: 0 | Status: SHIPPED | OUTPATIENT
Start: 2024-12-19 | End: 2025-01-18

## 2024-12-19 RX ORDER — QUETIAPINE FUMARATE 25 MG/1
25 TABLET, FILM COATED ORAL NIGHTLY
Status: DISCONTINUED | OUTPATIENT
Start: 2024-12-19 | End: 2024-12-27 | Stop reason: HOSPADM

## 2024-12-19 RX ORDER — CEFTRIAXONE 1 G/50ML
1 INJECTION, SOLUTION INTRAVENOUS ONCE
Status: COMPLETED | OUTPATIENT
Start: 2024-12-19 | End: 2024-12-19

## 2024-12-19 RX ORDER — LORAZEPAM 0.5 MG/1
0.5 TABLET ORAL EVERY 8 HOURS PRN
Status: DISCONTINUED | OUTPATIENT
Start: 2024-12-19 | End: 2024-12-27 | Stop reason: HOSPADM

## 2024-12-19 RX ADMIN — APIXABAN 2.5 MG: 2.5 TABLET, FILM COATED ORAL at 21:51

## 2024-12-19 RX ADMIN — AMLODIPINE BESYLATE 10 MG: 5 TABLET ORAL at 08:51

## 2024-12-19 RX ADMIN — QUETIAPINE FUMARATE 25 MG: 25 TABLET ORAL at 21:51

## 2024-12-19 RX ADMIN — METOPROLOL TARTRATE 25 MG: 25 TABLET, FILM COATED ORAL at 01:51

## 2024-12-19 RX ADMIN — METOPROLOL TARTRATE 25 MG: 25 TABLET, FILM COATED ORAL at 08:51

## 2024-12-19 RX ADMIN — CEFTRIAXONE 1 G: 1 INJECTION, SOLUTION INTRAVENOUS at 04:07

## 2024-12-19 RX ADMIN — LORAZEPAM 0.5 MG: 0.5 TABLET ORAL at 18:15

## 2024-12-19 RX ADMIN — METOPROLOL TARTRATE 25 MG: 25 TABLET, FILM COATED ORAL at 21:51

## 2024-12-19 RX ADMIN — TRAZODONE HYDROCHLORIDE 50 MG: 50 TABLET ORAL at 21:51

## 2024-12-19 RX ADMIN — APIXABAN 2.5 MG: 2.5 TABLET, FILM COATED ORAL at 08:51

## 2024-12-19 RX ADMIN — RALOXIFENE 60 MG: 60 TABLET ORAL at 09:06

## 2024-12-19 ASSESSMENT — PAIN - FUNCTIONAL ASSESSMENT
PAIN_FUNCTIONAL_ASSESSMENT: UNABLE TO SELF-REPORT
PAIN_FUNCTIONAL_ASSESSMENT: UNABLE TO SELF-REPORT

## 2024-12-19 ASSESSMENT — COGNITIVE AND FUNCTIONAL STATUS - GENERAL
MOVING TO AND FROM BED TO CHAIR: A LITTLE
MOBILITY SCORE: 14
STANDING UP FROM CHAIR USING ARMS: A LITTLE
TOILETING: A LOT
STANDING UP FROM CHAIR USING ARMS: A LITTLE
EATING MEALS: A LITTLE
DRESSING REGULAR LOWER BODY CLOTHING: A LITTLE
MOBILITY SCORE: 20
TOILETING: A LITTLE
DRESSING REGULAR LOWER BODY CLOTHING: A LITTLE
TURNING FROM BACK TO SIDE WHILE IN FLAT BAD: A LITTLE
DRESSING REGULAR UPPER BODY CLOTHING: A LITTLE
WALKING IN HOSPITAL ROOM: A LOT
WALKING IN HOSPITAL ROOM: A LITTLE
HELP NEEDED FOR BATHING: A LITTLE
PERSONAL GROOMING: A LITTLE
DAILY ACTIVITIY SCORE: 16
MOVING FROM LYING ON BACK TO SITTING ON SIDE OF FLAT BED WITH BEDRAILS: A LITTLE
PERSONAL GROOMING: A LITTLE
CLIMB 3 TO 5 STEPS WITH RAILING: TOTAL
CLIMB 3 TO 5 STEPS WITH RAILING: A LITTLE
HELP NEEDED FOR BATHING: A LOT
MOVING TO AND FROM BED TO CHAIR: A LOT
DAILY ACTIVITIY SCORE: 19
DRESSING REGULAR UPPER BODY CLOTHING: A LITTLE

## 2024-12-19 ASSESSMENT — PAIN SCALES - PAIN ASSESSMENT IN ADVANCED DEMENTIA (PAINAD)
NEGVOCALIZATION: OCCASIONAL MOAN/GROAN, LOW SPEECH, NEGATIVE/DISAPPROVING QUALITY
BODYLANGUAGE: TENSE, DISTRESSED PACING, FIDGETING
CONSOLABILITY: DISTRACTED OR REASSURED BY VOICE/TOUCH
BREATHING: NORMAL
TOTALSCORE: 4
FACIALEXPRESSION: SAD, FRIGHTENED, FROWN

## 2024-12-19 ASSESSMENT — PAIN SCALES - GENERAL
PAINLEVEL_OUTOF10: 0 - NO PAIN
PAINLEVEL_OUTOF10: 0 - NO PAIN

## 2024-12-19 ASSESSMENT — ACTIVITIES OF DAILY LIVING (ADL)
ADL_ASSISTANCE: INDEPENDENT
BATHING_ASSISTANCE: MODERATE

## 2024-12-19 NOTE — CARE PLAN
Patient remains very confused throughout shift, unable to stand on her own. Leans to the right side or to the front when standing or sitting. Patient tolerated medications this shift. Able to get patient to take drinks of water throughout shift but refusing to eat. Patient assessed by PT/OT, echo done this am. Patient very agitated as the shift progressed ripping off monitors and ripping out IV. Order for ativan PO obtained. No IV access at this time. MRI scheduled for tomorrow.

## 2024-12-19 NOTE — PROGRESS NOTES
Occupational Therapy    Evaluation    Patient Name: Agatha Saucedo  MRN: [id number]  Department: [facility] 3  Room: [address] [place], [place] [address]A  Today's Date: 12/19/2024  Time Calculation  Start Time: 1520  Stop Time: 1534  Time Calculation (min): 14 min    Assessment  IP OT Assessment  OT Assessment: Pt is a 89 y/o female presenting for a skilled OT evaluation following admission to the hospital for deconditioning. Pt displayed decreased endurance and safety with ADLs and transfers. Pt would benefit from OT at the mod vs high intensity to increase safety and return to PLOF. Will continue to assess patient R side for progression of symptomology as well as cognitive needs.  Prognosis: Fair  Barriers to Discharge Home: Caregiver assistance, Cognition needs, Physical needs  Caregiver Assistance: Caregiver assistance needed per identified barriers - however, level of patient's required assistance exceeds assistance available at home  Cognition Needs: 24hr supervision for safety awareness needed, Medication and/or medical management daily assist needed, Recollection or understanding of precautions/restrictions limited, Insight of patient limited regarding functional ability/needs, Cognition-related high falls risk  Physical Needs: 24hr mobility assistance needed, 24hr ADL assistance needed, High falls risk due to function or environment  Evaluation/Treatment Tolerance: Other (Comment), Patient limited by fatigue (pt limited by cognition)  End of Session Communication: Bedside nurse, PCT/NA/CTA  End of Session Patient Position: Bed, 4 rail up, Alarm on    Plan:  Treatment Interventions: ADL retraining, Functional transfer training, Endurance training, Neuromuscular reeducation  OT Frequency: 4 times per week  OT Discharge Recommendations: Moderate intensity level of continued care, High intensity level of continued care, 24 hr supervision due to cognition  Equipment Recommended upon Discharge:  (TBD)  OT Recommended Transfer Status: Stand by  assist, Assist of 2  OT - OK to Discharge: Yes  Based on completed evaluation and care plan recommendations, no barriers to discharge to next site of care.    Subjective   Current Problem:  1. Atrial fibrillation, new onset (Multi)  apixaban (Eliquis) 2.5 mg tablet    metoprolol tartrate (Lopressor) 25 mg tablet    Referral to Primary Care [facility]    Referral to Cardiology      2. Hypotension, unspecified hypotension type        3. Acute kidney injury superimposed on chronic kidney disease (CMS-HCC)        4. Paroxysmal atrial fibrillation (Multi)  Transthoracic Echo (TTE) Complete    Transthoracic Echo (TTE) Complete    apixaban (Eliquis) 2.5 mg tablet    metoprolol tartrate (Lopressor) 25 mg tablet    Referral to Primary Care [facility]    Referral to Cardiology        General:  General  Reason for Referral: ADLs/safety  Referred By: GEMMA Gallegos-CNP  Past Medical History Relevant to Rehab: Pt presenting to the ED with increased weakness and founbd to have new onset of a fib. Now presenting with OT eval for safety and to establish if patient can return to AL. (PMH: dementia, CKD, HLD, anxiety, HTN, osteoporosis, insomnia)  Family/Caregiver Present: Yes  Caregiver Feedback: son arriving at nd of [facility] reporting patient is not at baseline functioning.  Co-Treatment: PT  Co-Treatment Reason: d/t patient complexity and to increase safety  Prior to Session Communication: Bedside nurse  Patient Position Received: Bed, 4 rail up, Alarm on  General Comment: Pt standing with bed alarm going off as therapists entering the room. Pt left with all needs in reach following session.    Precautions:  Medical Precautions: Fall precautions  Precautions Comment: highly impulsive, speech aphasic, unable to follow commands well.    Vital Sign (Past 2hrs)        Date/Time Vitals Session Patient Position Pulse Resp SpO2 BP MAP (mmHg)    12/19/24 1520 --  --  87  16  98 %  146/78  --     12/19/24 1521 --  --  " --  --  --  146/78  --                   Pain:  Pain Assessment  Pain Assessment: Unable to self-report    Objective   Cognition:  Overall Cognitive Status: Impaired at baseline (dx of dementia)  Orientation Level: Unable to assess (pt just repeatredly stating to therapists, \"I am not today, today, today...\")  Safety/Judgement: Exceptions to WFL  Complex Functional Tasks: Maximal  Novel Situations: Maximal  Routine Tasks: Maximal  Insight: Moderate  Impulsive: Severely     Home Living:  Type of Home: Assisted living  Lives With: Other (Comment) (residents)  Home Adaptive Equipment: None  Home Layout: One level  Home Access: Level entry  Bathroom Shower/Tub: Walk-in shower  Bathroom Toilet: Handicapped height  Bathroom Equipment: Grab bars in shower, Built-in shower seat, Raised toilet seat with rails  Home Living Comments[place]     Prior Function:  Level of [place]: Independent with ADLs and functional transfers, Independent with homemaking with ambulation, Other (Comment) (nursing reports from nurse at the villa that cares for patient there)  Receives Help From: Personal care attendant  ADL Assistance: Independent  Homemaking Assistance: Independent  Ambulatory Assistance: Independent (w/o AD)  Prior Function Comments: placed in AL d/t cognition only     ADL:  Eating Assistance: Minimal  Grooming Assistance: Stand by  Bathing Assistance: Moderate  UE Dressing Assistance: Minimal  LE Dressing Assistance: Moderate  LE Dressing Deficit: Setup, Steadying, Requires assistive device for steadying, Verbal cueing, Supervision/safety, Increased time to complete, Pull up over hips, Thread RLE into pants  Toileting Assistance with Device: Moderate    Activity Tolerance:  Endurance: Decreased tolerance for upright activites    Bed Mobility/Transfers:   Bed Mobility  Bed Mobility: Yes  Bed Mobility 1  Bed Mobility 1: Sitting to supine  Level of Assistance 1: Close supervision  Bed Mobility Comments 1: HOB " flat holding onto bed rail with L UE, able to  bring legs into be without difficulty    Transfers  Transfer: Yes  Transfer 1  Transfer From 1: Bed to  Transfer to 1: Stand, Sit  Technique 1: Sit to stand, Stand to sit  Transfer Device 1: Gait belt  Transfer Level of Assistance 1: Contact guard, +2  Trials/Comments 1: 3 trials total with ability to stand without physical assitance though poor safety/hand placement with R lean noted.    Functional Mobility:  Functional Mobility  Functional Mobility Performed: Yes  Functional Mobility 1  Surface 1: Level tile  Device 1:  (attempting to have patient hold onto walker though unable to follow cues; hand in hand assistance requiring with OT and PT at each side of patient)  Functional Mobility Support Devices: Gait belt  Assistance 1: Hand held assistance, Moderate assistance, Contact guard (+2)  Comments 1: attempting to take steps though patientunable to progress forwad. Heavily leaning towards right side wiht mod a from pt to maintain standing position as patient attmepting to turn around to face bed. OT facilitating safe sit onto bed d/t poor awareness for safety from patient.    Sitting Balance:  Static Sitting Balance  Static Sitting-Level of Assistance: Contact guard  Dynamic Sitting Balance  Dynamic Sitting-Level of Assistance: Contact guard    Standing Balance:  Static Standing Balance  Static Standing-Level of Assistance: Contact guard  Dynamic Standing Balance  Dynamic Standing-Level of Assistance: Moderate assistance      Vision:   Vision - Basic Assessment  Patient Visual Report: Other (Comment) (unable to report)    Sensation:  Sensation Comment: unable to assess    Strength:  Strength Comments: unbale to foramlly assess B UE strength though able to participate in transfers with ease pushing with B UE to intiate    Coordination:  Movements are Fluid and Coordinated: No  Upper Body Coordination: unable to formally assess though R UE having increased difficulty  participating in pants donning activity. Pt attempting pull up pants on R side missing when grabbing for them.      Extremities:   WFL: unable to formally assess ROM though able to participate in transfers and reaching overhead throughout session to attempt to take gown off.     Outcome Measures:   [facility] Daily Activity  Putting on and taking off regular lower body clothing: A little  Bathing (including washing, rinsing, drying): A lot  Putting on and taking off regular upper body clothing: A little  Toileting, which includes using toilet, bedpan or urinal: A lot  Taking care of personal grooming such as brushing teeth: A little  Eating Meals: A little  Daily Activity - Total Score: 16      Education Documentation  ADL Training, taught by Erendira Cummings OT at 12/19/2024  4:35 PM.  Learner: Patient  Readiness: Acceptance  Method: Explanation  Response: Needs Reinforcement, No Evidence of Learning  Comment: safety with transfers, use of call bell.    Education Comments  No comments found.      Goals:   Encounter Problems       Encounter Problems (Active)       ADLs       Patient will participate in bathing with supervision/set-up while seated at sink or standing at sink using PRN adaptive equipment.         Start:  12/19/24    Expected End:  01/02/25            Patient will participate in upper and lower body dressing with supervision/set-up while sitting in chair or standing using PRN adaptive equipment.          Start:  12/19/24    Expected End:  01/02/25            Patient will participate in all tolieting activities with supervision/set-up using an elevated toilet seat and grab bars.        Start:  12/19/24    Expected End:  01/02/25               BALANCE       Pt will maintain dynamic standing balance during ADL task with supervision level of assistance in order to demonstrate decreased risk of falling and improved postural control.       Start:  12/19/24    Expected End:  01/02/25                COGNITION/SAFETY       Pt will follow simple commands 100% of the time during OT tx session to increase independence and safety with ADLs.        Start:  12/19/24    Expected End:  01/02/25               TRANSFERS       Patient will participate in bed mobility with supervision/set-up to increase safety with mobility at home.       Start:  12/19/24    Expected End:  01/02/25            Patient will participate in sit to stand transfers with supervision/set-up using LRAD to increase safety with ADLs.        Start:  12/19/24    Expected End:  01/02/25

## 2024-12-19 NOTE — DISCHARGE SUMMARY
Discharge Diagnosis  Paroxysmal atrial fibrillation (Multi)  Acute on Chronic kidney injury, stage 3a    Issues Requiring Follow-Up      Discharge Meds     Medication List      START taking these medications     apixaban 2.5 mg tablet; Commonly known as: Eliquis; Take 2 tablets (5   mg) by mouth 2 times a day.   metoprolol tartrate 25 mg tablet; Commonly known as: Lopressor; Take 1   tablet (25 mg) by mouth 2 times a day.     CONTINUE taking these medications     amLODIPine 10 mg tablet; Commonly known as: Norvasc   multivitamin tablet   raloxifene 60 mg tablet; Commonly known as: Evista   traZODone 50 mg tablet; Commonly known as: Desyrel     STOP taking these medications     cephalexin 500 mg capsule; Commonly known as: Keflex   cyanocobalamin 1,000 mcg tablet; Commonly known as: Vitamin B-12   ferrous sulfate 325 (65 Fe) MG EC tablet   losartan 50 mg tablet; Commonly known as: Cozaar       Test Results Pending At Discharge  Pending Labs       Order Current Status    Extra Urine Gray Tube Collected (12/19/24 0305)    Lavender Top Collected (12/18/24 1317)    PST Top Collected (12/18/24 1317)    [place] draw In process    Urinalysis with Reflex Culture and Microscopic In process    Urine Culture In process    Urine Culture In process         89 y.o. female presenting with a complaint of generalized weakness. She is a poor historian; history obtained from the ED note and old charts. She is from assisted living; she was sent to the ED for evaluation due to a low BP at the facility.     In the ED:  Lab: Glu 172; Na 137; K 4.6; BuN 24; Cr 1.62; ; TSH 4.59; Free T4 0.86; WBC 12.4, Hb 14.0 Hcrt 42.6;   Radiology: CXR No active disease in the chest identified. EKG showed Atrial Fibrillation with   Medications: Metoprolol, 1000 mL NS  Disposition: admit to med/surg tele    Hospital Course    Paroxysmal atrial fibrillation, new onset  Essential HTN  -   - serial trop 8 > 8  - EKG reviewed, atrial  fibrillation with   - given metoprolol tartrate in the ED  - converted to NSR in the ED  - continue metoprolol tartrate 25 mg BID, amlodipine 10 mg daily  - started apixaban 2.5 mg BID  - hold losartan d/t renal function  - cardiac monitoring via telemetry  - cardiology consult  - echocardiogram pending     Acute on chronic Kidney injury, stage 3a  - baseline creatine 1.2  - creatine on admission 1.62  - given 500 mL bolus of IVF in the Ed  - hold losartan  - avoid nephrotoxic medication  - monitor renal function     Osteoporosis  - continue raloxifene 60 mg daily     Insomnia  - continue trazodone 50 mg daily     PUD ppx  - started pantoprazole 40 mg daily     DVT ppx  - started apixaban 2.5 mg BID     Code status: Full     Disposition: Patient was stable to be discharged to home. She was discharged on apixaban and metoprolol tartrate. Losartan was discontinued. She will follow up with her PCP and cardiology.    Total cumulative time spent in preparation of this discharge including documentation review, coordination of care with the medical team including PT/SW/care coordinators and treating consultants, discussion with patient and pertinent family members and finalization of prescriptions, follow-up appointments, and this discharge summary was approximately 45 minutes.     Pertinent Physical Exam At Time of Discharge  Physical Exam  Vitals reviewed.   Constitutional:       Appearance: Normal appearance. She is normal weight.   HENT:      Head: Normocephalic and atraumatic.      Right Ear: External ear normal.      Left Ear: External ear normal.      Nose: Nose normal.      Mouth/Throat:      Mouth: Mucous membranes are moist.      Pharynx: Oropharynx is clear.   Eyes:      Conjunctiva/sclera: Conjunctivae normal.      Pupils: Pupils are equal, round, and reactive to light.   Cardiovascular:      Rate and Rhythm: Normal rate and regular rhythm.      Pulses: Normal pulses.      Heart sounds: Normal heart  sounds.   Pulmonary:      Effort: Pulmonary effort is normal.      Breath sounds: Normal breath sounds.   Abdominal:      General: Bowel sounds are normal.      Palpations: Abdomen is soft.   Musculoskeletal:         General: Normal range of motion.      Cervical back: Normal range of motion and neck supple.   Skin:     General: Skin is warm and dry.   Neurological:      General: No focal deficit present.      Mental Status: She is alert. Mental status is at baseline. She is disoriented.   Psychiatric:         Mood and Affect: Mood normal.         Behavior: Behavior normal.   Outpatient Follow-Up  No future appointments.      Clementina Mckeon, APRN-CNP

## 2024-12-19 NOTE — CONSULTS
"    Cardiology Consult Note  Patient: Agatha Saucedo  Unit/Bed: 301/301-A  YOB: 1935    Admitting Diagnosis: Atrial fibrillation, new onset (Multi) [I48.91]  Hypotension, unspecified hypotension type [I95.9]  Acute kidney injury superimposed on chronic kidney disease (CMS-HCC) [N17.9, N18.9]  Paroxysmal atrial fibrillation (Multi) [I48.0]  Date:  12/18/2024  Hospital Day: 0  Attending: Cardiology consulting at the request of  Luigi Chacon MD  for new onset atrial fibrillation     Chief Complaint   Patient presents with    Weakness, Gen      History of Present Illness:   Agatha Saucedo is a 89 y.o. Female who presented via [facility]  with c/o low blood pressure at the [facility]. BP was 70s systolic. EMS has systolic BP of 90s and found to be in Afib with HR in the 120s. She has advanced dementia and unable to provide history.     Spoke to Son Meño via telephone whom is currently traveling here to the area. He denies any cardiac history for his mother and notes that her dementia is quite advanced, she will hide things and throw things away.     Past medical history significant for  CKD 3, HLD, senile dementia, HTN, Anxiety   Primary Cardiology outpatient: none       SUBJECTIVE:   Sitting up in bed, attempted to talk to patient via video chat and patient stated \"I dont feel like talking today\" Does not appear to be in any acute distress        ALLERGIES:   Allergies   Allergen Reactions    Fosamax [Alendronate] Unknown    Vesicare [Solifenacin] Unknown    Zestoretic [Lisinopril-Hydrochlorothiazide] Unknown      Home Medication:  Medications Prior to Admission   Medication Sig Dispense Refill Last Dose/Taking    amLODIPine (Norvasc) 10 mg tablet Take 1 tablet (10 mg) by mouth once daily.   12/18/2024    cephalexin (Keflex) 500 mg capsule Take 1 capsule (500 mg) by mouth 3 times a day. 40 capsule 0     cyanocobalamin (Vitamin B-12) 1,000 mcg tablet Take 1 tablet (1,000 mcg) by mouth once daily.       " ferrous sulfate 325 (65 Fe) MG EC tablet Take 65 mg by mouth 3 times daily (morning, midday, late afternoon). Do not crush, chew, or split.       losartan (Cozaar) 50 mg tablet Take 1 tablet (50 mg) by mouth once daily.       multivitamin tablet Take 1 tablet by mouth once daily.       raloxifene (Evista) 60 mg tablet Take 1 tablet (60 mg) by mouth once daily.       traZODone (Desyrel) 50 mg tablet Take 1 tablet (50 mg) by mouth once daily at bedtime.         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 No pertinent surgical history.    SOCIAL Hx:  Social History     Socioeconomic History    Marital status:    Tobacco Use    Smoking status: Never     Passive exposure: Never    Smokeless tobacco: Never   Substance and Sexual Activity    Alcohol use: Never    Drug use: Never    Sexual activity: Not Currently     Social Drivers of Health     Financial Resource Strain: Low Risk  (12/18/2024)    Overall Financial Resource Strain (CARDIA)     Difficulty of Paying Living Expenses: Not hard at all   Food Insecurity: No Food Insecurity (12/18/2024)    Hunger Vital Sign     Worried About Running Out of Food in the Last Year: Never true     Ran Out of Food in the Last Year: Never true   Transportation Needs: No Transportation Needs (12/18/2024)    PRAPARE - Transportation     Lack of Transportation (Medical): No     Lack of Transportation (Non-Medical): No   Intimate Partner Violence: Not At Risk (12/18/2024)    Humiliation, Afraid, Rape, and Kick questionnaire     Fear of Current or Ex-Partner:  No     Emotionally Abused: No     Physically Abused: No     Sexually Abused: No   Housing Stability: Low Risk  (12/18/2024)    Housing Stability Vital Sign     Unable to Pay for Housing in the Last Year: No     Number of Times Moved in the Last Year: 1     Homeless in the Last Year: No       FAMILY Hx:  No family history on file.    REVIEW OF SYMPTOMS:   12 system review of symptoms is negative except as noted above          OBJECTIVE:     VITALS:   Visit Vitals  BP (!) 163/92   Pulse 75   Temp 37 °C (98.6 °F) (Temporal)   Resp 18        Wt Readings from Last 5 Encounters:   12/18/24 63.2 kg (139 lb 6.4 oz)   09/11/24 63.3 kg (139 lb 8.8 oz)       INTAKE/ OUTPUT:   I/O last 3 completed shifts:  In: 1290 (20.4 mL/kg) [P.O.:240; IV Piggyback:1050]  Out: - (0 mL/kg)   Weight: 63.2 kg      TELEMETRY MONITORING: SR    PHYSICAL EXAMINATION:    Vitals and nursing note reviewed.   Constitutional:       General: Awake.      Appearance: Normal and healthy appearance. Well-developed and not in distress.   Eyes:      General: Lids are normal.   Pulmonary:      Effort: Pulmonary effort is normal.      Comments: No conversational dyspnea  Musculoskeletal: Normal range of motion.      Cervical back: Normal range of motion and neck supple. Skin:     General: Skin is dry.   Neurological:      General: No focal deficit present.      Mental Status: Alert. Mental status is at baseline. Confused. Exhibits altered mental status.   Psychiatric:         Attention and Perception: Attention normal.         Mood and Affect: Mood normal.         Speech: Speech normal.         Behavior: Behavior normal. Behavior is cooperative.         Thought Content: Thought content normal.         Cognition and Memory: Cognition and memory normal.            LABS:  CMP:   Recent Labs     12/19/24  0255 12/18/24  1318 09/11/24  1059    137 139   K 4.3 4.6 4.2    102 106   CO2 26 27 28   ANIONGAP 10 13 9*   BUN 25* 24* 26*   CREATININE 1.35* 1.62*  "1.24*   EGFR 38* 30* 42*   MG  --  2.20 2.04     LIVER ENZYMES:   Recent Labs     12/19/24  0255 12/18/24  1318 09/11/24  1059   ALBUMIN 3.6 3.7 3.9   ALT 8 9 10   AST 14 18 16   BILITOT 0.3 0.3 0.3     CBC:  Recent Labs     12/19/24  0255 12/18/24  1318 09/11/24  1059   WBC 10.7 12.4* 8.4   HGB 12.0 14.0 12.8   HCT 36.5 42.6 38.6    364 305   MCV 91 91 89     COAG: No results for input(s): \"PTT\", \"INR\", \"ARIXTRA\" in the last 71164 hours.  ABO: No results for input(s): \"ABO\" in the last 00525 hours.  HEME/ENDO:  Recent Labs     12/18/24  1318   TSH 4.59*      CARDIAC:   Recent Labs     12/19/24  0255 12/18/24  1428 12/18/24  1318 09/11/24  1059   TROPHS 32* 8 8 5   BNP  --   --  561*  --        Lipid Panel:  No results found for: \"HDL\", \"CHHDL\", \"VLDL\", \"TRIG\", \"NHDL\"       CURRENT MEDICATIONS:   Infusions:     Scheduled:  amLODIPine, 10 mg, Daily  apixaban, 2.5 mg, q12h  [Held by provider] losartan, 50 mg, Daily  metoprolol tartrate, 25 mg, BID  pantoprazole, 40 mg, Daily before breakfast  raloxifene, 60 mg, Daily  traZODone, 50 mg, Nightly      PRN:  acetaminophen, 650 mg, q4h PRN  metoprolol, 5 mg, q6h PRN  ondansetron, 4 mg, q8h PRN  polyethylene glycol, 17 g, Daily PRN        LAST IMAGING RESULTS INDEPENDENTLY REVIEWED:   ECG 12 lead  Normal sinus rhythm with sinus arrhythmia  Normal ECG  When compared with ECG of 18-DEC-2024 13:45,  Sinus rhythm has replaced Atrial fibrillation  ECG 12 lead  Normal sinus rhythm  Normal ECG  When compared with ECG of 18-DEC-2024 15:15, (unconfirmed)  No significant change was found  XR chest 1 view  Narrative: Interpreted By:  Sebastian Valero,   STUDY:  XR CHEST 1 VIEW; ;  12/19/2024 3:21 am      INDICATION:  Signs/Symptoms:cough lethargy.      COMPARISON:  12/18/2024      ACCESSION NUMBER(S):  [id number]      ORDERING CLINICIAN:  REZA SHEN      TECHNIQUE:  A portable radiograph of the chest is performed.      FINDINGS:  Old fracture/deformity of the distal " left clavicle is again  identified.      The heart is enlarged. The pulmonary vessels are within normal  limits. There is no airspace consolidation, pleural effusion, or  pneumothorax.      Impression: Persistent cardiomegaly. There is otherwise no sign of acute  cardiopulmonary disease. If the patient's symptoms persist, continued  follow-up is recommended.          MACRO:  None      Signed by: Sebastian Valero 12/19/2024 7:26 AM  Dictation workstation:   [id number]  CT head wo IV contrast  Narrative: Interpreted By:  Jaylan Briggs,   STUDY:  CT HEAD WO IV CONTRAST;  12/19/2024 3:21 am      INDICATION:  Signs/Symptoms:change in mental status r/o stroke.          COMPARISON:  None.      ACCESSION NUMBER(S):  [id number]      ORDERING CLINICIAN:  REZA SHEN      TECHNIQUE:  Noncontrast axial CT scan of head was performed. Angled reformats in  brain and bone windows were generated. The images were reviewed in  bone, brain, blood and soft tissue windows.      FINDINGS:  CSF Spaces: The ventricles, sulci and basal cisterns are within  normal limits. There is no extraaxial fluid collection.      Parenchyma: Advanced volume loss.  There is periventricular and  subcortical white matter hypoattenuation, most in keeping with  chronic microvascular ischemic change. The grey-white differentiation  is intact. There is no mass effect or midline shift.  There is no  intracranial hemorrhage.      Calvarium: The calvarium is unremarkable.      Paranasal sinuses and mastoids: Visualized paranasal sinuses and  mastoids are clear.      Impression: No evidence of acute cortical infarct or intracranial hemorrhage.          MACRO:  None      Signed by: Jaylan Briggs 12/19/2024 5:44 AM  Dictation workstation:   [id number]       CARDIOVASCULAR STUDIES:       EKG dated 12/18/2024 independently reviewed   Atrial fibrillation with RVR    12/19/2024 - SR no STT changes    Echocardiogram 12/19/2024--pending       ASSESSMENT:   88 yo F  admitted with  weakness, found with new onset atrial fibrillation with RVR spontaneously converted to NSR  SILAS on CKD - improving    Advanced dementia  HTN  HLD      PLAN:    Echocardiogram to assess LVEF and structural heart - pending  Continue Metoprolol tartrate 25 mg BID  Apixaban 2.5 mg BID initially, recommend to increase to 5 mg BID as she no longer meets 2/3 criteria for reduced dose  Discussed risk vs benefit with Son and while he is not keen on OAC he understands and will continue for now  Discussed alternative options that could be pursued as an OP such as a Watchman device should he be interested    Follow up as an OP with [facility], information in the discharge instructions and encouraged son to call with questions.     Thank you  for the consult   Will follow peripherally  Please call or message with questions, concerns or changes in clinical condition   The case was discussed with NESS Dumont     Virtual or Telephone Consent    An interactive audio and video telecommunication system which permits real time communications between the patient (at the originating site) and provider (at the distant site) was utilized to provide this telehealth service.   Verbal consent was requested and obtained from Agatha Saucedo on this date, 12/19/24 for a telehealth visit.        Electronically signed by NESS Martinez on 12/19/2024 at 10:41 AM  I spent 50 minutes in the professional and overall care of this patient.

## 2024-12-19 NOTE — PROGRESS NOTES
Physical Therapy    Physical Therapy Evaluation    Patient Name: Agatha Saucedo  MRN: [id number]  Department: [facility]  Room: [address] [place] [address]  Today's Date: 12/19/2024   Time Calculation  Start Time: 1521  Stop Time: 1535  Time Calculation (min): 14 min    Assessment/Plan   PT Assessment  PT Assessment Results: Decreased strength, Decreased endurance, Decreased range of motion, Impaired balance, Decreased mobility, Decreased cognition, Impaired judgement, Decreased safety awareness, Decreased coordination  Rehab Prognosis: Fair  Barriers to Discharge Home: Cognition needs, Physical needs  Cognition Needs: 24hr supervision for safety awareness needed  Physical Needs: 24hr mobility assistance needed  Evaluation/Treatment Tolerance: Treatment limited secondary to medical complications (Comment) (unknown reason for decline)  Medical Staff Made Aware: Yes  Strengths: Support of extended family/friends  Barriers to Participation: Comorbidities  End of Session Communication: Bedside nurse, Care Coordinator  Assessment Comment: Pt is a 88 y/o F seen for PT evaluation following admission for afib, but developing R sided weakness during hospitalization and worsening cognitive deficits. Pt is  demonstrating deficits in strength, ROM, endurance, balance, and general mobility. Pt requires skilled PT intervention to address deficits and prevent decline while hospitalized. Recommending mod vs high intensity depending on pt progress while hospitalized and causes of worsening status. Pt will need cont assessment.  End of Session Patient Position: Alarm on, Bed, 4 rail up  IP OR SWING BED PT PLAN  Inpatient or Swing Bed: Inpatient  PT Plan  Treatment/Interventions: Bed mobility, Transfer training, Gait training, Stair training, Balance training, Neuromuscular re-education, Therapeutic exercise, Therapeutic activity, Strengthening, Endurance training, Home exercise program, Positioning, Postural re-education  PT Plan: Ongoing PT  PT Frequency:  4 times per week  PT Discharge Recommendations: Moderate intensity level of continued care, High intensity level of continued care  Equipment Recommended upon Discharge:  (TBD)  PT Recommended Transfer Status: Assist x2  PT - OK to Discharge: Yes Based on completed evaluation and care plan recommendations, no barriers to discharge to next site of care     Subjective   General Visit Information:  General  Reason for Referral: impaired mobility and gait difficulty  Referred By: Clementina Mckeon, APRN-CNP  Past Medical History Relevant to Rehab: PMHx includes dementia, CKD, HLD, anxiety, HTN, osteoporosis  Family/Caregiver Present: Yes  Caregiver Feedback: son arriving at end of session and verifying pt has had a decline from baseline  Co-Treatment: OT  Co-Treatment Reason: d/t pt complexity and assist levels  Prior to Session Communication: Bedside nurse  Patient Position Received: Bed, 4 rail up, Alarm on  General Comment: Pt cleared by nursing, left w call bell in reach. Pt rising from bed w alarm sounding as PT entered room.  Home Living:  Home Living  Type of Home: Assisted living ([facility])  Lives With: Alone  Home Adaptive Equipment: None  Home Layout: One level  Home Access: Level entry  Bathroom Shower/Tub: Walk-in shower  Bathroom Toilet: Handicapped height  Bathroom Equipment: Built-in shower seat, Grab bars in shower, Grab bars around toilet  Home Living Comments: pt lives at [facility]  Prior Level of Function:  Prior Function Per Pt/Caregiver Report  Level of [place]: Independent with ADLs and functional transfers, Independent with homemaking with ambulation (per nursing report after calling the [facility])  Receives Help From: Personal care attendant  Prior Function Comments: Nursing stating pt was indep at baseline w/o any AD. Was in assisted living d/t cognition.  Precautions:  Precautions  Medical Precautions: Fall precautions  Precautions Comment: impulsive, aphasic tendencies, difficulty following  "commands.    Vital Signs Comment: nursing assessing vitals at beginning of session     Objective   Pain:  Pain Assessment  Pain Assessment: Unable to self-report  Cognition:  Cognition  Overall Cognitive Status: Impaired at baseline (h/o dementia)  Orientation Level: Unable to assess (d/t aphasic tendencies. Pt repeating \"I am not today, today, today.\")    General Assessments:  Activity Tolerance  Endurance: Decreased tolerance for upright activites    Sensation  Sensation Comment: unable to assess    Coordination  Movements are Fluid and Coordinated:  (no tremors noted but unable to formally assess)    Postural Control  Postural Control: Impaired  Posture Comment: Fair posture when seated but L towards R in stance    Static Sitting Balance  Static Sitting-Balance Support: Bilateral upper extremity supported, Feet supported  Static Sitting-Level of Assistance: Contact guard  Static Sitting-Comment/Number of Minutes: seated EOB mult min.  Dynamic Sitting Balance  Dynamic Sitting-Balance Support: No upper extremity supported, Feet supported  Dynamic Sitting-Level of Assistance: Contact guard  Dynamic Sitting-Balance: Forward lean  Dynamic Sitting-Comments: able to lean fwd slightly to assist w threading feet through pants. Increased ease on L compared to R - unable to put foot through pants as fluidly.    Static Standing Balance  Static Standing-Balance Support: Left upper extremity supported, Right upper extremity supported  Static Standing-Level of Assistance: Contact guard  Static Standing-Comment/Number of Minutes: Pt standing and attempting to pull up pants. Able to grab L side of pants and pull over hips w/o difficulty. Unable to grab w R side and had difficulty following both verbal and tactile cues to grab R side of pants. slight lean towards R.  Functional Assessments:  Bed Mobility  Bed Mobility: Yes  Bed Mobility 1  Bed Mobility 1: Sitting to supine  Level of Assistance 1: Close supervision  Bed Mobility " Comments 1: 1 trial, able to lift BLE into bed. HOB significantly flat.    Transfers  Transfer: Yes  Transfer 1  Transfer From 1: Bed to  Transfer to 1: Sit, Stand  Technique 1: Sit to stand, Stand to sit  Transfer Device 1: Gait belt  Transfer Level of Assistance 1: Contact guard, +2  Trials/Comments 1: 2-3 trials. poor hand placement and unsteady throughout. slight lean towards R noted but able to stand w/o physical assist.    Ambulation/Gait Training  Ambulation/Gait Training Performed: No (Attempted to amb w patient. Pt unable to follow vcs to place BUE on FWW. Attempted HHA x2 to amb. Pt taking 1-2 small fwd steps and demonstrating heavy lean towards R, requiring mod assist to keep upright. Trial terminated d/t safety concerns)  Extremity/Trunk Assessments:        RLE   RLE :  (functionally strong as evidenced by stand, unable to formally assess d/t pt difficulty in following commands.)  LLE   LLE :  (functionally strong as evidenced by stand, unable to formally assess d/t pt difficulty in following commands.)  Outcome Measures:  [facility] Basic Mobility  Turning from your back to your side while in a flat bed without using bedrails: A little  Moving from lying on your back to sitting on the side of a flat bed without using bedrails: A little  Moving to and from bed to chair (including a wheelchair): A lot  Standing up from a chair using your arms (e.g. wheelchair or bedside chair): A little  To walk in hospital room: A lot  Climbing 3-5 steps with railing: Total  Basic Mobility - Total Score: 14    Encounter Problems       Encounter Problems (Active)       PT Problem       pt will be able to ambulate 10 ft with FWW and minimal assist         Start:  12/19/24    Expected End:  01/02/25            Pt will be able to perform STS transfer with supervision        Start:  12/19/24    Expected End:  01/02/25            Pt will be able to perform all aspects of bed mobility with supervision        Start:  12/19/24     Expected End:  01/02/25            Pt will demonstrate ability to  object from ground from sitting position, without a device and w supervision       Start:  12/19/24    Expected End:  01/02/25            Pt will demo ability to stand for 1 min w BUE support w proper posturing and no excessive lean       Start:  12/19/24    Expected End:  01/02/25               Pain - Adult              Education Documentation  Mobility Training, taught by Ivy Bach, PT at 12/19/2024  4:03 PM.  Learner: Patient  Readiness: Acceptance  Method: Explanation  Response: Verbalizes Understanding, Demonstrated Understanding, Needs Reinforcement  Comment: Pt edu on role of PT and POC, as well as importance of mobility and use of proper AD.    Education Comments  No comments found.

## 2024-12-19 NOTE — NURSING NOTE
"When th patient was woken up for her dose of metoprolol, she seemed very groggy and she is already severely confused at baseline. Initially I thought the dose of trazodone may have been the cause, but it is noted in the chart the patient had already been taking it for quite some time. She was talking with her eyes closed, she would open them when directed. Speech was somewhat slurred, but if you asked her to speak up it was clear. She stated she had to go to the bathroom, when we attempted to stand her for the BS, she could not. She also grabbed her right hand with her left hand as asked \"what's this?\" RRT was called at 0245. Dr. Swan to the bedside. Orders placed for EKG which was NSR. Labs, CXR due to a congested cough, at STAT CT of the head to R/O stroke. UA was resent. Dr. Swan was personally calling Dr. Snyder to update on the status.    0340 patients speech is very slurred, awaiting results of CT scan.    0355 Call placed to son Meño. No answer, message left to return call for an update.    0400 Spoke with son Meño, he has been updated on the events that have taken place this am. He will be unavailable from approx [phone] because here will be on a airplane flight.    0550 Updated Meño that the CT results were negative for anything acute.     "

## 2024-12-20 ENCOUNTER — APPOINTMENT (OUTPATIENT)
Dept: RADIOLOGY | Facility: HOSPITAL | Age: 89
End: 2024-12-20
Payer: MEDICARE

## 2024-12-20 PROBLEM — I48.91 ATRIAL FIBRILLATION, NEW ONSET (MULTI): Status: ACTIVE | Noted: 2024-12-20

## 2024-12-20 LAB
ANION GAP SERPL CALC-SCNC: 10 MMOL/L (ref 10–20)
BACTERIA UR CULT: NORMAL
BACTERIA UR CULT: NORMAL
BASOPHILS # BLD AUTO: 0.04 X10*3/UL (ref 0–0.1)
BASOPHILS NFR BLD AUTO: 0.4 %
BUN SERPL-MCNC: 16 MG/DL (ref 6–23)
CALCIUM SERPL-MCNC: 9 MG/DL (ref 8.6–10.3)
CHLORIDE SERPL-SCNC: 98 MMOL/L (ref 98–107)
CO2 SERPL-SCNC: 26 MMOL/L (ref 21–32)
CREAT SERPL-MCNC: 1.17 MG/DL (ref 0.5–1.05)
EGFRCR SERPLBLD CKD-EPI 2021: 45 ML/MIN/1.73M*2
EOSINOPHIL # BLD AUTO: 0.06 X10*3/UL (ref 0–0.4)
EOSINOPHIL NFR BLD AUTO: 0.6 %
ERYTHROCYTE [DISTWIDTH] IN BLOOD BY AUTOMATED COUNT: 13.2 % (ref 11.5–14.5)
GLUCOSE SERPL-MCNC: 102 MG/DL (ref 74–99)
HCT VFR BLD AUTO: 40.2 % (ref 36–46)
HGB BLD-MCNC: 12.9 G/DL (ref 12–16)
IMM GRANULOCYTES # BLD AUTO: 0.03 X10*3/UL (ref 0–0.5)
IMM GRANULOCYTES NFR BLD AUTO: 0.3 % (ref 0–0.9)
LYMPHOCYTES # BLD AUTO: 2.4 X10*3/UL (ref 0.8–3)
LYMPHOCYTES NFR BLD AUTO: 24.2 %
MCH RBC QN AUTO: 29.6 PG (ref 26–34)
MCHC RBC AUTO-ENTMCNC: 32.1 G/DL (ref 32–36)
MCV RBC AUTO: 92 FL (ref 80–100)
MONOCYTES # BLD AUTO: 1.32 X10*3/UL (ref 0.05–0.8)
MONOCYTES NFR BLD AUTO: 13.3 %
NEUTROPHILS # BLD AUTO: 6.07 X10*3/UL (ref 1.6–5.5)
NEUTROPHILS NFR BLD AUTO: 61.2 %
NRBC BLD-RTO: 0 /100 WBCS (ref 0–0)
PLATELET # BLD AUTO: 326 X10*3/UL (ref 150–450)
POTASSIUM SERPL-SCNC: 4 MMOL/L (ref 3.5–5.3)
RBC # BLD AUTO: 4.36 X10*6/UL (ref 4–5.2)
SODIUM SERPL-SCNC: 130 MMOL/L (ref 136–145)
WBC # BLD AUTO: 9.9 X10*3/UL (ref 4.4–11.3)

## 2024-12-20 PROCEDURE — 36415 COLL VENOUS BLD VENIPUNCTURE: CPT | Performed by: NURSE PRACTITIONER

## 2024-12-20 PROCEDURE — 94760 N-INVAS EAR/PLS OXIMETRY 1: CPT

## 2024-12-20 PROCEDURE — 97530 THERAPEUTIC ACTIVITIES: CPT | Mod: GP,CQ

## 2024-12-20 PROCEDURE — 97535 SELF CARE MNGMENT TRAINING: CPT | Mod: GO

## 2024-12-20 PROCEDURE — G0427 INPT/ED TELECONSULT70: HCPCS | Performed by: PSYCHIATRY & NEUROLOGY

## 2024-12-20 PROCEDURE — 70544 MR ANGIOGRAPHY HEAD W/O DYE: CPT | Performed by: RADIOLOGY

## 2024-12-20 PROCEDURE — 70547 MR ANGIOGRAPHY NECK W/O DYE: CPT | Performed by: RADIOLOGY

## 2024-12-20 PROCEDURE — 2500000002 HC RX 250 W HCPCS SELF ADMINISTERED DRUGS (ALT 637 FOR MEDICARE OP, ALT 636 FOR OP/ED): Mod: IPSPLIT | Performed by: NURSE PRACTITIONER

## 2024-12-20 PROCEDURE — 2500000001 HC RX 250 WO HCPCS SELF ADMINISTERED DRUGS (ALT 637 FOR MEDICARE OP): Performed by: NURSE PRACTITIONER

## 2024-12-20 PROCEDURE — 70551 MRI BRAIN STEM W/O DYE: CPT | Performed by: RADIOLOGY

## 2024-12-20 PROCEDURE — 1200000002 HC GENERAL ROOM WITH TELEMETRY DAILY: Mod: IPSPLIT

## 2024-12-20 PROCEDURE — 70551 MRI BRAIN STEM W/O DYE: CPT

## 2024-12-20 PROCEDURE — 92610 EVALUATE SWALLOWING FUNCTION: CPT | Mod: GN

## 2024-12-20 PROCEDURE — 99232 SBSQ HOSP IP/OBS MODERATE 35: CPT | Performed by: NURSE PRACTITIONER

## 2024-12-20 PROCEDURE — 85025 COMPLETE CBC W/AUTO DIFF WBC: CPT | Performed by: NURSE PRACTITIONER

## 2024-12-20 PROCEDURE — 70544 MR ANGIOGRAPHY HEAD W/O DYE: CPT

## 2024-12-20 PROCEDURE — 80048 BASIC METABOLIC PNL TOTAL CA: CPT | Performed by: NURSE PRACTITIONER

## 2024-12-20 PROCEDURE — 70547 MR ANGIOGRAPHY NECK W/O DYE: CPT

## 2024-12-20 RX ORDER — AMLODIPINE BESYLATE 5 MG/1
10 TABLET ORAL DAILY
Status: DISCONTINUED | OUTPATIENT
Start: 2024-12-21 | End: 2024-12-27 | Stop reason: HOSPADM

## 2024-12-20 RX ORDER — AMLODIPINE BESYLATE 5 MG/1
5 TABLET ORAL DAILY
Status: DISCONTINUED | OUTPATIENT
Start: 2024-12-21 | End: 2024-12-20

## 2024-12-20 RX ADMIN — APIXABAN 2.5 MG: 2.5 TABLET, FILM COATED ORAL at 20:05

## 2024-12-20 RX ADMIN — RALOXIFENE 60 MG: 60 TABLET ORAL at 08:31

## 2024-12-20 RX ADMIN — QUETIAPINE FUMARATE 25 MG: 25 TABLET ORAL at 20:05

## 2024-12-20 RX ADMIN — TRAZODONE HYDROCHLORIDE 50 MG: 50 TABLET ORAL at 20:05

## 2024-12-20 RX ADMIN — ACETAMINOPHEN 650 MG: 325 TABLET ORAL at 08:30

## 2024-12-20 RX ADMIN — APIXABAN 2.5 MG: 2.5 TABLET, FILM COATED ORAL at 08:31

## 2024-12-20 RX ADMIN — METOPROLOL TARTRATE 25 MG: 25 TABLET, FILM COATED ORAL at 08:31

## 2024-12-20 RX ADMIN — LORAZEPAM 0.5 MG: 0.5 TABLET ORAL at 08:31

## 2024-12-20 RX ADMIN — LORAZEPAM 0.5 MG: 0.5 TABLET ORAL at 20:05

## 2024-12-20 RX ADMIN — AMLODIPINE BESYLATE 10 MG: 5 TABLET ORAL at 08:31

## 2024-12-20 RX ADMIN — METOPROLOL TARTRATE 25 MG: 25 TABLET, FILM COATED ORAL at 20:05

## 2024-12-20 ASSESSMENT — COGNITIVE AND FUNCTIONAL STATUS - GENERAL
CLIMB 3 TO 5 STEPS WITH RAILING: TOTAL
MOVING FROM LYING ON BACK TO SITTING ON SIDE OF FLAT BED WITH BEDRAILS: A LITTLE
HELP NEEDED FOR BATHING: A LOT
DAILY ACTIVITIY SCORE: 24
MOBILITY SCORE: 13
STANDING UP FROM CHAIR USING ARMS: A LOT
PERSONAL GROOMING: A LOT
DRESSING REGULAR LOWER BODY CLOTHING: A LITTLE
WALKING IN HOSPITAL ROOM: A LOT
MOBILITY SCORE: 24
EATING MEALS: A LOT
TOILETING: A LOT
DAILY ACTIVITIY SCORE: 13
TURNING FROM BACK TO SIDE WHILE IN FLAT BAD: A LITTLE
MOVING TO AND FROM BED TO CHAIR: A LOT
DRESSING REGULAR UPPER BODY CLOTHING: A LOT

## 2024-12-20 ASSESSMENT — PAIN SCALES - PAIN ASSESSMENT IN ADVANCED DEMENTIA (PAINAD)
NEGVOCALIZATION: OCCASIONAL MOAN/GROAN, LOW SPEECH, NEGATIVE/DISAPPROVING QUALITY
TOTALSCORE: MEDICATION (SEE MAR)
CONSOLABILITY: DISTRACTED OR REASSURED BY VOICE/TOUCH
BODYLANGUAGE: RELAXED
FACIALEXPRESSION: SAD, FRIGHTENED, FROWN
BREATHING: NORMAL
FACIALEXPRESSION: SAD, FRIGHTENED, FROWN
BODYLANGUAGE: RELAXED
CONSOLABILITY: DISTRACTED OR REASSURED BY VOICE/TOUCH
BREATHING: NORMAL
NEGVOCALIZATION: OCCASIONAL MOAN/GROAN, LOW SPEECH, NEGATIVE/DISAPPROVING QUALITY
TOTALSCORE: 3
TOTALSCORE: 2
BREATHING: NORMAL
FACIALEXPRESSION: SAD, FRIGHTENED, FROWN
TOTALSCORE: 2
CONSOLABILITY: NO NEED TO CONSOLE
BODYLANGUAGE: RELAXED

## 2024-12-20 ASSESSMENT — ACTIVITIES OF DAILY LIVING (ADL): HOME_MANAGEMENT_TIME_ENTRY: 14

## 2024-12-20 ASSESSMENT — PAIN SCALES - GENERAL
PAINLEVEL_OUTOF10: 2
PAINLEVEL_OUTOF10: 0 - NO PAIN
PAINLEVEL_OUTOF10: 0 - NO PAIN

## 2024-12-20 ASSESSMENT — PAIN - FUNCTIONAL ASSESSMENT: PAIN_FUNCTIONAL_ASSESSMENT: 0-10

## 2024-12-20 NOTE — CARE PLAN
"The patient's goals for the shift include      The clinical goals for the shift include patient will tolerate ordered medications throughout shift    19:00 Pt confused, does not follow safety measures, ambulated unsteady 2 assist from chair sto bed, went asleep. Speech word salad/incomprehensible.    21:45 Pt woke up, confused, repeatedly saying \"Get it off, help me, help me...\" and removing monitoring, attempting to remove IV (reinforced). Medications given with applesauce.    05:30 Pt incontinent of urine, does not ambulate/stand well, slept most of shift but confused and restless when awake, does not listen to education or follow instruction/safety measures.      "

## 2024-12-20 NOTE — PROGRESS NOTES
"Physical Therapy    Physical Therapy Treatment    Patient Name: Agatha Saucedo  MRN: [id number]  Department: [facility]  Room: [address] [place] [address]  Today's Date: 12/20/2024  Time Calculation  Start Time: 0832  Stop Time: 0848  Time Calculation (min): 16 min         Assessment/Plan   PT Assessment  Cognition Needs: 24hr supervision for safety awareness needed  Physical Needs: 24hr mobility assistance needed  End of Session Communication: Bedside nurse  Assessment Comment:  nursing present intermittently throughout session; increased time provided to attempt to engage pt in session however pt pushing towards PTA and stating \"no, I don't want to\" repeating \"today\" & \"I dont understand\" attempting to aid in navigating through converstaion- unsuccessfully.  End of Session Patient Position: Bed, 3 rail up, Alarm on     PT Plan  Treatment/Interventions: Bed mobility, Transfer training, Gait training, Stair training, Balance training, Neuromuscular re-education, Therapeutic exercise, Therapeutic activity, Strengthening, Endurance training, Home exercise program, Positioning, Postural re-education  PT Plan: Ongoing PT  PT Frequency: 4 times per week  PT Discharge Recommendations: High intensity level of continued care (Discussion with supervising  PT/OT this afternoon- PT  recommending High intensity pending continued progress of Cognitive impairment as OT noting increased improvement compared to yesterday session and AM session today)  Equipment Recommended upon Discharge:  (TBD)  PT Recommended Transfer Status: Assist x2  PT - OK to Discharge: Yes      General Visit Information:   PT  Visit  PT Received On: 12/20/24  Response to Previous Treatment: Patient reporting fatigue but able to participate.  General  Past Medical History Relevant to Rehab: PMHx includes dementia, CKD, HLD, anxiety, HTN, osteoporosis  Prior to Session Communication: Bedside nurse  Patient Position Received: Bed, 3 rail up  General Comment: Pt sleeping heavily at " beginning of session, was able to woken up however demonstated significant confusion and agitation limiting therapeutic interventions    Subjective   Precautions:  Precautions  Medical Precautions: Fall precautions  Precautions Comment: impulsive, aphasic tendencies, difficulty following commands.      Objective   Pain:  Pain Assessment  0-10 (Numeric) Pain Score: 0 - No pain  Cognition:  Cognition  Overall Cognitive Status: Impaired at baseline    Activity Tolerance:  Activity Tolerance  Endurance: Decreased tolerance for upright activites  Activity Tolerance Comments: confusion/agitation interferring with activitiy tolerance this session  Treatments:  Therapeutic Activity  Therapeutic Activity Performed: Yes  Therapeutic Activity 1: seated ROM in bed with intermittent [place] reaching for objects.    Bed Mobility 1  Bed Mobility 1: Rolling right, Rolling left, Scooting, Long sit  Level of Assistance 1: Close supervision  Bed Mobility Comments 1: use of YARY bed rails prn throughout. Performing multiple of each during session    Outcome Measures:  [facility] Basic Mobility  Turning from your back to your side while in a flat bed without using bedrails: A little  Moving from lying on your back to sitting on the side of a flat bed without using bedrails: A little  Moving to and from bed to chair (including a wheelchair): A lot  Standing up from a chair using your arms (e.g. wheelchair or bedside chair): A lot  To walk in hospital room: A lot  Climbing 3-5 steps with railing: Total  Basic Mobility - Total Score: 13      Encounter Problems       Encounter Problems (Active)       PT Problem       pt will be able to ambulate 10 ft with FWW and minimal assist   (Progressing)       Start:  12/19/24    Expected End:  01/02/25            Pt will be able to perform STS transfer with supervision  (Progressing)       Start:  12/19/24    Expected End:  01/02/25            Pt will be able to perform all aspects of bed mobility with  supervision  (Progressing)       Start:  12/19/24    Expected End:  01/02/25            Pt will demonstrate ability to  object from ground from sitting position, without a device and w supervision (Progressing)       Start:  12/19/24    Expected End:  01/02/25            Pt will demo ability to stand for 1 min w BUE support w proper posturing and no excessive lean (Progressing)       Start:  12/19/24    Expected End:  01/02/25               Pain - Adult

## 2024-12-20 NOTE — TELECONSULT
HPI: Telestroke consult at [place]  89 y.o. female with A.fib on Eliquis 2.5mg BID, CKD, HLD, HTN, advanced dementia, who were were consulted on for an acute ischemic stroke    Patient presented on 12/18 with hypotension and generalized weakness , in the hospital her SBP was initially in 90s but reported as low as 70 in facility. She was also in A.fib with . On 12/19 she was noted to have right sided weakness and aphasia, and an MRI was pursued which showed an acute infarct.    Stroke woorkup:  - MRI brain: wedge shaped L MCA parietotemporal infarct  - MRA head and neck: No significant stenosis or occlusion intracranially or in the neck  - TTE : Normal EF, LA mildly dilated  - No LDL or A1c on file  - BMI: 27.22  - Home antithrombotics: None    Last known Well: 12/19 AM  NIH Stroke Scale: 3    Prior Functional Status (Modified [place] Scale):  3 Moderate disability; requiring some help, but able to walk without assistance       Patient Active Problem List    Diagnosis Date Noted    Paroxysmal atrial fibrillation (Multi) 12/18/2024    Benign essential HTN 12/18/2024    Insomnia 12/18/2024    Age-related osteoporosis without current pathological fracture 12/18/2024    Acute-on-chronic kidney injury (CMS-HCC) 12/18/2024    Acute cystitis without hematuria 09/11/2024    Transient hypotension 09/11/2024     Current Facility-Administered Medications   Medication Dose Route Frequency Provider Last Rate Last Admin    acetaminophen (Tylenol) tablet 650 mg  650 mg oral q4h PRN GEMMA Gallegos-CNP   650 mg at 12/20/24 0830    amLODIPine (Norvasc) tablet 10 mg  10 mg oral Daily Clementina Mckeon APRN-CNP   10 mg at 12/20/24 0831    apixaban (Eliquis) tablet 2.5 mg  2.5 mg oral q12h GEMMA Gallegos-CNP   2.5 mg at 12/20/24 0831    LORazepam (Ativan) tablet 0.5 mg  0.5 mg oral q8h PRN Clementina Mckeon APRN-CNP   0.5 mg at 12/20/24 0831    metoprolol tartrate (Lopressor) injection 5 mg  5 mg intravenous  q6h PRN NESS Gallegos        metoprolol tartrate (Lopressor) tablet 25 mg  25 mg oral BID NESS Gallegos   25 mg at 12/20/24 0831    ondansetron (Zofran) injection 4 mg  4 mg intravenous q8h PRN GEMMA Gallegos-TANG        pantoprazole (ProtoNix) EC tablet 40 mg  40 mg oral Daily before breakfast NESS Gallegos        polyethylene glycol (Glycolax, Miralax) packet 17 g  17 g oral Daily PRN Clementina Mckeon APRN-CNP        QUEtiapine (SEROquel) tablet 25 mg  25 mg oral Nightly GEMMA Gallegos-CNP   25 mg at 12/19/24 2151    raloxifene (Evista) tablet 60 mg  60 mg oral Daily NESS Gallegos   60 mg at 12/20/24 0831    traZODone (Desyrel) tablet 50 mg  50 mg oral Nightly GEMMA Gallegos-CNP   50 mg at 12/19/24 2151     Allergies: Fosamax [alendronate], Vesicare [solifenacin], and Zestoretic [lisinopril-hydrochlorothiazide]  Past Medical History:   Diagnosis Date    Anxiety     Benign hypertensive kidney disease with chronic kidney disease stage I through stage IV, or unspecified(403.10)     Bone loss     Chronic kidney disease     Chronic kidney disease, stage III (moderate) (Multi)     Hypertension     Impaired fasting glucose     Insomnia, unspecified     Mixed hyperlipidemia     Osteoporosis     Senile dementia uncomp (Multi)     Vitamin D deficiency      History reviewed. No pertinent surgical history.  No family history on file.  Social History     Tobacco Use    Smoking status: Never     Passive exposure: Never    Smokeless tobacco: Never   Substance Use Topics    Alcohol use: Never       Exam:  Vitals:    12/20/24 0821   BP:    Pulse:    Resp:    Temp:    SpO2: 96%     Virtual exam:  Physical exam limited by nature of virtual visit     Mentals status: Alert, oriented to self only, able to follow some simple command but has significant difficulty in comprehension, memory impaired, no insight into medical condition. Mild receptive  aphasia     CRANIAL NERVES:  EOM intact in all directions with no nystagmus, symmetrical facial strength and sensation, tongue and palate with no deviation     MOTOR:  RUE and RLE antigravity with a drift  LUE and LLE no drift     COORDINATION: Finger to nose intact bilaterally.      Imaging Results:  MRI: MR brain wo IV contrast    Result Date: 12/20/2024  Acute infarct involving the left parietal and temporal lobes. There is associated T2 and FLAIR signal abnormality with mild local mass effect. No midline shift. No associated hemorrhage.   MACRO: None   Signed by: Melody Sloan 12/20/2024 10:42 AM Dictation workstation:   [id number]   CT Head: CT head wo IV contrast    Result Date: 12/19/2024  No evidence of acute cortical infarct or intracranial hemorrhage.     MACRO: None   Signed by: Jaylan Briggs 12/19/2024 5:44 AM Dictation workstation:   [id number]   Echo: No echocardiogram results found for the past 14 days    ASSESSMENT:  Agatha Saucedo is a 89 y.o. female who presented with R sided weakness and aphasia and was found to have an embolic appearing L MCA branch infarct    Etiology: Cardioembolic from A.fib    Diagnosis:   Ischemic Stroke        RECOMMENDATIONS:   - Agree with starting Eliquis 2.5mg BID given concern for fall risk  - Check LDL and start statin if LDL >70  - Check A1c  - Place referral to outpatient Vascular Neurology       Consult completed by: TELEPHONE and VIDEO interactive communication was used to provide this telehealth service. Time includes consultation with ED provider and extensive review of data- history, medical records, lab/radiology/medical test results, neuroimaging studies:  30 minutes was spent in INITIAL telehealth consultation

## 2024-12-20 NOTE — PROGRESS NOTES
Speech-Language Pathology    SLP Adult Inpatient Speech-Language Pathology Clinical Swallow Evaluation    Patient Name: Agatha Saucedo  MRN: [id number]  Today's Date: 12/20/2024   Time Calculation  Start Time: 1245  Stop Time: 1300  Time Calculation (min): 15 min         Current Problem:   1. Atrial fibrillation, new onset (Multi)  apixaban (Eliquis) 2.5 mg tablet    metoprolol tartrate (Lopressor) 25 mg tablet    Referral to Primary Care - Family Practice    Referral to Cardiology      2. Hypotension, unspecified hypotension type        3. Acute kidney injury superimposed on chronic kidney disease (CMS-HCC)        4. Paroxysmal atrial fibrillation (Multi)  Transthoracic Echo (TTE) Complete    Transthoracic Echo (TTE) Complete    apixaban (Eliquis) 2.5 mg tablet    metoprolol tartrate (Lopressor) 25 mg tablet    Referral to Primary [facility]    Referral to Cardiology          Recommendations:  Solid consistency: IDDSI 7/regular solids   Liquid consistency: IDDSI 0/thin liquids   Medication administration: Whole, in thin liquid  Compensatory swallow strategies: Seated upright - as close to 90 degrees as possible, Remain upright for 20-30 minutes after meals, Full supervision during meals, Alternate solids and liquids, Single sips, Small bites/sips, and Eat/feed slowly  MBSS: No    Assessment:  Pt presents with functional swallow with no suspected pharyngeal dysphagia upon completion of CSE. However, silent aspiration cannot be ruled out at the bedside. Given that there is no suspected laryngeal dysfunction and/or airway compromise, pt is at a low risk of developing future pulmonary complications associated with aspiration given the identified risk factors and prognostic factors.  Prognosis: Excellent    Plan:   Based on results of CSE, pt's swallow is functional for age. No dysphagia therapy indicated at this time, reconsult ST as needed.   Inpatient/Swing Bed or Outpatient: Inpatient  Discussed POC:  "Patient, Nursing  Discussed Risks/Benefits: Yes  Patient/Caregiver Agreeable: Yes   Discharge recommendation: Home with no further SLP  ST OK to Discharge: Yes        Impressions:  Current Diet: IDDSI 7/regular solids and IDDSI 0/thin liquids   Relevant imaging results: MRI Brain: \"Acute infarct involving the left parietal and temporal lobes. There is associated T2 and FLAIR signal abnormality with mild local mass effect. No midline shift. No associated hemorrhage.\"  Temperature: afebrile  Labs: Na low and Creatinine high; concern for dehydration, confusion/AMS, weakness, lethargy, and fatigue  Respiratory Status: Room air; SPO2 96; RR18  Cranial Nerve Exam: grossly WFL  Oral [facility] Exam: Dentition: , Natural/Adequate, Plaque build-up/decay noted, Mucosa: , pink/moist, free of obvious lesions, and Compromised oral health/hygiene  [place] Swallow Protocol: PASS  Textures Administered: 3 0z Water - [place] Swallow, Pudding, and Selwyn Cracker/Grere Doone   Clinical Observations: Oral phase - No anterior loss of liquids or solids. Timely and adequate mastication of regular solids. No oral residuals observed. Pharyngeal phase - no overt s/s of aspiration across all consistencies presented during assessment.   Risk factors for Aspiration PNA: Age (>65)  Prognostic factors that mitigate risk: Stable immune status, Stable respiratory status, and Mobility     Goals:   N/A - Eval Only    General Information:  SLP Received On: 12/20/24  Patient Class: Inpatient   Living Environment: Home  Ordering Physician: Luigi Chacon MD  Reason for Referral: Suspected oral/pharyngeal dysphagia, r/o aspiration, determine if MBSS is needed  Referred By: GEMMA Gallegos-CNP  Past Medical History Relevant to Rehab: PMHx includes dementia, CKD, HLD, anxiety, HTN, osteoporosis  Prior to Session Communication: Bedside nurse  [employer] Diet: Regular/Thin  Current Diet : Regular/Thin    Pain:   Pain Assessment: 0-10  0-10 (Numeric) Pain Score: 0 - " No pain               Treatment  Treatment provided: No      Education:  Learner Patient   Barriers to Learning Cognitive limitations   Method Verbal   Education - Topic ST provided patient education regarding role of ST, purpose of assessment, clinical impressions, and recommendations. Patient verbalized questionable full comprehension, consistent with cognitive status. Education will be reinforced. ST further coordinated with RN regarding recommendations and precautions per this assessment, with RN verbalizing understanding.     Outcome    Verbalized understanding, Verbalized agreement, Education Goals: , and Meets goals/outcomes

## 2024-12-20 NOTE — PROGRESS NOTES
12/20/24 1508   Discharge Planning   Living Arrangements Other (Comment)   Support Systems Other (Comment)   Assistance Needed Awaiting pre-cert for [facility]   Type of Residence Assisted living   Care Facility Name [facility]   Who is requesting discharge planning? Other (Comment)  (son)   Home or Post Acute Services Post acute facilities (Rehab/SNF/etc)   Type of Post Acute Facility Services Skilled nursing   Expected Discharge Disposition SNF   Does the patient need discharge transport arranged? Yes   RoundTrip coordination needed? Yes   Has discharge transport been arranged? No   Patient Choice   Provider Choice list and CMS website (https://medicare.gov/care-compare#search) for post-acute Quality and Resource Measure Data were provided and reviewed with: Family   Patient / Family choosing to utilize agency / facility established prior to hospitalization No   Intensity of Service   Intensity of Service >30 min     Met with pt/son/DIL to discuss rehab needs. She is positive CVA and is stable for discharge today. Son was given preference list of SNFs and chose [facility]. Referral sent to [facility] and pre-cert initiated. Son is going to the [facility] to get some f her clothing and discuss whether to move her things out of wait until after the first. She is out of private pay money and has started the Medicaid process. JAIDEN Solomon

## 2024-12-20 NOTE — PROGRESS NOTES
Occupational Therapy    Occupational Therapy Treatment    Name: Agatha Saucedo  MRN: [id number]  Department: [facility] MRI  Room: [address] [place] [address]-  Date: 12/20/24  Time Calculation  Start Time: 0916  Stop Time: 0930  Time Calculation (min): 14 min    Assessment:  OT Assessment: Patient limited by poor R sided coordinaiton and weakness. Patient session cut short d/t going to MRI. Patient was able to show increased participation this date with less confusion noted. Will xontinue to engage patient in increased ADLs for return to PLOF. Rec high intensity rehab follow CVA dx found on MRI.   Prognosis: Fair  Barriers to Discharge Home: Caregiver assistance, Cognition needs, Physical needs  Caregiver Assistance: Caregiver assistance needed per identified barriers - however, level of patient's required assistance exceeds assistance available at home  Cognition Needs: 24hr supervision for safety awareness needed, Medication and/or medical management daily assist needed, Recollection or understanding of precautions/restrictions limited, Insight of patient limited regarding functional ability/needs, Cognition-related high falls risk  Physical Needs: 24hr mobility assistance needed, 24hr ADL assistance needed, High falls risk due to function or environment  Evaluation/Treatment Tolerance: Other (Comment), Patient limited by fatigue (pt limited by cognition)  End of Session Communication: PCT/NA/CTA  End of Session Patient Position:  (with technition from MRI tkaing patient to scan.)    Plan:  Treatment Interventions: ADL retraining, Functional transfer training, Endurance training, Neuromuscular reeducation  OT Frequency: 4 times per week  OT Discharge Recommendations: High intensity level of continued care, 24 hr supervision due to cognition  Equipment Recommended upon Discharge:  (TBD)  OT Recommended Transfer Status: Stand by assist, Assist of 2  OT - OK to Discharge: Yes  Based on completed evaluation and care plan recommendations, no  "barriers to discharge to next site of care.    Subjective   Previous Visit Info:  OT Received On: 12/20/24    General:  General  Reason for Referral: ADLs/safety  Referred By: NESS Gallegos  Past Medical History Relevant to Rehab: Pt presenting to the ED with increased weakness and founbd to have new onset of a fib. Now presenting with OT eval for safety and to establish if patient can return to AL. (PMH: dementia, CKD, HLD, anxiety, HTN, osteoporosis, insomnia)  Family/Caregiver Present: Yes  Caregiver Feedback: son arriving at [facility] reporting patient is not at baseline functioning.  Co-Treatment: PT  Co-Treatment Reason: d/t patient complexity and to increase safety  Prior to Session Communication: Bedside nurse, PCT/NA/CTA  Patient Position Received: Bed, 4 rail up, Alarm on  General Comment: Pt pleseant and cooperative throughout session. Patient session interupted d/t MRI.    Precautions:  Medical Precautions: Fall precautions, Neuro precautions  Precautions Comment: impulsive, aphasic tendencies, difficulty following commands.    Vital Signs (Past 2hrs)        Date/Time Vitals Session Patient Position Pulse Resp SpO2 BP MAP (mmHg)    12/20/24 0821 --  --  --  --  96 %  --  --                   Pain Assessment:  Pain Assessment  Pain Assessment: Unable to self-report     Objective   Cognition:  Overall Cognitive Status: Impaired, Impaired at baseline  Orientation Level: Unable to assess (pt just repeatredly stating to therapists, \"I am not today, today, today...\")  Safety/Judgement: Exceptions to WFL  Complex Functional Tasks: Maximal  Novel Situations: Maximal  Routine Tasks: Maximal  Insight: Moderate  Impulsive: Severely    Activities of Daily Living:   Grooming  Grooming Level of Assistance: Setup, Moderate assistance, Moderate verbal cues  Grooming Where Assessed: Sitting sinkside  Grooming Comments: Patient completing oral care with incerased assistance d/t increased weakness and " "decreased coordiantion of R UE. OT providing intermitant hand over hand to assist though patient easily frustrated by assistance reporting, \"I can do it myself\" though easily dropping items.    LE Dressing  LE Dressing: Yes  Adult Briefs Level of Assistance: Setup, Contact guard, Minimal verbal cues  LE Dressing Where Assessed: Edge of bed  LE Dressing Comments: cues to assume figure four technique taking increased period of time to follow. Difficulyt including R hand in dressing d/t weakness and impaired coordiantion of grasp.     Bed Mobility/Transfers:   Bed Mobility  Bed Mobility: Yes  Bed Mobility 1  Bed Mobility 1: Supine to sitting  Level of Assistance 1: Close supervision  Bed Mobility Comments 1: HOB elevated, rails used. Increased period of time to complete.    Transfers  Transfer: Yes  Transfer 1  Transfer From 1: Bed to  Transfer to 1: Stand  Technique 1: Sit to stand, Stand to sit  Transfer Device 1: Gait belt  Transfer Level of Assistance 1: Contact guard, Maximum verbal cues, Minimal tactile cues  Trials/Comments 1: pt impulsive not waiting for OT to place walker in front of her. OT holding onto patients wasit throughout stand to pull up pants.  Transfers 2  Transfer From 2: Bed to  Transfer to 2: Chair with arms  Technique 2: Stand pivot, To right  Transfer Device 2: Gait belt  Transfer Level of Assistance 2: Minimum assistance, Moderate verbal cues  Trials/Comments 2: pt unable to sequence walker not following cues to place hands on walker. Patient only able to sequence trnasfer with holding hands of OT. Patient leaning heaviliy to the right with poor eccentric control into the chair.  Transfers 3  Transfer From 3: Chair with arms to  Transfer to 3: Wheelchair  Technique 3: Sit to stand, Stand to sit  Transfer Device 3: Gait belt  Transfer Level of Assistance 3: Contact guard, Moderate verbal cues  Trials/Comments 3: again patient holding OT hands and leaning heavily to the right; poor eccentric " control into chair regardless of cues.    Outcome Measures:  [facility] Daily Activity  Putting on and taking off regular lower body clothing: A little  Bathing (including washing, rinsing, drying): A lot  Putting on and taking off regular upper body clothing: A lot  Toileting, which includes using toilet, bedpan or urinal: A lot  Taking care of personal grooming such as brushing teeth: A lot  Eating Meals: A lot  Daily Activity - Total Score: 13    Education Documentation  ADL Training, taught by Erendira Cummings OT at 12/19/2024  4:35 PM.  Learner: Patient  Readiness: Acceptance  Method: Explanation  Response: Needs Reinforcement, No Evidence of Learning  Comment: safety with transfers, use of call bell.    Education Comments  No comments found.      Goals:  Encounter Problems       Encounter Problems (Active)       ADLs       Patient will participate in bathing with supervision/set-up while seated at sink or standing at sink using PRN adaptive equipment.         Start:  12/19/24    Expected End:  01/02/25            Patient will participate in upper and lower body dressing with supervision/set-up while sitting in chair or standing using PRN adaptive equipment.    (Progressing)       Start:  12/19/24    Expected End:  01/02/25            Patient will participate in all tolieting activities with supervision/set-up using an elevated toilet seat and grab bars.        Start:  12/19/24    Expected End:  01/02/25               BALANCE       Pt will maintain dynamic standing balance during ADL task with supervision level of assistance in order to demonstrate decreased risk of falling and improved postural control. (Progressing)       Start:  12/19/24    Expected End:  01/02/25               COGNITION/SAFETY       Pt will follow simple commands 100% of the time during OT tx session to increase independence and safety with ADLs.  (Progressing)       Start:  12/19/24    Expected End:  01/02/25               TRANSFERS        Patient will participate in bed mobility with supervision/set-up to increase safety with mobility at home. (Progressing)       Start:  12/19/24    Expected End:  01/02/25            Patient will participate in sit to stand transfers with supervision/set-up using LRAD to increase safety with ADLs.  (Progressing)       Start:  12/19/24    Expected End:  01/02/25

## 2024-12-21 LAB
CHOLEST SERPL-MCNC: 192 MG/DL (ref 0–199)
CHOLESTEROL/HDL RATIO: 3.9
HDLC SERPL-MCNC: 49.3 MG/DL
HOLD SPECIMEN: NORMAL
NON-HDL CHOLESTEROL: 143 MG/DL (ref 0–149)

## 2024-12-21 PROCEDURE — 2500000001 HC RX 250 WO HCPCS SELF ADMINISTERED DRUGS (ALT 637 FOR MEDICARE OP): Mod: IPSPLIT | Performed by: NURSE PRACTITIONER

## 2024-12-21 PROCEDURE — 99231 SBSQ HOSP IP/OBS SF/LOW 25: CPT | Performed by: NURSE PRACTITIONER

## 2024-12-21 PROCEDURE — 2500000002 HC RX 250 W HCPCS SELF ADMINISTERED DRUGS (ALT 637 FOR MEDICARE OP, ALT 636 FOR OP/ED): Mod: IPSPLIT | Performed by: NURSE PRACTITIONER

## 2024-12-21 PROCEDURE — 36415 COLL VENOUS BLD VENIPUNCTURE: CPT | Mod: IPSPLIT | Performed by: INTERNAL MEDICINE

## 2024-12-21 PROCEDURE — 1100000001 HC PRIVATE ROOM DAILY: Mod: IPSPLIT

## 2024-12-21 PROCEDURE — 94760 N-INVAS EAR/PLS OXIMETRY 1: CPT | Mod: IPSPLIT

## 2024-12-21 PROCEDURE — 36415 COLL VENOUS BLD VENIPUNCTURE: CPT | Mod: IPSPLIT | Performed by: NURSE PRACTITIONER

## 2024-12-21 PROCEDURE — 83718 ASSAY OF LIPOPROTEIN: CPT | Mod: IPSPLIT | Performed by: NURSE PRACTITIONER

## 2024-12-21 RX ADMIN — METOPROLOL TARTRATE 25 MG: 25 TABLET, FILM COATED ORAL at 10:17

## 2024-12-21 RX ADMIN — RALOXIFENE 60 MG: 60 TABLET ORAL at 10:17

## 2024-12-21 RX ADMIN — AMLODIPINE BESYLATE 10 MG: 5 TABLET ORAL at 10:17

## 2024-12-21 RX ADMIN — LORAZEPAM 0.5 MG: 0.5 TABLET ORAL at 11:57

## 2024-12-21 RX ADMIN — TRAZODONE HYDROCHLORIDE 50 MG: 50 TABLET ORAL at 20:18

## 2024-12-21 RX ADMIN — APIXABAN 2.5 MG: 2.5 TABLET, FILM COATED ORAL at 10:17

## 2024-12-21 RX ADMIN — APIXABAN 2.5 MG: 2.5 TABLET, FILM COATED ORAL at 20:18

## 2024-12-21 RX ADMIN — METOPROLOL TARTRATE 25 MG: 25 TABLET, FILM COATED ORAL at 20:18

## 2024-12-21 RX ADMIN — QUETIAPINE FUMARATE 25 MG: 25 TABLET ORAL at 20:18

## 2024-12-21 RX ADMIN — LORAZEPAM 0.5 MG: 0.5 TABLET ORAL at 20:18

## 2024-12-21 ASSESSMENT — COGNITIVE AND FUNCTIONAL STATUS - GENERAL
TOILETING: A LITTLE
WALKING IN HOSPITAL ROOM: A LITTLE
MOBILITY SCORE: 21
DAILY ACTIVITIY SCORE: 23
MOVING TO AND FROM BED TO CHAIR: A LITTLE
CLIMB 3 TO 5 STEPS WITH RAILING: A LITTLE

## 2024-12-21 ASSESSMENT — PAIN SCALES - WONG BAKER: WONGBAKER_NUMERICALRESPONSE: NO HURT

## 2024-12-21 ASSESSMENT — PAIN - FUNCTIONAL ASSESSMENT: PAIN_FUNCTIONAL_ASSESSMENT: WONG-BAKER FACES

## 2024-12-21 ASSESSMENT — PAIN SCALES - PAIN ASSESSMENT IN ADVANCED DEMENTIA (PAINAD)
TOTALSCORE: 0
CONSOLABILITY: NO NEED TO CONSOLE
BODYLANGUAGE: RELAXED
BREATHING: NORMAL
FACIALEXPRESSION: SMILING OR INEXPRESSIVE

## 2024-12-21 NOTE — CARE PLAN
The patient's goals for the shift include      The clinical goals for the shift include pt will get a restful nights sleep  Patient rested throughout shift. Denies pain. Call light within reach. Will continue with poc.

## 2024-12-21 NOTE — CARE PLAN
The patient's goals for the shift include      The clinical goals for the shift include Patient will not get out of bed by herself this shift    Patient remains alert to self with word salad. Continent of B&B. BSC utilized. Patient able to bear wt and ambulate with assistance.  Right side deficits. Awaiting pre cert for [facility].  Patient resting in bed, call button in reach.

## 2024-12-21 NOTE — NURSING NOTE
Patient had her MRI scan today. She worked with therapy and will be going to rehab when discharged. Patient is confused at baseline and impulsive. Set off the bed alarm several times by getting out of bed.

## 2024-12-21 NOTE — PROGRESS NOTES
"Agatha Saucedo is a 89 y.o. female on day 0 of admission presenting with Paroxysmal atrial fibrillation (Multi).      Subjective   \"It looks like Im eating, right? (As she pushes food around her plate with minimal small bites)  Pleasantly confused - notable RUE ataxia, confusion and R>L LE weakness - son states this is different than baseline - he also states speech is much less clear        Objective     Last Recorded Vitals  /69 (BP Location: Right arm)   Pulse 69   Temp 36.7 °C (98.1 °F) (Temporal)   Resp 16   Wt 63.2 kg (139 lb 6.4 oz)   SpO2 96%   Intake/Output last 3 Shifts:    Intake/Output Summary (Last 24 hours) at 12/20/2024 1917  Last data filed at 12/20/2024 1700  Gross per 24 hour   Intake 820 ml   Output --   Net 820 ml       Admission Weight  Weight: 63.2 kg (139 lb 6.4 oz) (12/18/24 1315)    Daily Weight  12/18/24 : 63.2 kg (139 lb 6.4 oz)    Image Results  MR brain wo IV contrast  Narrative: Interpreted By:  Melody Sloan,   STUDY:  MR BRAIN WO IV CONTRAST;  12/20/2024 10:36 am      INDICATION:  Signs/Symptoms:confusion, leaning to the left.          COMPARISON:  None.      ACCESSION NUMBER(S):  [id number]      ORDERING CLINICIAN:  JOSE A PRATT      TECHNIQUE:  Axial T2, FLAIR, DWI, gradient echo T2 and sagittal and coronal T1  weighted images of brain were acquired.      FINDINGS:  CSF Spaces: The ventricles, sulci and basal cisterns are prominent  compatible with age related involutional changes and moderate volume  loss. There is no extra-axial fluid collection.      Parenchyma: There is cortical and subcortical diffusion restriction  within the left temporal and parietal lobes compatible with an acute  infarct. There are few punctate foci of cortical diffusion  restriction within the left parietal lobe compatible with additional  small cortical infarcts. There is associated T2 and FLAIR signal  abnormality. There is no evidence of hemorrhage. There is mild local  mass effect " with effacement of the sulci. No midline shift. There is  no focal parenchymal signal abnormality.  There is no mass effect or  midline shift.      Paranasal Sinuses and Mastoids: Visualized paranasal sinuses and  mastoid air cells are predominantly clear.      Impression: Acute infarct involving the left parietal and temporal lobes. There  is associated T2 and FLAIR signal abnormality with mild local mass  effect. No midline shift. No associated hemorrhage.      MACRO:  None      Signed by: Melody Sloan 12/20/2024 10:42 AM  Dictation workstation:   [id number]  MR angio head wo IV contrast  Narrative: Interpreted By:  Melody Sloan,   STUDY:  MR ANGIO HEAD WO IV CONTRAST;  12/20/2024 10:25 am      INDICATION:  Signs/Symptoms:dizziness.          COMPARISON:  None.      ACCESSION NUMBER(S):  [id number]      ORDERING CLINICIAN:  JOSE A PRATT      TECHNIQUE:  Time-of-flight MRA of the head was performed. The images were  reviewed as source images and maximum intensity projections.      FINDINGS:  The examination is moderately degraded by patient motion artifact.      Anterior circulation:    Grossly there is expected flow signal in  bilateral intracranial internal carotid arteries, bilateral carotid  terminals, bilateral proximal anterior and middle cerebral arteries.      Posterior circulation:    Grossly the bilateral intracranial  vertebral arteries, vertebrobasilar junction, basilar artery and  proximal posterior cerebral arteries demonstrate expected flow  signal. The right posterior cerebral artery is predominantly supplied  by robust posterior communicating artery.      Impression: The examination is moderately degraded by patient motion artifact.  1. Grossly there is no evidence for hemodynamically significant  stenosis or large branch vessel cutoffs of the visualized  intracranial vasculature.      MACRO:  None      Signed by: Melody Sloan 12/20/2024 10:31 AM  Dictation workstation:   [id number]  MR angio  neck wo IV contrast  Narrative: Interpreted By:  Melody Sloan,   STUDY:  MR ANGIO NECK WO IV CONTRAST;  12/20/2024 10:20 am      INDICATION:  Signs/Symptoms:dizziness.          COMPARISON:  None.      ACCESSION NUMBER(S):  [id number]      ORDERING CLINICIAN:  JOSE A PRATT      TECHNIQUE:  Time of flight MRA of the neck was performed. The images were  reviewed as source images and maximum intensity projections.      FINDINGS:  The source images are mildly degraded by artifact.      Right carotid vessels:  There is expected flow signal in the  visualized portion of the common carotid artery.  There is mild  attenuation of flow signal at the carotid bifurcation which may be  secondary to flow related artifact. The internal carotid artery in  the neck demonstrates expected flow signal.      Left carotid vessels:   There is expected flow signal in the  visualized portion of the common carotid artery.  There is mild  attenuation of flow signal at the carotid bifurcation which may be  secondary to flow related artifact. The internal carotid artery in  the neck demonstrates expected flow signal.      Vertebral vessels:   The visualized segments of the cervical  vertebral arteries demonstrate expected flow signal.      Impression: No evidence of significant stenosis on MRA of the neck.      MACRO:  None      Signed by: Melody Sloan 12/20/2024 10:27 AM  Dictation workstation:   [id number]      Physical Exam  HENT:      Head: Normocephalic.      Mouth/Throat:      Mouth: Mucous membranes are moist.   Eyes:      Extraocular Movements: Extraocular movements intact.   Cardiovascular:      Rate and Rhythm: Rhythm irregular.   Musculoskeletal:      Cervical back: Normal range of motion.   Neurological:      Mental Status: She is alert.         Relevant Results           Scheduled medications  [START ON 12/21/2024] amLODIPine, 5 mg, oral, Daily  apixaban, 2.5 mg, oral, q12h  metoprolol tartrate, 25 mg, oral, BID  pantoprazole,  40 mg, oral, Daily before breakfast  QUEtiapine, 25 mg, oral, Nightly  raloxifene, 60 mg, oral, Daily  traZODone, 50 mg, oral, Nightly      Continuous medications     PRN medications  PRN medications: acetaminophen, LORazepam, metoprolol, ondansetron, polyethylene glycol  Results for orders placed or performed during the hospital encounter of 12/18/24 (from the past 24 hours)   Basic metabolic panel   Result Value Ref Range    Glucose 102 (H) 74 - 99 mg/dL    Sodium 130 (L) 136 - 145 mmol/L    Potassium 4.0 3.5 - 5.3 mmol/L    Chloride 98 98 - 107 mmol/L    Bicarbonate 26 21 - 32 mmol/L    Anion Gap 10 10 - 20 mmol/L    Urea Nitrogen 16 6 - 23 mg/dL    Creatinine 1.17 (H) 0.50 - 1.05 mg/dL    eGFR 45 (L) >60 mL/min/1.73m*2    Calcium 9.0 8.6 - 10.3 mg/dL   CBC and Auto Differential   Result Value Ref Range    WBC 9.9 4.4 - 11.3 x10*3/uL    nRBC 0.0 0.0 - 0.0 /100 WBCs    RBC 4.36 4.00 - 5.20 x10*6/uL    Hemoglobin 12.9 12.0 - 16.0 g/dL    Hematocrit 40.2 36.0 - 46.0 %    MCV 92 80 - 100 fL    MCH 29.6 26.0 - 34.0 pg    MCHC 32.1 32.0 - 36.0 g/dL    RDW 13.2 11.5 - 14.5 %    Platelets 326 150 - 450 x10*3/uL    Neutrophils % 61.2 40.0 - 80.0 %    Immature Granulocytes %, Automated 0.3 0.0 - 0.9 %    Lymphocytes % 24.2 13.0 - 44.0 %    Monocytes % 13.3 2.0 - 10.0 %    Eosinophils % 0.6 0.0 - 6.0 %    Basophils % 0.4 0.0 - 2.0 %    Neutrophils Absolute 6.07 (H) 1.60 - 5.50 x10*3/uL    Immature Granulocytes Absolute, Automated 0.03 0.00 - 0.50 x10*3/uL    Lymphocytes Absolute 2.40 0.80 - 3.00 x10*3/uL    Monocytes Absolute 1.32 (H) 0.05 - 0.80 x10*3/uL    Eosinophils Absolute 0.06 0.00 - 0.40 x10*3/uL    Basophils Absolute 0.04 0.00 - 0.10 x10*3/uL     MR brain wo IV contrast    Result Date: 12/20/2024  Interpreted By:  Melody Sloan, STUDY: MR BRAIN WO IV CONTRAST;  12/20/2024 10:36 am   INDICATION: Signs/Symptoms:confusion, leaning to the left.     COMPARISON: None.   ACCESSION NUMBER(S): [id number]   ORDERING  CLINICIAN: JOSE A PRATT   TECHNIQUE: Axial T2, FLAIR, DWI, gradient echo T2 and sagittal and coronal T1 weighted images of brain were acquired.   FINDINGS: CSF Spaces: The ventricles, sulci and basal cisterns are prominent compatible with age related involutional changes and moderate volume loss. There is no extra-axial fluid collection.   Parenchyma: There is cortical and subcortical diffusion restriction within the left temporal and parietal lobes compatible with an acute infarct. There are few punctate foci of cortical diffusion restriction within the left parietal lobe compatible with additional small cortical infarcts. There is associated T2 and FLAIR signal abnormality. There is no evidence of hemorrhage. There is mild local mass effect with effacement of the sulci. No midline shift. There is no focal parenchymal signal abnormality.  There is no mass effect or midline shift.   Paranasal Sinuses and Mastoids: Visualized paranasal sinuses and mastoid air cells are predominantly clear.       Acute infarct involving the left parietal and temporal lobes. There is associated T2 and FLAIR signal abnormality with mild local mass effect. No midline shift. No associated hemorrhage.   MACRO: None   Signed by: Melody Sloan 12/20/2024 10:42 AM Dictation workstation:   [id number]    MR angio head wo IV contrast    Result Date: 12/20/2024  Interpreted By:  Melody Sloan, STUDY: MR ANGIO HEAD WO IV CONTRAST;  12/20/2024 10:25 am   INDICATION: Signs/Symptoms:dizziness.     COMPARISON: None.   ACCESSION NUMBER(S): [id number]   ORDERING CLINICIAN: JOSE A PRATT   TECHNIQUE: Time-of-flight MRA of the head was performed. The images were reviewed as source images and maximum intensity projections.   FINDINGS: The examination is moderately degraded by patient motion artifact.   Anterior circulation:    Grossly there is expected flow signal in bilateral intracranial internal carotid arteries, bilateral carotid terminals,  bilateral proximal anterior and middle cerebral arteries.   Posterior circulation:    Grossly the bilateral intracranial vertebral arteries, vertebrobasilar junction, basilar artery and proximal posterior cerebral arteries demonstrate expected flow signal. The right posterior cerebral artery is predominantly supplied by robust posterior communicating artery.       The examination is moderately degraded by patient motion artifact. 1. Grossly there is no evidence for hemodynamically significant stenosis or large branch vessel cutoffs of the visualized intracranial vasculature.   MACRO: None   Signed by: Melody Sloan 12/20/2024 10:31 AM Dictation workstation:   [id number]    MR angio neck wo IV contrast    Result Date: 12/20/2024  Interpreted By:  Melody Sloan, STUDY: MR ANGIO NECK WO IV CONTRAST;  12/20/2024 10:20 am   INDICATION: Signs/Symptoms:dizziness.     COMPARISON: None.   ACCESSION NUMBER(S): [id number]   ORDERING CLINICIAN: JOSE A PRATT   TECHNIQUE: Time of flight MRA of the neck was performed. The images were reviewed as source images and maximum intensity projections.   FINDINGS: The source images are mildly degraded by artifact.   Right carotid vessels:  There is expected flow signal in the visualized portion of the common carotid artery.  There is mild attenuation of flow signal at the carotid bifurcation which may be secondary to flow related artifact. The internal carotid artery in the neck demonstrates expected flow signal.   Left carotid vessels:   There is expected flow signal in the visualized portion of the common carotid artery.  There is mild attenuation of flow signal at the carotid bifurcation which may be secondary to flow related artifact. The internal carotid artery in the neck demonstrates expected flow signal.   Vertebral vessels:   The visualized segments of the cervical vertebral arteries demonstrate expected flow signal.       No evidence of significant stenosis on MRA of the  neck.   MACRO: None   Signed by: Melody Sloan 12/20/2024 10:27 AM Dictation workstation:   [id number]    Transthoracic Echo (TTE) Complete    Result Date: 12/19/2024   [facility], [address] [place] [address]               Tel [phone] and Fax [phone] TRANSTHORACIC ECHOCARDIOGRAM REPORT  Patient Name:       HILDA RUIZ     Reading Physician:    [id number] Hussein Obrien MD Study Date:         12/19/2024          Ordering Provider:    [id number] JOSE A PRATT MRN/PID:            [id number]            Fellow: Accession#:         [id number]        Nurse: Date of Birth/Age:  1935 / 89      Sonographer:          Dorothy GUEVARAT, RDMS, RDCS Gender assigned at  F                   Additional Staff: Birth: Height:             152.40 cm           Admit Date:           12/18/2024 Weight:             63.05 kg            Admission Status:     Inpatient -                                                               Routine BSA / BMI:          1.60 m2 / 27.15     Encounter#:           [id number]                     kg/m2 Blood Pressure:     139/65 mmHg         Department Location:  [facility] Study Type:    TRANSTHORACIC ECHO (TTE) COMPLETE Diagnosis/ICD: Paroxysmal atrial fibrillation-I48.0 Indication:    A Fib CPT Code:      Echo Complete w Full [id number] Patient History: Pertinent History: A-Fib, HTN and Hyperlipidemia. CKD, Dementia. Study Detail: The following Echo studies were performed: 2D, M-Mode, Doppler and               color flow. Technically challenging study due to severe dementia,               small rib space. The patient was awake.  PHYSICIAN INTERPRETATION: Left Ventricle: Left ventricular ejection fraction is normal, by visual estimate at 55-60%. There is mild  concentric left ventricular hypertrophy. There are no regional left ventricular wall motion abnormalities. The left ventricular cavity size is normal. There is mildly increased posterior left ventricular wall thickness. Left ventricular diastolic filling is indeterminate. Left Atrium: The left atrium is mildly dilated. Right Ventricle: The right ventricle is mildly enlarged. There is normal right ventricular global systolic function. Right Atrium: The right atrium is normal in size. Aortic Valve: The aortic valve is trileaflet. There is no evidence of aortic valve regurgitation. The peak instantaneous gradient of the aortic valve is 5 mmHg. Mitral Valve: The mitral valve is normal in structure. There is mild mitral valve regurgitation. Tricuspid Valve: The tricuspid valve is structurally normal. There is mild tricuspid regurgitation. Pulmonic Valve: The pulmonic valve is not well visualized. There is physiologic pulmonic valve regurgitation. Pericardium: There is no pericardial effusion noted. Aorta: The aortic root was not well visualized.  CONCLUSIONS:  1. Left ventricular ejection fraction is normal, by visual estimate at 55-60%.  2. Left ventricular diastolic filling is indeterminate.  3. There is normal right ventricular global systolic function.  4. Mildly enlarged right ventricle.  5. The left atrium is mildly dilated. QUANTITATIVE DATA SUMMARY:  2D MEASUREMENTS:          Normal Ranges: LAs:             3.47 cm  (2.7-4.0cm) IVSd:            1.35 cm  (0.6-1.1cm) LVPWd:           1.05 cm  (0.6-1.1cm) LVIDd:           4.19 cm  (3.9-5.9cm) LVIDs:           3.18 cm LV Mass Index:   111 g/m2 LV % FS          24.1 %  LA VOLUME:                    Normal Ranges: LA Vol A4C:        38.0 ml    (22+/-6mL/m2) LA Vol A2C:        34.2 ml LA Vol BP:         38.9 ml LA Vol Index A4C:  23.8ml/m2 LA Vol Index A2C:  21.4 ml/m2 LA Vol Index BP:   24.3 ml/m2 LA Area A4C:       14.8 cm2 LA Area A2C:       13.0 cm2 LA Major Joy  A4C: 4.9 cm LA Major Axis A2C: 4.2 cm LA Volume Index:   24.5 ml/m2 LA Vol A4C:        33.1 ml LA Vol A2C:        32.1 ml LA Vol Index BSA:  20.4 ml/m2  RA VOLUME BY A/L METHOD:            Normal Ranges: RA Vol A4C:              51.8 ml    (8.3-19.5ml) RA Vol Index A4C:        32.4 ml/m2 RA Area A4C:             17.1 cm2 RA Major Axis A4C:       4.8 cm  AORTA MEASUREMENTS:         Normal Ranges: Ao Sinus, d:        2.60 cm (2.1-3.5cm) Asc Ao, d:          2.60 cm (2.1-3.4cm)  LV SYSTOLIC FUNCTION BY 2D PLANIMETRY (MOD):                      Normal Ranges: EF-A4C View:    61 % (>=55%) EF-Visual:      58 % LV EF Reported: 58 %  LV DIASTOLIC FUNCTION:             Normal Ranges: MV Peak E:             0.70 m/s    (0.7-1.2 m/s) MV Peak A:             0.63 m/s    (0.42-0.7 m/s) E/A Ratio:             1.12        (1.0-2.2) MV e'                  0.080 m/s   (>8.0) MV lateral e'          0.08 m/s MV medial e'           0.08 m/s MV A Dur:              117.65 msec E/e' Ratio:            8.78        (<8.0) PulmV Sys [place]:         43.93 cm/s PulmV Francisco [place]:        42.42 cm/s PulmV S/D [place]:         1.04 PulmV A Revs [place]:      26.39 cm/s PulmV A Revs Dur:      113.03 msec  MITRAL VALVE:          Normal Ranges: MV DT:        102 msec (150-240msec)  AORTIC VALVE:           Normal Ranges: AoV Vmax:      1.06 m/s (<=1.7m/s) AoV Peak P.5 mmHg (<20mmHg) LVOT Max [place]:  0.60 m/s (<=1.1m/s) LVOT VTI:      11.95 cm LVOT Diameter: 2.07 cm  (1.8-2.4cm) AoV Area,Vmax: 1.91 cm2 (2.5-4.5cm2)  RIGHT VENTRICLE: RV Basal 4.00 cm RV Mid   2.70 cm RV Major 6.4 cm TAPSE:   14.0 mm RV s'    0.11 m/s  TRICUSPID VALVE/RVSP:          Normal Ranges: Peak TR Velocity:     3.26 m/s Est. RA Pressure:     15 mmHg RV Syst Pressure:     57 mmHg  (< 30mmHg) IVC Diam:             2.50 cm  PULMONIC VALVE:          Normal Ranges: PV Max [place]:     0.7 m/s  (0.6-0.9m/s) PV Max P.0 mmHg  Pulmonary Veins: PulmV A Revs Dur: 113.03 msec PulmV A Revs [place]:  26.39 cm/s PulmV Francisco [place]:   42.42 cm/s PulmV S/D [place]:    1.04 PulmV Sys [place]:    43.93 cm/s  AORTA: Asc Ao Diam 2.60 cm  [id number] Hussein Obrien MD Electronically signed on 12/19/2024 at 9:54:33 PM  ** Final **     ECG 12 lead    Result Date: 12/19/2024  Normal sinus rhythm with sinus arrhythmia Normal ECG When compared with ECG of 18-DEC-2024 13:45, Sinus rhythm has replaced Atrial fibrillation See ED provider note for full interpretation and clinical correlation Confirmed by Shaneka Stahl ([id number]) on 12/19/2024 4:20:32 PM    ECG 12 lead    Result Date: 12/19/2024  Normal sinus rhythm Normal ECG When compared with ECG of 18-DEC-2024 15:15, (unconfirmed) No significant change was found    XR chest 1 view    Result Date: 12/19/2024  Interpreted By:  Sebastian Valero, STUDY: XR CHEST 1 VIEW; ;  12/19/2024 3:21 am   INDICATION: Signs/Symptoms:cough lethargy.   COMPARISON: 12/18/2024   ACCESSION NUMBER(S): [id number]   ORDERING CLINICIAN: REZA SHEN   TECHNIQUE: A portable radiograph of the chest is performed.   FINDINGS: Old fracture/deformity of the distal left clavicle is again identified.   The heart is enlarged. The pulmonary vessels are within normal limits. There is no airspace consolidation, pleural effusion, or pneumothorax.       Persistent cardiomegaly. There is otherwise no sign of acute cardiopulmonary disease. If the patient's symptoms persist, continued follow-up is recommended.     MACRO: None   Signed by: Sebastian Valero 12/19/2024 7:26 AM Dictation workstation:   [id number]    CT head wo IV contrast    Result Date: 12/19/2024  Interpreted By:  Jaylan Briggs, STUDY: CT HEAD WO IV CONTRAST;  12/19/2024 3:21 am   INDICATION: Signs/Symptoms:change in mental status r/o stroke.     COMPARISON: None.   ACCESSION NUMBER(S): [id number]   ORDERING CLINICIAN: REZA SHEN   TECHNIQUE: Noncontrast axial CT scan of head was performed. Angled reformats in brain and bone windows were  generated. The images were reviewed in bone, brain, blood and soft tissue windows.   FINDINGS: CSF Spaces: The ventricles, sulci and basal cisterns are within normal limits. There is no extraaxial fluid collection.   Parenchyma: Advanced volume loss.  There is periventricular and subcortical white matter hypoattenuation, most in keeping with chronic microvascular ischemic change. The grey-white differentiation is intact. There is no mass effect or midline shift.  There is no intracranial hemorrhage.   Calvarium: The calvarium is unremarkable.   Paranasal sinuses and mastoids: Visualized paranasal sinuses and mastoids are clear.       No evidence of acute cortical infarct or intracranial hemorrhage.     MACRO: None   Signed by: Jaylan Briggs 12/19/2024 5:44 AM Dictation workstation:   [id number]    ECG 12 lead    Result Date: 12/18/2024  Atrial flutter with variable AV block Nonspecific T wave abnormality Abnormal ECG When compared with ECG of 11-SEP-2024 10:17, Atrial flutter has replaced Sinus rhythm See ED provider note for full interpretation and clinical correlation Confirmed by Shaneka Stahl ([id number]) on 12/18/2024 9:19:23 PM    ECG 12 lead    Result Date: 12/18/2024  Atrial fibrillation Low voltage QRS Abnormal ECG When compared with ECG of 18-DEC-2024 13:11, (unconfirmed) Atrial fibrillation has replaced Atrial flutter Nonspecific T wave abnormality, improved in Inferior leads See ED provider note for full interpretation and clinical correlation Confirmed by Shaneka Stahl ([id number]) on 12/18/2024 6:51:35 PM    XR chest 1 view    Result Date: 12/18/2024  Interpreted By:  Junior Jane, STUDY: XR CHEST 1 VIEW;  12/18/2024 1:25 pm   INDICATION: Signs/Symptoms:Chest Pain.   COMPARISON: None.   ACCESSION NUMBER(S): [id number]   ORDERING CLINICIAN: SRI VILLARREAL   FINDINGS: The lungs are clear without apparent pleural effusion. Possible cardiomegaly. Aortic atherosclerosis. No pulmonary vascular  congestion. Thoracic degenerative changes with dextroscoliosis.       No active disease in the chest identified.   MACRO: None   Signed by: Junior Jane 12/18/2024 1:54 PM Dictation workstation:   [id number]       Assessment/Plan      Assessment & Plan  Paroxysmal atrial fibrillation (Multi)    Benign essential HTN    Insomnia    Age-related osteoporosis without current pathological fracture    Acute-on-chronic kidney injury (CMS-HCC)    Atrial fibrillation, new onset (Multi)  Acute Ischemic Stroke (Left parietotemporal infarct)   Paroxysmal atrial fibrillation, new onset  Essential HTN  - 12/20 MRI brain w/o contrast  - Acute infarct involving the left parietal and temporal lobes.   - neuro tele recs appreciated - check lipid panel in the am and start statin for LDL > 70   - son states pt lack of coordination and right sided weakness significantly worse as well as clarity of speech   -   - serial trop 8 > 8  - EKG reviewed, atrial fibrillation with   - given metoprolol tartrate in the ED  - converted to NSR in the ED  - continue metoprolol tartrate 25 mg BID, amlodipine 10 mg daily  - started apixaban 2.5 mg BID - (lower dose given fall risk and discussion with the son)   - hold losartan d/t renal function  - cardiac monitoring via telemetry  - cardiology consult  - echocardiogram - nl EF and ? Diastolic      Acute on chronic Kidney injury, stage 3a  - baseline creatine 1.2  - creatine on admission 1.62  - given 500 mL bolus of IVF in the Ed  - hold losartan  - avoid nephrotoxic medication  - monitor renal function     Osteoporosis  - continue raloxifene 60 mg daily     Insomnia  - continue trazodone 50 mg daily     PUD ppx  - started pantoprazole 40 mg daily     DVT ppx  - started apixaban 2.5 mg BID     Code status: DNR CMO - changed 12/20 after conversation with the son  He wants to keep supporting her care without any extraordinary measures/treatments - he is agreeable to placement - states he  would not have anything invasive done and he verbalizes understanding of cardiac and neuro status currently with new Atrial dysrhythmia and Acute CVA plus dementia - he prefers lower dose eliquis and will support ongoing medication therapy as long as pt tolerates                  Meron Pizano, GEMMA-CNP

## 2024-12-21 NOTE — PROGRESS NOTES
Agatha Saucedo is a 89 y.o. female on day 1 of admission presenting with Paroxysmal atrial fibrillation (Multi).      Subjective   Agatha is seen in her room in no acute distress.   Agatha is a bit anxious and unsure what is going on.   She has expressive aphasia. Strength equal X 4.   Medically stable for discharge.   Awaiting pre-cert to [facility].       Objective     Last Recorded Vitals  /59 (BP Location: Left arm, Patient Position: Sitting)   Pulse 77   Temp 36.6 °C (97.9 °F) (Temporal)   Resp 16   Wt 63.2 kg (139 lb 6.4 oz)   SpO2 95%   Intake/Output last 3 Shifts:    Intake/Output Summary (Last 24 hours) at 12/21/2024 1233  Last data filed at 12/20/2024 1700  Gross per 24 hour   Intake 480 ml   Output --   Net 480 ml       Admission Weight  Weight: 63.2 kg (139 lb 6.4 oz) (12/18/24 1315)    Daily Weight  12/18/24 : 63.2 kg (139 lb 6.4 oz)    Image Results  MR brain wo IV contrast  Narrative: Interpreted By:  Melody Sloan,   STUDY:  MR BRAIN WO IV CONTRAST;  12/20/2024 10:36 am      INDICATION:  Signs/Symptoms:confusion, leaning to the left.          COMPARISON:  None.      ACCESSION NUMBER(S):  [id number]      ORDERING CLINICIAN:  JOSE A PRATT      TECHNIQUE:  Axial T2, FLAIR, DWI, gradient echo T2 and sagittal and coronal T1  weighted images of brain were acquired.      FINDINGS:  CSF Spaces: The ventricles, sulci and basal cisterns are prominent  compatible with age related involutional changes and moderate volume  loss. There is no extra-axial fluid collection.      Parenchyma: There is cortical and subcortical diffusion restriction  within the left temporal and parietal lobes compatible with an acute  infarct. There are few punctate foci of cortical diffusion  restriction within the left parietal lobe compatible with additional  small cortical infarcts. There is associated T2 and FLAIR signal  abnormality. There is no evidence of hemorrhage. There is mild local  mass effect with  effacement of the sulci. No midline shift. There is  no focal parenchymal signal abnormality.  There is no mass effect or  midline shift.      Paranasal Sinuses and Mastoids: Visualized paranasal sinuses and  mastoid air cells are predominantly clear.      Impression: Acute infarct involving the left parietal and temporal lobes. There  is associated T2 and FLAIR signal abnormality with mild local mass  effect. No midline shift. No associated hemorrhage.      MACRO:  None      Signed by: Melody Sloan 12/20/2024 10:42 AM  Dictation workstation:   [id number]  MR angio head wo IV contrast  Narrative: Interpreted By:  Melody Sloan,   STUDY:  MR ANGIO HEAD WO IV CONTRAST;  12/20/2024 10:25 am      INDICATION:  Signs/Symptoms:dizziness.          COMPARISON:  None.      ACCESSION NUMBER(S):  [id number]      ORDERING CLINICIAN:  JOSE A PRATT      TECHNIQUE:  Time-of-flight MRA of the head was performed. The images were  reviewed as source images and maximum intensity projections.      FINDINGS:  The examination is moderately degraded by patient motion artifact.      Anterior circulation:    Grossly there is expected flow signal in  bilateral intracranial internal carotid arteries, bilateral carotid  terminals, bilateral proximal anterior and middle cerebral arteries.      Posterior circulation:    Grossly the bilateral intracranial  vertebral arteries, vertebrobasilar junction, basilar artery and  proximal posterior cerebral arteries demonstrate expected flow  signal. The right posterior cerebral artery is predominantly supplied  by robust posterior communicating artery.      Impression: The examination is moderately degraded by patient motion artifact.  1. Grossly there is no evidence for hemodynamically significant  stenosis or large branch vessel cutoffs of the visualized  intracranial vasculature.      MACRO:  None      Signed by: Melody Sloan 12/20/2024 10:31 AM  Dictation workstation:   [id number]  MR angio neck  wo IV contrast  Narrative: Interpreted By:  Melody Sloan,   STUDY:  MR ANGIO NECK WO IV CONTRAST;  12/20/2024 10:20 am      INDICATION:  Signs/Symptoms:dizziness.          COMPARISON:  None.      ACCESSION NUMBER(S):  [id number]      ORDERING CLINICIAN:  JOSE A PRATT      TECHNIQUE:  Time of flight MRA of the neck was performed. The images were  reviewed as source images and maximum intensity projections.      FINDINGS:  The source images are mildly degraded by artifact.      Right carotid vessels:  There is expected flow signal in the  visualized portion of the common carotid artery.  There is mild  attenuation of flow signal at the carotid bifurcation which may be  secondary to flow related artifact. The internal carotid artery in  the neck demonstrates expected flow signal.      Left carotid vessels:   There is expected flow signal in the  visualized portion of the common carotid artery.  There is mild  attenuation of flow signal at the carotid bifurcation which may be  secondary to flow related artifact. The internal carotid artery in  the neck demonstrates expected flow signal.      Vertebral vessels:   The visualized segments of the cervical  vertebral arteries demonstrate expected flow signal.      Impression: No evidence of significant stenosis on MRA of the neck.      MACRO:  None      Signed by: Melody Sloan 12/20/2024 10:27 AM  Dictation workstation:   [id number]    Results for orders placed or performed during the hospital encounter of 12/18/24 (from the past 24 hours)   Lipid Panel Non-Fasting   Result Value Ref Range    Cholesterol 192 0 - 199 mg/dL    HDL-Cholesterol 49.3 mg/dL    Cholesterol/HDL Ratio 3.9     Non-HDL Cholesterol 143 0 - 149 mg/dL   Lavender Top   Result Value Ref Range    Extra Tube Hold for add-ons.          Physical Exam  Constitutional:       Appearance: She is not ill-appearing.   HENT:      Head: Atraumatic.      Nose: Nose normal.   Eyes:      Pupils: Pupils are equal,  round, and reactive to light.   Cardiovascular:      Rate and Rhythm: Normal rate.      Pulses: Normal pulses.   Pulmonary:      Effort: Pulmonary effort is normal.      Breath sounds: Normal breath sounds.   Abdominal:      General: Bowel sounds are normal.   Musculoskeletal:         General: Normal range of motion.   Skin:     General: Skin is warm.      Findings: Bruising present.   Neurological:      Mental Status: She is alert.   Psychiatric:         Attention and Perception: She is inattentive.         Mood and Affect: Mood is anxious.         Speech: Speech is slurred.         Cognition and Memory: Memory is impaired.       Scheduled medications  amLODIPine, 10 mg, oral, Daily  apixaban, 2.5 mg, oral, q12h  metoprolol tartrate, 25 mg, oral, BID  pantoprazole, 40 mg, oral, Daily before breakfast  QUEtiapine, 25 mg, oral, Nightly  raloxifene, 60 mg, oral, Daily  traZODone, 50 mg, oral, Nightly      Continuous medications     PRN medications  PRN medications: acetaminophen, LORazepam, metoprolol, ondansetron, polyethylene glycol    Assessment/Plan      Acute Ischemic Stroke (Left parietotemporal infarct)   Paroxysmal atrial fibrillation, new onset  Essential HTN  - 12/20 MRI brain w/o contrast  - Acute infarct involving the left parietal and temporal lobes.   - neuro tele recs appreciated - check lipid panel in the am and start statin for LDL > 70   - son states pt lack of coordination and right sided weakness significantly worse as well as clarity of speech   -   - serial trop 8 > 8  - EKG reviewed, atrial fibrillation with   - given metoprolol tartrate in the ED  - converted to NSR in the ED  - continue metoprolol tartrate 25 mg BID, amlodipine 10 mg daily  - started apixaban 2.5 mg BID - (lower dose given fall risk and discussion with the son)   - hold losartan d/t renal function  - cardiac monitoring via telemetry  - cardiology consult  - echocardiogram - nl EF and ? Diastolic   12/21  Equal  strength without neglect~expressive aphasia~slurred speech     Acute on chronic Kidney injury, stage 3a  - baseline creatine 1.2  - creatine on admission 1.62  - given 500 mL bolus of IVF in the Ed  - hold losartan  - avoid nephrotoxic medication  - monitor renal function     Osteoporosis  - continue raloxifene 60 mg daily     Insomnia  - continue trazodone 50 mg daily     PUD ppx  - started pantoprazole 40 mg daily     DVT ppx  - started apixaban 2.5 mg BID    Disposition: Admitted and positive for CVA. Medically ready for discharge and await pre-cert for [facility].     Cinthia Mead, APRN-CNP

## 2024-12-22 VITALS
HEIGHT: 60 IN | RESPIRATION RATE: 16 BRPM | DIASTOLIC BLOOD PRESSURE: 58 MMHG | TEMPERATURE: 97.7 F | WEIGHT: 139.4 LBS | SYSTOLIC BLOOD PRESSURE: 115 MMHG | BODY MASS INDEX: 27.37 KG/M2 | HEART RATE: 81 BPM | OXYGEN SATURATION: 97 %

## 2024-12-22 PROCEDURE — 1100000001 HC PRIVATE ROOM DAILY: Mod: IPSPLIT

## 2024-12-22 PROCEDURE — 2500000001 HC RX 250 WO HCPCS SELF ADMINISTERED DRUGS (ALT 637 FOR MEDICARE OP): Mod: IPSPLIT | Performed by: NURSE PRACTITIONER

## 2024-12-22 PROCEDURE — 99231 SBSQ HOSP IP/OBS SF/LOW 25: CPT | Performed by: NURSE PRACTITIONER

## 2024-12-22 PROCEDURE — 2500000002 HC RX 250 W HCPCS SELF ADMINISTERED DRUGS (ALT 637 FOR MEDICARE OP, ALT 636 FOR OP/ED): Mod: IPSPLIT | Performed by: NURSE PRACTITIONER

## 2024-12-22 PROCEDURE — 94760 N-INVAS EAR/PLS OXIMETRY 1: CPT | Mod: IPSPLIT

## 2024-12-22 RX ADMIN — RALOXIFENE 60 MG: 60 TABLET ORAL at 09:20

## 2024-12-22 RX ADMIN — AMLODIPINE BESYLATE 10 MG: 5 TABLET ORAL at 09:19

## 2024-12-22 RX ADMIN — METOPROLOL TARTRATE 25 MG: 25 TABLET, FILM COATED ORAL at 20:19

## 2024-12-22 RX ADMIN — QUETIAPINE FUMARATE 25 MG: 25 TABLET ORAL at 20:19

## 2024-12-22 RX ADMIN — METOPROLOL TARTRATE 25 MG: 25 TABLET, FILM COATED ORAL at 09:19

## 2024-12-22 RX ADMIN — LORAZEPAM 0.5 MG: 0.5 TABLET ORAL at 20:19

## 2024-12-22 RX ADMIN — APIXABAN 2.5 MG: 2.5 TABLET, FILM COATED ORAL at 09:19

## 2024-12-22 RX ADMIN — LORAZEPAM 0.5 MG: 0.5 TABLET ORAL at 09:19

## 2024-12-22 RX ADMIN — APIXABAN 2.5 MG: 2.5 TABLET, FILM COATED ORAL at 20:19

## 2024-12-22 RX ADMIN — TRAZODONE HYDROCHLORIDE 50 MG: 50 TABLET ORAL at 20:19

## 2024-12-22 ASSESSMENT — PAIN SCALES - PAIN ASSESSMENT IN ADVANCED DEMENTIA (PAINAD)
CONSOLABILITY: NO NEED TO CONSOLE
BREATHING: NORMAL
BODYLANGUAGE: RELAXED
FACIALEXPRESSION: SMILING OR INEXPRESSIVE
TOTALSCORE: 0

## 2024-12-22 ASSESSMENT — PAIN SCALES - GENERAL: PAINLEVEL_OUTOF10: 0 - NO PAIN

## 2024-12-22 NOTE — PROGRESS NOTES
Agatha Saucedo is a 89 y.o. female on day 2 of admission presenting with Paroxysmal atrial fibrillation (Multi).      Subjective   Patient seen in her room. She is anxious and continues to not understand her situation. She is asking for her son.  Nursing reassured her multiple times.   Continues to be medically stable and awaiting pre-cert for discharge to nursing home.        Objective     Last Recorded Vitals  /74 (BP Location: Left arm, Patient Position: Lying)   Pulse 81   Temp 36.2 °C (97.2 °F) (Temporal)   Resp 16   Wt 63.2 kg (139 lb 6.4 oz)   SpO2 95%   Intake/Output last 3 Shifts:    Intake/Output Summary (Last 24 hours) at 12/22/2024 0929  Last data filed at 12/21/2024 1800  Gross per 24 hour   Intake 240 ml   Output --   Net 240 ml       Admission Weight  Weight: 63.2 kg (139 lb 6.4 oz) (12/18/24 1315)    Daily Weight  12/18/24 : 63.2 kg (139 lb 6.4 oz)    Image Results  MR brain wo IV contrast  Narrative: Interpreted By:  Melody Sloan,   STUDY:  MR BRAIN WO IV CONTRAST;  12/20/2024 10:36 am      INDICATION:  Signs/Symptoms:confusion, leaning to the left.          COMPARISON:  None.      ACCESSION NUMBER(S):  [id number]      ORDERING CLINICIAN:  JOSE A PRATT      TECHNIQUE:  Axial T2, FLAIR, DWI, gradient echo T2 and sagittal and coronal T1  weighted images of brain were acquired.      FINDINGS:  CSF Spaces: The ventricles, sulci and basal cisterns are prominent  compatible with age related involutional changes and moderate volume  loss. There is no extra-axial fluid collection.      Parenchyma: There is cortical and subcortical diffusion restriction  within the left temporal and parietal lobes compatible with an acute  infarct. There are few punctate foci of cortical diffusion  restriction within the left parietal lobe compatible with additional  small cortical infarcts. There is associated T2 and FLAIR signal  abnormality. There is no evidence of hemorrhage. There is mild local  mass  effect with effacement of the sulci. No midline shift. There is  no focal parenchymal signal abnormality.  There is no mass effect or  midline shift.      Paranasal Sinuses and Mastoids: Visualized paranasal sinuses and  mastoid air cells are predominantly clear.      Impression: Acute infarct involving the left parietal and temporal lobes. There  is associated T2 and FLAIR signal abnormality with mild local mass  effect. No midline shift. No associated hemorrhage.      MACRO:  None      Signed by: Melody Sloan 12/20/2024 10:42 AM  Dictation workstation:   [id number]  MR angio head wo IV contrast  Narrative: Interpreted By:  Melody Sloan,   STUDY:  MR ANGIO HEAD WO IV CONTRAST;  12/20/2024 10:25 am      INDICATION:  Signs/Symptoms:dizziness.          COMPARISON:  None.      ACCESSION NUMBER(S):  [id number]      ORDERING CLINICIAN:  JOSE A PRATT      TECHNIQUE:  Time-of-flight MRA of the head was performed. The images were  reviewed as source images and maximum intensity projections.      FINDINGS:  The examination is moderately degraded by patient motion artifact.      Anterior circulation:    Grossly there is expected flow signal in  bilateral intracranial internal carotid arteries, bilateral carotid  terminals, bilateral proximal anterior and middle cerebral arteries.      Posterior circulation:    Grossly the bilateral intracranial  vertebral arteries, vertebrobasilar junction, basilar artery and  proximal posterior cerebral arteries demonstrate expected flow  signal. The right posterior cerebral artery is predominantly supplied  by robust posterior communicating artery.      Impression: The examination is moderately degraded by patient motion artifact.  1. Grossly there is no evidence for hemodynamically significant  stenosis or large branch vessel cutoffs of the visualized  intracranial vasculature.      MACRO:  None      Signed by: Melody Sloan 12/20/2024 10:31 AM  Dictation workstation:   [id number]  MR  angio neck wo IV contrast  Narrative: Interpreted By:  Melody Sloan,   STUDY:  MR ANGIO NECK WO IV CONTRAST;  12/20/2024 10:20 am      INDICATION:  Signs/Symptoms:dizziness.          COMPARISON:  None.      ACCESSION NUMBER(S):  [id number]      ORDERING CLINICIAN:  JOSE A PRATT      TECHNIQUE:  Time of flight MRA of the neck was performed. The images were  reviewed as source images and maximum intensity projections.      FINDINGS:  The source images are mildly degraded by artifact.      Right carotid vessels:  There is expected flow signal in the  visualized portion of the common carotid artery.  There is mild  attenuation of flow signal at the carotid bifurcation which may be  secondary to flow related artifact. The internal carotid artery in  the neck demonstrates expected flow signal.      Left carotid vessels:   There is expected flow signal in the  visualized portion of the common carotid artery.  There is mild  attenuation of flow signal at the carotid bifurcation which may be  secondary to flow related artifact. The internal carotid artery in  the neck demonstrates expected flow signal.      Vertebral vessels:   The visualized segments of the cervical  vertebral arteries demonstrate expected flow signal.      Impression: No evidence of significant stenosis on MRA of the neck.      MACRO:  None      Signed by: Melody Sloan 12/20/2024 10:27 AM  Dictation workstation:   [id number]    No results found for this or any previous visit (from the past 24 hours).    Physical Exam  Constitutional:       Appearance: She is not ill-appearing.   HENT:      Head: Atraumatic.      Nose: Nose normal.   Eyes:      Pupils: Pupils are equal, round, and reactive to light.   Cardiovascular:      Rate and Rhythm: Normal rate.      Pulses: Normal pulses.   Pulmonary:      Effort: Pulmonary effort is normal.      Breath sounds: Normal breath sounds.   Abdominal:      General: Bowel sounds are normal.   Musculoskeletal:          General: Normal range of motion.   Skin:     General: Skin is warm.      Findings: Bruising present.   Neurological:      Mental Status: She is alert.   Psychiatric:         Attention and Perception: She is inattentive.         Mood and Affect: Mood is anxious.         Speech: Speech is clear today.         Cognition and Memory: Memory is impaired.     Scheduled medications  amLODIPine, 10 mg, oral, Daily  apixaban, 2.5 mg, oral, q12h  metoprolol tartrate, 25 mg, oral, BID  pantoprazole, 40 mg, oral, Daily before breakfast  QUEtiapine, 25 mg, oral, Nightly  raloxifene, 60 mg, oral, Daily  traZODone, 50 mg, oral, Nightly      Continuous medications     PRN medications  PRN medications: acetaminophen, LORazepam, metoprolol, ondansetron, polyethylene glycol      Assessment/Plan      Acute Ischemic Stroke (Left parietotemporal infarct)   Paroxysmal atrial fibrillation, new onset  Essential HTN  - 12/20 MRI brain w/o contrast  - Acute infarct involving the left parietal and temporal lobes.   - neuro tele recs appreciated - check lipid panel in the am and start statin for LDL > 70   - son states pt lack of coordination and right sided weakness significantly worse as well as clarity of speech   -   - serial trop 8 > 8  - EKG reviewed, atrial fibrillation with   - given metoprolol tartrate in the ED  - converted to NSR in the ED  - continue metoprolol tartrate 25 mg BID, amlodipine 10 mg daily  - started apixaban 2.5 mg BID - (lower dose given fall risk and discussion with the son)   - hold losartan d/t renal function  - cardiac monitoring via telemetry  - cardiology consult  - echocardiogram - nl EF and ? Diastolic   12/21  Equal strength without neglect~expressive aphasia~slurred speech     Acute on chronic Kidney injury, stage 3a  - baseline creatine 1.2  - creatine on admission 1.62  - given 500 mL bolus of IVF in the Ed  - hold losartan  - avoid nephrotoxic medication  - monitor renal function      Osteoporosis  - continue raloxifene 60 mg daily     Insomnia  - continue trazodone 50 mg daily     PUD ppx  - started pantoprazole 40 mg daily     DVT ppx  - started apixaban 2.5 mg BID     Disposition: Admitted and positive for CVA. Medically ready for discharge and await pre-cert for [facility].     Cinthia Mead, APRN-CNP

## 2024-12-22 NOTE — CARE PLAN
The patient's goals for the shift include      The clinical goals for the shift include patient will wait for assistance before getting out of bed    Patient rested majority of shift. Denies pain at this time. Call light within reach. Will continue with poc.

## 2024-12-22 NOTE — NURSING NOTE
Patient was confused throughout the day. No signs of pain. Son Meño called to check on her. SNF to have face to face with her tomorrow.

## 2024-12-23 PROCEDURE — 99231 SBSQ HOSP IP/OBS SF/LOW 25: CPT | Performed by: NURSE PRACTITIONER

## 2024-12-23 PROCEDURE — 1100000001 HC PRIVATE ROOM DAILY: Mod: IPSPLIT

## 2024-12-23 PROCEDURE — 97530 THERAPEUTIC ACTIVITIES: CPT | Mod: GP,IPSPLIT

## 2024-12-23 PROCEDURE — 97116 GAIT TRAINING THERAPY: CPT | Mod: GP,IPSPLIT

## 2024-12-23 PROCEDURE — 97535 SELF CARE MNGMENT TRAINING: CPT | Mod: GO,IPSPLIT

## 2024-12-23 PROCEDURE — 94760 N-INVAS EAR/PLS OXIMETRY 1: CPT | Mod: IPSPLIT

## 2024-12-23 PROCEDURE — 2500000002 HC RX 250 W HCPCS SELF ADMINISTERED DRUGS (ALT 637 FOR MEDICARE OP, ALT 636 FOR OP/ED): Mod: IPSPLIT | Performed by: NURSE PRACTITIONER

## 2024-12-23 PROCEDURE — 2500000001 HC RX 250 WO HCPCS SELF ADMINISTERED DRUGS (ALT 637 FOR MEDICARE OP): Mod: IPSPLIT | Performed by: NURSE PRACTITIONER

## 2024-12-23 RX ADMIN — APIXABAN 2.5 MG: 2.5 TABLET, FILM COATED ORAL at 20:21

## 2024-12-23 RX ADMIN — AMLODIPINE BESYLATE 10 MG: 5 TABLET ORAL at 09:26

## 2024-12-23 RX ADMIN — LORAZEPAM 0.5 MG: 0.5 TABLET ORAL at 20:21

## 2024-12-23 RX ADMIN — RALOXIFENE 60 MG: 60 TABLET ORAL at 09:26

## 2024-12-23 RX ADMIN — METOPROLOL TARTRATE 25 MG: 25 TABLET, FILM COATED ORAL at 20:21

## 2024-12-23 RX ADMIN — APIXABAN 2.5 MG: 2.5 TABLET, FILM COATED ORAL at 09:27

## 2024-12-23 RX ADMIN — QUETIAPINE FUMARATE 25 MG: 25 TABLET ORAL at 20:21

## 2024-12-23 RX ADMIN — TRAZODONE HYDROCHLORIDE 50 MG: 50 TABLET ORAL at 20:21

## 2024-12-23 RX ADMIN — METOPROLOL TARTRATE 25 MG: 25 TABLET, FILM COATED ORAL at 09:27

## 2024-12-23 ASSESSMENT — COGNITIVE AND FUNCTIONAL STATUS - GENERAL
MOVING TO AND FROM BED TO CHAIR: A LITTLE
CLIMB 3 TO 5 STEPS WITH RAILING: A LOT
MOVING FROM LYING ON BACK TO SITTING ON SIDE OF FLAT BED WITH BEDRAILS: A LITTLE
TOILETING: A LITTLE
WALKING IN HOSPITAL ROOM: A LITTLE
DRESSING REGULAR UPPER BODY CLOTHING: A LITTLE
TURNING FROM BACK TO SIDE WHILE IN FLAT BAD: A LITTLE
MOBILITY SCORE: 17
DAILY ACTIVITIY SCORE: 18
HELP NEEDED FOR BATHING: A LITTLE
PERSONAL GROOMING: A LITTLE
EATING MEALS: A LITTLE
DRESSING REGULAR LOWER BODY CLOTHING: A LITTLE
STANDING UP FROM CHAIR USING ARMS: A LITTLE

## 2024-12-23 ASSESSMENT — PAIN SCALES - GENERAL
PAINLEVEL_OUTOF10: 0 - NO PAIN

## 2024-12-23 ASSESSMENT — PAIN - FUNCTIONAL ASSESSMENT
PAIN_FUNCTIONAL_ASSESSMENT: 0-10
PAIN_FUNCTIONAL_ASSESSMENT: 0-10

## 2024-12-23 ASSESSMENT — ACTIVITIES OF DAILY LIVING (ADL): HOME_MANAGEMENT_TIME_ENTRY: 17

## 2024-12-23 NOTE — PROGRESS NOTES
Physical Therapy    Physical Therapy Treatment    Patient Name: Agatha Saucedo  MRN: [id number]  Department: [facility]  Room: [address] [place] [address]  Today's Date: 12/23/2024  Time Calculation  Start Time: 1331  Stop Time: 1358  Time Calculation (min): 27 min    Assessment/Plan   PT Assessment  End of Session Communication: Bedside nurse, Care Coordinator  Assessment Comment: Patient mobilizing better today still requiring treatment to improve ambulatory safety and balance. Rec: speech therapy consult.     PT Plan  Treatment/Interventions: Bed mobility, Transfer training, Gait training, Stair training, Balance training, Neuromuscular re-education, Therapeutic exercise, Therapeutic activity, Strengthening, Endurance training, Home exercise program, Positioning, Postural re-education  PT Plan: Ongoing PT  PT Frequency: 4 times per week  PT Discharge Recommendations: High intensity level of continued care (Discussion with supervising  PT/OT this afternoon- PT  recommending High intensity pending continued progress of Cognitive impairment as OT noting increased improvement compared to yesterday session and AM session today;)  Equipment Recommended upon Discharge:  (TBD)  PT Recommended Transfer Status: Assist x2  PT - OK to Discharge: Yes    General Visit Information:   PT  Visit  PT Received On: 12/23/24  General  Prior to Session Communication: Bedside nurse  Patient Position Received: Bed, 2 rail up, Alarm on  General Comment: Receptive to PT but limited communication 2/2 expressive aphasia/word finding difficulty.    Subjective   Precautions:  Precautions  Medical Precautions: Fall precautions, Neuro precautions    Objective   Pain:  Pain Assessment  Pain Assessment: 0-10  0-10 (Numeric) Pain Score: 0 - No pain    Cognition:  Cognition  Overall Cognitive Status: Unable to assess (Appears to understand commands but aphasia limiting communication.)    Coordination:  Movements are Fluid and Coordinated: No  Coordination Comment: Mod  fine motor deficit R hand/UE    Postural Control:  Static Sitting Balance  Static Sitting-Level of Assistance: Modified independent  Dynamic Sitting Balance  Dynamic Sitting-Level of Assistance: Close supervision  Static Standing Balance  Static Standing-Level of Assistance: Contact guard  Dynamic Standing Balance  Dynamic Standing-Level of Assistance: Minimum assistance    Treatments:   Therapeutic Activity  Therapeutic Activity Performed: Yes  Therapeutic Activity 1: Bed mobility  Therapeutic Activity 2: transfers from bed    Bed Mobility  Bed Mobility: Yes  Bed Mobility 1  Bed Mobility 1: Supine to sitting, Sitting to supine  Level of Assistance 1: Close supervision, Minimal verbal cues    Ambulation/Gait Training  Ambulation/Gait Training Performed: Yes  Ambulation/Gait Training 1  Device 1: No device  Assistance 1: Minimum assistance, Hand held assistance  Quality of Gait 1: Narrow base of support, Diminished heel strike, Inconsistent stride length  Comments/Distance (ft) 1: 1x40, 1x80 ft with noted balance deficits (list ing to R)  Transfer 1  Technique 1: Sit to stand, Stand to sit  Transfer Level of Assistance 1: Close supervision, Contact guard, Minimal tactile cues, Minimal verbal cues    Outcome Measures:  [facility] Basic Mobility  Turning from your back to your side while in a flat bed without using bedrails: A little  Moving from lying on your back to sitting on the side of a flat bed without using bedrails: A little  Moving to and from bed to chair (including a wheelchair): A little  Standing up from a chair using your arms (e.g. wheelchair or bedside chair): A little  To walk in hospital room: A little  Climbing 3-5 steps with railing: A lot  Basic Mobility - Total Score: 17    Education Documentation  No documentation found.  Education Comments  No comments found.        OP EDUCATION:       Encounter Problems       Encounter Problems (Active)       PT Problem       pt will be able to ambulate 10 ft with  FWW and minimal assist   (Progressing)       Start:  12/19/24    Expected End:  01/02/25            Pt will be able to perform STS transfer with supervision  (Progressing)       Start:  12/19/24    Expected End:  01/02/25            Pt will be able to perform all aspects of bed mobility with supervision  (Progressing)       Start:  12/19/24    Expected End:  01/02/25            Pt will demonstrate ability to  object from ground from sitting position, without a device and w supervision (Progressing)       Start:  12/19/24    Expected End:  01/02/25            Pt will demo ability to stand for 1 min w BUE support w proper posturing and no excessive lean (Progressing)       Start:  12/19/24    Expected End:  01/02/25               Pain - Adult

## 2024-12-23 NOTE — PROGRESS NOTES
"Agatha Saucedo is a 89 y.o. female on day 3 of admission presenting with Paroxysmal atrial fibrillation (Multi).      Subjective   Agatha is seen in her room in no acute distress.   She is less anxious today and expressed that \"I am not stupid\"  She continues with expressive aphasia.  Awaiting placement.        Objective     Last Recorded Vitals  /68 (BP Location: Left arm, Patient Position: Sitting)   Pulse 58   Temp 36.2 °C (97.2 °F) (Temporal)   Resp 16   Wt 63.2 kg (139 lb 6.4 oz)   SpO2 96%   Intake/Output last 3 Shifts:    Intake/Output Summary (Last 24 hours) at 12/23/2024 1730  Last data filed at 12/23/2024 1200  Gross per 24 hour   Intake 120 ml   Output --   Net 120 ml       Admission Weight  Weight: 63.2 kg (139 lb 6.4 oz) (12/18/24 1315)    Daily Weight  12/18/24 : 63.2 kg (139 lb 6.4 oz)    Image Results  MR brain wo IV contrast  Narrative: Interpreted By:  Melody Sloan,   STUDY:  MR BRAIN WO IV CONTRAST;  12/20/2024 10:36 am      INDICATION:  Signs/Symptoms:confusion, leaning to the left.          COMPARISON:  None.      ACCESSION NUMBER(S):  [id number]      ORDERING CLINICIAN:  JOSE A PRATT      TECHNIQUE:  Axial T2, FLAIR, DWI, gradient echo T2 and sagittal and coronal T1  weighted images of brain were acquired.      FINDINGS:  CSF Spaces: The ventricles, sulci and basal cisterns are prominent  compatible with age related involutional changes and moderate volume  loss. There is no extra-axial fluid collection.      Parenchyma: There is cortical and subcortical diffusion restriction  within the left temporal and parietal lobes compatible with an acute  infarct. There are few punctate foci of cortical diffusion  restriction within the left parietal lobe compatible with additional  small cortical infarcts. There is associated T2 and FLAIR signal  abnormality. There is no evidence of hemorrhage. There is mild local  mass effect with effacement of the sulci. No midline shift. There " is  no focal parenchymal signal abnormality.  There is no mass effect or  midline shift.      Paranasal Sinuses and Mastoids: Visualized paranasal sinuses and  mastoid air cells are predominantly clear.      Impression: Acute infarct involving the left parietal and temporal lobes. There  is associated T2 and FLAIR signal abnormality with mild local mass  effect. No midline shift. No associated hemorrhage.      MACRO:  None      Signed by: Melody Sloan 12/20/2024 10:42 AM  Dictation workstation:   [id number]  MR angio head wo IV contrast  Narrative: Interpreted By:  Melody Sloan,   STUDY:  MR ANGIO HEAD WO IV CONTRAST;  12/20/2024 10:25 am      INDICATION:  Signs/Symptoms:dizziness.          COMPARISON:  None.      ACCESSION NUMBER(S):  [id number]      ORDERING CLINICIAN:  JOSE A PRATT      TECHNIQUE:  Time-of-flight MRA of the head was performed. The images were  reviewed as source images and maximum intensity projections.      FINDINGS:  The examination is moderately degraded by patient motion artifact.      Anterior circulation:    Grossly there is expected flow signal in  bilateral intracranial internal carotid arteries, bilateral carotid  terminals, bilateral proximal anterior and middle cerebral arteries.      Posterior circulation:    Grossly the bilateral intracranial  vertebral arteries, vertebrobasilar junction, basilar artery and  proximal posterior cerebral arteries demonstrate expected flow  signal. The right posterior cerebral artery is predominantly supplied  by robust posterior communicating artery.      Impression: The examination is moderately degraded by patient motion artifact.  1. Grossly there is no evidence for hemodynamically significant  stenosis or large branch vessel cutoffs of the visualized  intracranial vasculature.      MACRO:  None      Signed by: Melody Sloan 12/20/2024 10:31 AM  Dictation workstation:   [id number]  MR angio neck wo IV contrast  Narrative: Interpreted By:  Nathalia  Melody,   STUDY:  MR ANGIO NECK WO IV CONTRAST;  12/20/2024 10:20 am      INDICATION:  Signs/Symptoms:dizziness.          COMPARISON:  None.      ACCESSION NUMBER(S):  [id number]      ORDERING CLINICIAN:  JOSE A PRATT      TECHNIQUE:  Time of flight MRA of the neck was performed. The images were  reviewed as source images and maximum intensity projections.      FINDINGS:  The source images are mildly degraded by artifact.      Right carotid vessels:  There is expected flow signal in the  visualized portion of the common carotid artery.  There is mild  attenuation of flow signal at the carotid bifurcation which may be  secondary to flow related artifact. The internal carotid artery in  the neck demonstrates expected flow signal.      Left carotid vessels:   There is expected flow signal in the  visualized portion of the common carotid artery.  There is mild  attenuation of flow signal at the carotid bifurcation which may be  secondary to flow related artifact. The internal carotid artery in  the neck demonstrates expected flow signal.      Vertebral vessels:   The visualized segments of the cervical  vertebral arteries demonstrate expected flow signal.      Impression: No evidence of significant stenosis on MRA of the neck.      MACRO:  None      Signed by: Melody Sloan 12/20/2024 10:27 AM  Dictation workstation:   [id number]    No results found for this or any previous visit (from the past 24 hours).    Physical Exam  Constitutional:       Appearance: She is not ill-appearing.   HENT:      Head: Atraumatic.      Nose: Nose normal.   Eyes:      Pupils: Pupils are equal, round, and reactive to light.   Cardiovascular:      Rate and Rhythm: Normal rate.      Pulses: Normal pulses.   Pulmonary:      Effort: Pulmonary effort is normal.      Breath sounds: Normal breath sounds.   Abdominal:      General: Bowel sounds are normal.   Musculoskeletal:         General: Normal range of motion.   Skin:     General: Skin is  warm.      Findings: Bruising present.   Neurological:      Mental Status: She is alert.   Psychiatric:         Attention and Perception: She is inattentive.         Mood and Affect: Mood is improved with intermittent anxiety.          Speech: Speech is slurred with expressive aphasia today.         Cognition and Memory: Memory is impaired.     Scheduled medications  amLODIPine, 10 mg, oral, Daily  apixaban, 2.5 mg, oral, q12h  metoprolol tartrate, 25 mg, oral, BID  pantoprazole, 40 mg, oral, Daily before breakfast  QUEtiapine, 25 mg, oral, Nightly  raloxifene, 60 mg, oral, Daily  traZODone, 50 mg, oral, Nightly      Continuous medications     PRN medications  PRN medications: acetaminophen, LORazepam, metoprolol, ondansetron, polyethylene glycol        Assessment/Plan      Acute Ischemic Stroke (Left parietotemporal infarct)   Paroxysmal atrial fibrillation, new onset  Essential HTN  - 12/20 MRI brain w/o contrast  - Acute infarct involving the left parietal and temporal lobes.   - neuro tele recs appreciated - check lipid panel in the am and start statin for LDL > 70   - son states pt lack of coordination and right sided weakness significantly worse as well as clarity of speech   -   - serial trop 8 > 8  - EKG reviewed, atrial fibrillation with   - given metoprolol tartrate in the ED  - converted to NSR in the ED  - continue metoprolol tartrate 25 mg BID, amlodipine 10 mg daily  - started apixaban 2.5 mg BID - (lower dose given fall risk and discussion with the son)   - hold losartan d/t renal function  - cardiac monitoring via telemetry  - cardiology consult  - echocardiogram - nl EF and ? Diastolic   12/21  Equal strength without neglect~expressive aphasia~slurred speech     Acute on chronic Kidney injury, stage 3a  - baseline creatine 1.2  - creatine on admission 1.62  - given 500 mL bolus of IVF in the Ed  - hold losartan  - avoid nephrotoxic medication  - monitor renal function      Osteoporosis  - continue raloxifene 60 mg daily     Insomnia  - continue trazodone 50 mg daily     PUD ppx  - started pantoprazole 40 mg daily     DVT ppx  - started apixaban 2.5 mg BID     Disposition: Admitted and positive for CVA. Medically ready for discharge and await pre-cert for [facility].     Cinthia Mead, APRN-CNP

## 2024-12-23 NOTE — PROGRESS NOTES
"Physical Therapy                 Therapy Communication Note    Patient Name: Agatha Saucedo  MRN: [id number]  Department: [facility]  Room: [address] [place] [address]  Today's Date: 12/23/2024     Discipline: Physical Therapy    PT Missed Visit: Yes     Missed Visit Reason: Missed Visit Reason: Patient refused    Missed Time: Attempt    Comment: Patient laying in bed upon arrival into room. Refusing to participate in therapy and when asked for a reason states \"I just don't want too.\" Attempting to educate patient on importance of movement and rehab, but only answering with \"I don't care.\"    Bedside Nurse made aware    Attempt Time 7:48-7:50   "

## 2024-12-23 NOTE — PROGRESS NOTES
"Occupational Therapy                 Therapy Communication Note    Patient Name: Agatha Saucedo  MRN: [id number]  Department: [facility]  Room: [address] [place], [place] [address]  Today's Date: 12/23/2024     Discipline: Occupational Therapy    OT Missed Visit: Yes     Missed Visit Reason: Missed Visit Reason: Patient refused    Missed Time: Attempt    Comment: Patient refusing to get out of bed this date reporting, \"I do not want to move\". Educated patient on importance of participation to be able to transition to the next level of care as well as increase independence with ADLs/transfers. Patient continuing to refuse at this time. TCC and nursing aware.     Attempt time: [id number]  "

## 2024-12-23 NOTE — CARE PLAN
The patient's goals for the shift include      The clinical goals for the shift include patient will get adequate sleep this shift    Patient had uneventful shift. Slept majority of shift. Attempted to get out bed once and was redirected. Call light within reach. Bed alarm on. Will continue with poc.

## 2024-12-23 NOTE — PROGRESS NOTES
"Occupational Therapy    Occupational Therapy Treatment    Name: Agatha Saucedo  MRN: [id number]  Department: [facility]  Room: [address] [place] [address]-  Date: 12/23/24  Time Calculation  Start Time: 1519  Stop Time: 1536  Time Calculation (min): 17 min    Assessment:  OT Assessment: Patient esily fatigued this date limiting session. Increased independence with ambulaiton noted though R UE still lacking strength and coordiantion. Will continue to engage R UE in all ADLs.  End of Session Communication: Bedside nurse, Care Coordinator  End of Session Patient Position: Up in chair, Alarm on  Plan:  Treatment Interventions: ADL retraining, Functional transfer training, Endurance training, Neuromuscular reeducation  OT Frequency: 4 times per week  OT Discharge Recommendations: Moderate intensity level of continued care, High intensity level of continued care, 24 hr supervision due to cognition  Equipment Recommended upon Discharge:  (TBD)  OT Recommended Transfer Status: Stand by assist, Assist of 2  OT - OK to Discharge: Yes    Subjective   Previous Visit Info:  OT Last Visit  OT Received On: 12/23/24    General:  General  OT Missed Visit: Yes  Missed Visit Reason: Patient refused  Prior to Session Communication: Bedside nurse  Patient Position Received: Bed, 2 rail up, Alarm on  General Comment: Pt pleseant and cooperative throughout session. Left with all needs in reach.    Precautions:  Medical Precautions: Fall precautions, Neuro precautions    Pain Assessment:  Pain Assessment  Pain Assessment: 0-10  0-10 (Numeric) Pain Score: 0 - No pain     Objective   Cognition:  Overall Cognitive Status: Impaired at baseline  Orientation Level: Unable to assess (pt just repeatredly stating to therapists, \"I am not today, today, today...\")  Safety/Judgement: Exceptions to WFL  Complex Functional Tasks: Maximal  Novel Situations: Maximal  Routine Tasks: Maximal  Insight: Moderate  Impulsive: Severely    Activities of Daily Living:   Grooming  Grooming " Level of Assistance: Contact guard, Maximum verbal cues, Setup  Grooming Where Assessed: Standing sinkside  Grooming Comments: oral care and hand washing; poor safety awareness; R sided weakness and lack of coordination noted. Increased difficulty manipulating containers.    LE Dressing  LE Dressing: Yes  Pants Level of Assistance: Setup, Contact guard, Minimal verbal cues  LE Dressing Where Assessed: Edge of bed  LE Dressing Comments: good balance in standing when pulling up with less R lean noted.    Toileting  Toileting Level of Assistance: Contact guard, Minimal verbal cues  Where Assessed: Toilet     Bed Mobility/Transfers:   Bed Mobility  Bed Mobility: Yes  Bed Mobility 1  Bed Mobility 1: Supine to sitting  Level of Assistance 1: Distant supervision  Bed Mobility Comments 1: HOB elevated, using rail; impulsivness noted.    Transfers  Transfer: Yes  Transfer 1  Transfer From 1: Bed to  Transfer to 1: Stand  Technique 1: Sit to stand, Stand to sit  Transfer Level of Assistance 1: Contact guard  Trials/Comments 1: poor safety awareness  Transfers 2  Transfer From 2: Toilet to  Transfer to 2: Stand  Technique 2: Sit to stand, Stand to sit  Transfer Level of Assistance 2: Contact guard, Minimal verbal cues  Trials/Comments 2: cues for hand placement with good follow through    Functional Mobility:  Functional Mobility  Functional Mobility Performed: Yes  Functional Mobility 1  Surface 1: Level tile  Device 1:  (holding onto patients pants d/t patient not following cuyes for walker.)  Assistance 1: Contact guard, Minimal verbal cues    Outcome Measures:  [facility] Daily Activity  Putting on and taking off regular lower body clothing: A little  Bathing (including washing, rinsing, drying): A little  Putting on and taking off regular upper body clothing: A little  Toileting, which includes using toilet, bedpan or urinal: A little  Taking care of personal grooming such as brushing teeth: A little  Eating Meals: A  little  Daily Activity - Total Score: 18    Goals:  Encounter Problems       Encounter Problems (Active)       ADLs       Patient will participate in bathing with supervision/set-up while seated at sink or standing at sink using PRN adaptive equipment.         Start:  12/19/24    Expected End:  01/02/25            Patient will participate in upper and lower body dressing with supervision/set-up while sitting in chair or standing using PRN adaptive equipment.    (Progressing)       Start:  12/19/24    Expected End:  01/02/25            Patient will participate in all tolieting activities with supervision/set-up using an elevated toilet seat and grab bars.  (Progressing)       Start:  12/19/24    Expected End:  01/02/25               BALANCE       Pt will maintain dynamic standing balance during ADL task with supervision level of assistance in order to demonstrate decreased risk of falling and improved postural control. (Progressing)       Start:  12/19/24    Expected End:  01/02/25               COGNITION/SAFETY       Pt will follow simple commands 100% of the time during OT tx session to increase independence and safety with ADLs.  (Progressing)       Start:  12/19/24    Expected End:  01/02/25               TRANSFERS       Patient will participate in bed mobility with supervision/set-up to increase safety with mobility at home. (Progressing)       Start:  12/19/24    Expected End:  01/02/25            Patient will participate in sit to stand transfers with supervision/set-up using LRAD to increase safety with ADLs.  (Progressing)       Start:  12/19/24    Expected End:  01/02/25

## 2024-12-24 LAB — GLUCOSE BLD MANUAL STRIP-MCNC: 111 MG/DL (ref 74–99)

## 2024-12-24 PROCEDURE — 2500000001 HC RX 250 WO HCPCS SELF ADMINISTERED DRUGS (ALT 637 FOR MEDICARE OP): Mod: IPSPLIT | Performed by: NURSE PRACTITIONER

## 2024-12-24 PROCEDURE — 94760 N-INVAS EAR/PLS OXIMETRY 1: CPT | Mod: IPSPLIT

## 2024-12-24 PROCEDURE — 2500000002 HC RX 250 W HCPCS SELF ADMINISTERED DRUGS (ALT 637 FOR MEDICARE OP, ALT 636 FOR OP/ED): Mod: IPSPLIT | Performed by: NURSE PRACTITIONER

## 2024-12-24 PROCEDURE — 99231 SBSQ HOSP IP/OBS SF/LOW 25: CPT | Performed by: NURSE PRACTITIONER

## 2024-12-24 PROCEDURE — 82947 ASSAY GLUCOSE BLOOD QUANT: CPT | Mod: IPSPLIT

## 2024-12-24 PROCEDURE — 1100000001 HC PRIVATE ROOM DAILY: Mod: IPSPLIT

## 2024-12-24 RX ADMIN — LORAZEPAM 0.5 MG: 0.5 TABLET ORAL at 20:30

## 2024-12-24 RX ADMIN — RALOXIFENE 60 MG: 60 TABLET ORAL at 09:18

## 2024-12-24 RX ADMIN — METOPROLOL TARTRATE 25 MG: 25 TABLET, FILM COATED ORAL at 09:18

## 2024-12-24 RX ADMIN — APIXABAN 2.5 MG: 2.5 TABLET, FILM COATED ORAL at 20:30

## 2024-12-24 RX ADMIN — METOPROLOL TARTRATE 25 MG: 25 TABLET, FILM COATED ORAL at 20:29

## 2024-12-24 RX ADMIN — TRAZODONE HYDROCHLORIDE 50 MG: 50 TABLET ORAL at 20:30

## 2024-12-24 RX ADMIN — APIXABAN 2.5 MG: 2.5 TABLET, FILM COATED ORAL at 09:18

## 2024-12-24 RX ADMIN — QUETIAPINE FUMARATE 25 MG: 25 TABLET ORAL at 20:30

## 2024-12-24 RX ADMIN — AMLODIPINE BESYLATE 10 MG: 5 TABLET ORAL at 09:18

## 2024-12-24 ASSESSMENT — COGNITIVE AND FUNCTIONAL STATUS - GENERAL
CLIMB 3 TO 5 STEPS WITH RAILING: A LOT
WALKING IN HOSPITAL ROOM: A LITTLE
MOVING TO AND FROM BED TO CHAIR: A LITTLE
DAILY ACTIVITIY SCORE: 24
MOBILITY SCORE: 20

## 2024-12-24 ASSESSMENT — PAIN - FUNCTIONAL ASSESSMENT
PAIN_FUNCTIONAL_ASSESSMENT: 0-10
PAIN_FUNCTIONAL_ASSESSMENT: 0-10

## 2024-12-24 ASSESSMENT — PAIN SCALES - GENERAL
PAINLEVEL_OUTOF10: 0 - NO PAIN
PAINLEVEL_OUTOF10: 0 - NO PAIN

## 2024-12-24 NOTE — PROGRESS NOTES
Agatha Saucedo is a 89 y.o. female on day 4 of admission presenting with Paroxysmal atrial fibrillation (Multi).      Subjective   Patient denies issues and expressed that she slept well last night.  Continues with expressive aphasia that is waxing and waning. She is improved so far this morning.   Await pre-cert and is medically stable for discharge to nursing home.        Objective     Last Recorded Vitals  /60 (BP Location: Left arm, Patient Position: Lying)   Pulse 73   Temp 36.8 °C (98.2 °F) (Temporal)   Resp 16   Wt 63.2 kg (139 lb 6.4 oz)   SpO2 94%   Intake/Output last 3 Shifts:    Intake/Output Summary (Last 24 hours) at 12/24/2024 0853  Last data filed at 12/23/2024 1700  Gross per 24 hour   Intake 360 ml   Output --   Net 360 ml       Admission Weight  Weight: 63.2 kg (139 lb 6.4 oz) (12/18/24 1315)    Daily Weight  12/18/24 : 63.2 kg (139 lb 6.4 oz)    Image Results  MR brain wo IV contrast  Narrative: Interpreted By:  Melody Sloan,   STUDY:  MR BRAIN WO IV CONTRAST;  12/20/2024 10:36 am      INDICATION:  Signs/Symptoms:confusion, leaning to the left.          COMPARISON:  None.      ACCESSION NUMBER(S):  [id number]      ORDERING CLINICIAN:  JOSE A PRATT      TECHNIQUE:  Axial T2, FLAIR, DWI, gradient echo T2 and sagittal and coronal T1  weighted images of brain were acquired.      FINDINGS:  CSF Spaces: The ventricles, sulci and basal cisterns are prominent  compatible with age related involutional changes and moderate volume  loss. There is no extra-axial fluid collection.      Parenchyma: There is cortical and subcortical diffusion restriction  within the left temporal and parietal lobes compatible with an acute  infarct. There are few punctate foci of cortical diffusion  restriction within the left parietal lobe compatible with additional  small cortical infarcts. There is associated T2 and FLAIR signal  abnormality. There is no evidence of hemorrhage. There is mild local  mass  effect with effacement of the sulci. No midline shift. There is  no focal parenchymal signal abnormality.  There is no mass effect or  midline shift.      Paranasal Sinuses and Mastoids: Visualized paranasal sinuses and  mastoid air cells are predominantly clear.      Impression: Acute infarct involving the left parietal and temporal lobes. There  is associated T2 and FLAIR signal abnormality with mild local mass  effect. No midline shift. No associated hemorrhage.      MACRO:  None      Signed by: Melody Sloan 12/20/2024 10:42 AM  Dictation workstation:   [id number]  MR angio head wo IV contrast  Narrative: Interpreted By:  Melody Sloan,   STUDY:  MR ANGIO HEAD WO IV CONTRAST;  12/20/2024 10:25 am      INDICATION:  Signs/Symptoms:dizziness.          COMPARISON:  None.      ACCESSION NUMBER(S):  [id number]      ORDERING CLINICIAN:  JOSE A PRATT      TECHNIQUE:  Time-of-flight MRA of the head was performed. The images were  reviewed as source images and maximum intensity projections.      FINDINGS:  The examination is moderately degraded by patient motion artifact.      Anterior circulation:    Grossly there is expected flow signal in  bilateral intracranial internal carotid arteries, bilateral carotid  terminals, bilateral proximal anterior and middle cerebral arteries.      Posterior circulation:    Grossly the bilateral intracranial  vertebral arteries, vertebrobasilar junction, basilar artery and  proximal posterior cerebral arteries demonstrate expected flow  signal. The right posterior cerebral artery is predominantly supplied  by robust posterior communicating artery.      Impression: The examination is moderately degraded by patient motion artifact.  1. Grossly there is no evidence for hemodynamically significant  stenosis or large branch vessel cutoffs of the visualized  intracranial vasculature.      MACRO:  None      Signed by: Melody Sloan 12/20/2024 10:31 AM  Dictation workstation:   [id number]  MR  angio neck wo IV contrast  Narrative: Interpreted By:  Melody Sloan,   STUDY:  MR ANGIO NECK WO IV CONTRAST;  12/20/2024 10:20 am      INDICATION:  Signs/Symptoms:dizziness.          COMPARISON:  None.      ACCESSION NUMBER(S):  [id number]      ORDERING CLINICIAN:  JOSE A PRATT      TECHNIQUE:  Time of flight MRA of the neck was performed. The images were  reviewed as source images and maximum intensity projections.      FINDINGS:  The source images are mildly degraded by artifact.      Right carotid vessels:  There is expected flow signal in the  visualized portion of the common carotid artery.  There is mild  attenuation of flow signal at the carotid bifurcation which may be  secondary to flow related artifact. The internal carotid artery in  the neck demonstrates expected flow signal.      Left carotid vessels:   There is expected flow signal in the  visualized portion of the common carotid artery.  There is mild  attenuation of flow signal at the carotid bifurcation which may be  secondary to flow related artifact. The internal carotid artery in  the neck demonstrates expected flow signal.      Vertebral vessels:   The visualized segments of the cervical  vertebral arteries demonstrate expected flow signal.      Impression: No evidence of significant stenosis on MRA of the neck.      MACRO:  None      Signed by: Melody Sloan 12/20/2024 10:27 AM  Dictation workstation:   [id number]    No results found for this or any previous visit (from the past 24 hours).    Physical Exam  Constitutional:       Appearance: She is not ill-appearing.   HENT:      Head: Atraumatic.      Nose: Nose normal.   Eyes:      Pupils: Pupils are equal, round, and reactive to light.   Cardiovascular:      Rate and Rhythm: Normal rate.      Pulses: Normal pulses.   Pulmonary:      Effort: Pulmonary effort is normal.      Breath sounds: Normal breath sounds.   Abdominal:      General: Bowel sounds are normal.   Musculoskeletal:          General: Normal range of motion.   Skin:     General: Skin is warm.      Findings: Bruising present.   Neurological:      Mental Status: She is alert.   Psychiatric:         Attention and Perception: She is inattentive.         Mood and Affect: Mood is improved with intermittent anxiety.          Speech: Speech is slurred with expressive aphasia today.         Cognition and Memory: Memory is impaired.     Scheduled medications  amLODIPine, 10 mg, oral, Daily  apixaban, 2.5 mg, oral, q12h  metoprolol tartrate, 25 mg, oral, BID  pantoprazole, 40 mg, oral, Daily before breakfast  QUEtiapine, 25 mg, oral, Nightly  raloxifene, 60 mg, oral, Daily  traZODone, 50 mg, oral, Nightly      Continuous medications     PRN medications  PRN medications: acetaminophen, LORazepam, metoprolol, ondansetron, polyethylene glycol      Assessment/Plan      Acute Ischemic Stroke (Left parietotemporal infarct)   Paroxysmal atrial fibrillation, new onset  Essential HTN  - 12/20 MRI brain w/o contrast  - Acute infarct involving the left parietal and temporal lobes.   - neuro tele recs appreciated - check lipid panel in the am and start statin for LDL > 70   - son states pt lack of coordination and right sided weakness significantly worse as well as clarity of speech   -   - serial trop 8 > 8  - EKG reviewed, atrial fibrillation with   - given metoprolol tartrate in the ED  - converted to NSR in the ED  - continue metoprolol tartrate 25 mg BID, amlodipine 10 mg daily  - started apixaban 2.5 mg BID - (lower dose given fall risk and discussion with the son)   - hold losartan d/t renal function  - cardiac monitoring via telemetry  - cardiology consult  - echocardiogram - nl EF and ? Diastolic   12/21  Equal strength without neglect~expressive aphasia~slurred speech~improving     Acute on chronic Kidney injury, stage 3a  - baseline creatine 1.2  - creatine on admission 1.62  - given 500 mL bolus of IVF in the Ed  - hold losartan  -  avoid nephrotoxic medication  - monitor renal function     Osteoporosis  - continue raloxifene 60 mg daily     Insomnia  - continue trazodone 50 mg daily     PUD ppx  - pantoprazole 40 mg daily     DVT ppx  - apixaban 2.5 mg BID     Disposition: Admitted and positive for CVA. Medically ready for discharge and await pre-cert for [facility].     Cinthia Mead, APRN-CNP

## 2024-12-24 NOTE — PROGRESS NOTES
Physical Therapy                 Therapy Communication Note    Patient Name: Agatha Saucedo  MRN: [id number]  Department: [facility]  Room: [address] [place] [address]-[place]  Today's Date: 12/24/2024     Discipline: Physical Therapy          Missed Visit Reason: Missed Visit Reason: Patient refused (OT hand off that pt is adamently declining therapeutic interventions this AM. PTA presenting to pt room and pt is back to sleep. No PT at this time.)    Missed Time: Attempt 0948

## 2024-12-24 NOTE — PROGRESS NOTES
12/24/24 1212   Discharge Planning   Assistance Needed Not participating in therapies, awaiting per-cert   Expected Discharge Disposition SNF   Patient Choice   Provider Choice list and CMS website (https://medicare.gov/care-compare#search) for post-acute Quality and Resource Measure Data were provided and reviewed with: Family   Patient / Family choosing to utilize agency / facility established prior to hospitalization No     Pt is consistently refusing therapies in the morning. This info was sent to [facility] for review. SW will not be able to gt pre-cert if pt is refusing therapies. TCC to follow for transitional support. JAIDEN Solomon

## 2024-12-24 NOTE — CARE PLAN
The patient's goals for the shift include      The clinical goals for the shift include pt will use call bell and wait for assistance this shift    Patient rested majority of shift. Denies pain. Call light within reach. Will continue with poc.

## 2024-12-24 NOTE — PROGRESS NOTES
"Occupational Therapy                 Therapy Communication Note    Patient Name: Agatha Saucedo  MRN: [id number]  Department: [facility]  Room: [address] [place] [address]  Today's Date: 12/24/2024     Discipline: Occupational Therapy    OT Missed Visit: Yes     Missed Visit Reason: Missed Visit Reason: Patient refused    Missed Time: Attempt    Comment: Patient laying in bed refusing to participate in OT at this time. Patient reporting, \"I am content\" and \"I just don't want to do it\". Further encouragement provided including importance of rehab following CVA, though patient not receptive. No OT treatment completed.     Attempt time: 0946-0948  "

## 2024-12-24 NOTE — PROGRESS NOTES
12/24/24 1113   Discharge Planning   Assistance Needed will need PT/OT participation to get auth   Expected Discharge Disposition SNF     Son- Meño was notified that pt is not participating in therapies today and that we will try again on Thursday mid afternoon. She does notseem to want to do anything in the mornings. JAIDEN Solomon

## 2024-12-24 NOTE — SIGNIFICANT EVENT
Bed alarm sounding, this RN went to patient room and found patient sitting on floor next to bed    Patient states she has no pain and did not hit her head, VSS, , Cinthia Mead notified     Patient confused at baseline, but states she just wanted to get up    Patient got up with minimal assist and placed back in bed, alarm set, call bell in reach, patient educated on use of call bell and not to get up without assistance

## 2024-12-25 LAB
EST. AVERAGE GLUCOSE BLD GHB EST-MCNC: 117 MG/DL
HBA1C MFR BLD: 5.7 %
HOLD SPECIMEN: NORMAL
HOLD SPECIMEN: NORMAL

## 2024-12-25 PROCEDURE — 2500000001 HC RX 250 WO HCPCS SELF ADMINISTERED DRUGS (ALT 637 FOR MEDICARE OP): Mod: IPSPLIT | Performed by: NURSE PRACTITIONER

## 2024-12-25 PROCEDURE — 83036 HEMOGLOBIN GLYCOSYLATED A1C: CPT | Mod: CONLAB | Performed by: NURSE PRACTITIONER

## 2024-12-25 PROCEDURE — 2500000004 HC RX 250 GENERAL PHARMACY W/ HCPCS (ALT 636 FOR OP/ED): Mod: IPSPLIT | Performed by: NURSE PRACTITIONER

## 2024-12-25 PROCEDURE — 97535 SELF CARE MNGMENT TRAINING: CPT | Mod: GO,IPSPLIT | Performed by: OCCUPATIONAL THERAPIST

## 2024-12-25 PROCEDURE — 36415 COLL VENOUS BLD VENIPUNCTURE: CPT | Mod: IPSPLIT | Performed by: NURSE PRACTITIONER

## 2024-12-25 PROCEDURE — 36415 COLL VENOUS BLD VENIPUNCTURE: CPT | Mod: IPSPLIT | Performed by: INTERNAL MEDICINE

## 2024-12-25 PROCEDURE — 2500000002 HC RX 250 W HCPCS SELF ADMINISTERED DRUGS (ALT 637 FOR MEDICARE OP, ALT 636 FOR OP/ED): Mod: IPSPLIT | Performed by: NURSE PRACTITIONER

## 2024-12-25 PROCEDURE — 1100000001 HC PRIVATE ROOM DAILY: Mod: IPSPLIT

## 2024-12-25 PROCEDURE — 99231 SBSQ HOSP IP/OBS SF/LOW 25: CPT | Performed by: NURSE PRACTITIONER

## 2024-12-25 PROCEDURE — 94760 N-INVAS EAR/PLS OXIMETRY 1: CPT | Mod: IPSPLIT

## 2024-12-25 RX ADMIN — METOPROLOL TARTRATE 25 MG: 25 TABLET, FILM COATED ORAL at 08:25

## 2024-12-25 RX ADMIN — APIXABAN 2.5 MG: 2.5 TABLET, FILM COATED ORAL at 20:42

## 2024-12-25 RX ADMIN — METOPROLOL TARTRATE 25 MG: 25 TABLET, FILM COATED ORAL at 20:42

## 2024-12-25 RX ADMIN — POLYETHYLENE GLYCOL 3350 17 G: 17 POWDER, FOR SOLUTION ORAL at 13:57

## 2024-12-25 RX ADMIN — APIXABAN 2.5 MG: 2.5 TABLET, FILM COATED ORAL at 08:25

## 2024-12-25 RX ADMIN — AMLODIPINE BESYLATE 10 MG: 5 TABLET ORAL at 08:26

## 2024-12-25 RX ADMIN — TRAZODONE HYDROCHLORIDE 50 MG: 50 TABLET ORAL at 20:42

## 2024-12-25 RX ADMIN — QUETIAPINE FUMARATE 25 MG: 25 TABLET ORAL at 20:42

## 2024-12-25 RX ADMIN — LORAZEPAM 0.5 MG: 0.5 TABLET ORAL at 20:43

## 2024-12-25 RX ADMIN — RALOXIFENE 60 MG: 60 TABLET ORAL at 08:26

## 2024-12-25 ASSESSMENT — PAIN - FUNCTIONAL ASSESSMENT
PAIN_FUNCTIONAL_ASSESSMENT: 0-10
PAIN_FUNCTIONAL_ASSESSMENT: 0-10

## 2024-12-25 ASSESSMENT — COGNITIVE AND FUNCTIONAL STATUS - GENERAL
TOILETING: A LITTLE
WALKING IN HOSPITAL ROOM: A LITTLE
EATING MEALS: A LITTLE
MOBILITY SCORE: 22
PERSONAL GROOMING: A LITTLE
CLIMB 3 TO 5 STEPS WITH RAILING: A LITTLE
DRESSING REGULAR UPPER BODY CLOTHING: A LITTLE
DAILY ACTIVITIY SCORE: 18
DRESSING REGULAR LOWER BODY CLOTHING: A LITTLE
PERSONAL GROOMING: A LITTLE
DAILY ACTIVITIY SCORE: 19
HELP NEEDED FOR BATHING: A LITTLE
DRESSING REGULAR LOWER BODY CLOTHING: A LITTLE
HELP NEEDED FOR BATHING: A LITTLE
EATING MEALS: A LITTLE
DRESSING REGULAR UPPER BODY CLOTHING: A LITTLE

## 2024-12-25 ASSESSMENT — ACTIVITIES OF DAILY LIVING (ADL)
BATHING_LEVEL_OF_ASSISTANCE: SETUP;CONTACT GUARD;MINIMAL VERBAL CUES
HOME_MANAGEMENT_TIME_ENTRY: 24
BATHING_WHERE_ASSESSED: STANDING SINKSIDE

## 2024-12-25 ASSESSMENT — PAIN SCALES - GENERAL
PAINLEVEL_OUTOF10: 0 - NO PAIN

## 2024-12-25 NOTE — CARE PLAN
The clinical goals for the shift include Pt to use call bell for assistance and wait for assistance.    Patient very cooperative and pleasant. Episodes of nonsensical speech but generally with good communication for most of the day. Patient medication compliant, whole with thin liquids. Ambulated with 1 assist with steady gait with some listing to left side. Good food and fluid intake. Patient given miralax with no result as yet. Abdomen is non-tender, bowel sounds present. Patient with no increased weakness noted to either side. Patient encouraged to drink more fluids. Napped in bed, alarm on, continent of bladder.

## 2024-12-25 NOTE — PROGRESS NOTES
Agatha Saucedo is a 89 y.o. female on day 5 of admission presenting with Paroxysmal atrial fibrillation (Multi).      Subjective   Patient is seen in her room. She denies any complaints.   Awaiting placement.        Objective     Last Recorded Vitals  /69 (BP Location: Left arm)   Pulse 80   Temp 36.7 °C (98.1 °F) (Temporal)   Resp 16   Wt 63.2 kg (139 lb 6.4 oz)   SpO2 96%   Intake/Output last 3 Shifts:    Intake/Output Summary (Last 24 hours) at 12/25/2024 1141  Last data filed at 12/25/2024 1000  Gross per 24 hour   Intake 840 ml   Output --   Net 840 ml       Admission Weight  Weight: 63.2 kg (139 lb 6.4 oz) (12/18/24 1315)    Daily Weight  12/18/24 : 63.2 kg (139 lb 6.4 oz)    Image Results  MR brain wo IV contrast  Narrative: Interpreted By:  Melody Sloan,   STUDY:  MR BRAIN WO IV CONTRAST;  12/20/2024 10:36 am      INDICATION:  Signs/Symptoms:confusion, leaning to the left.          COMPARISON:  None.      ACCESSION NUMBER(S):  [id number]      ORDERING CLINICIAN:  JOSE A PRATT      TECHNIQUE:  Axial T2, FLAIR, DWI, gradient echo T2 and sagittal and coronal T1  weighted images of brain were acquired.      FINDINGS:  CSF Spaces: The ventricles, sulci and basal cisterns are prominent  compatible with age related involutional changes and moderate volume  loss. There is no extra-axial fluid collection.      Parenchyma: There is cortical and subcortical diffusion restriction  within the left temporal and parietal lobes compatible with an acute  infarct. There are few punctate foci of cortical diffusion  restriction within the left parietal lobe compatible with additional  small cortical infarcts. There is associated T2 and FLAIR signal  abnormality. There is no evidence of hemorrhage. There is mild local  mass effect with effacement of the sulci. No midline shift. There is  no focal parenchymal signal abnormality.  There is no mass effect or  midline shift.      Paranasal Sinuses and Mastoids:  Visualized paranasal sinuses and  mastoid air cells are predominantly clear.      Impression: Acute infarct involving the left parietal and temporal lobes. There  is associated T2 and FLAIR signal abnormality with mild local mass  effect. No midline shift. No associated hemorrhage.      MACRO:  None      Signed by: Melody Sloan 12/20/2024 10:42 AM  Dictation workstation:   [id number]  MR angio head wo IV contrast  Narrative: Interpreted By:  Melody Sloan,   STUDY:  MR ANGIO HEAD WO IV CONTRAST;  12/20/2024 10:25 am      INDICATION:  Signs/Symptoms:dizziness.          COMPARISON:  None.      ACCESSION NUMBER(S):  [id number]      ORDERING CLINICIAN:  JOSE A PRATT      TECHNIQUE:  Time-of-flight MRA of the head was performed. The images were  reviewed as source images and maximum intensity projections.      FINDINGS:  The examination is moderately degraded by patient motion artifact.      Anterior circulation:    Grossly there is expected flow signal in  bilateral intracranial internal carotid arteries, bilateral carotid  terminals, bilateral proximal anterior and middle cerebral arteries.      Posterior circulation:    Grossly the bilateral intracranial  vertebral arteries, vertebrobasilar junction, basilar artery and  proximal posterior cerebral arteries demonstrate expected flow  signal. The right posterior cerebral artery is predominantly supplied  by robust posterior communicating artery.      Impression: The examination is moderately degraded by patient motion artifact.  1. Grossly there is no evidence for hemodynamically significant  stenosis or large branch vessel cutoffs of the visualized  intracranial vasculature.      MACRO:  None      Signed by: Melody Sloan 12/20/2024 10:31 AM  Dictation workstation:   [id number]  MR angio neck wo IV contrast  Narrative: Interpreted By:  Melody Sloan,   STUDY:  MR ANGIO NECK WO IV CONTRAST;  12/20/2024 10:20 am      INDICATION:  Signs/Symptoms:dizziness.           COMPARISON:  None.      ACCESSION NUMBER(S):  [id number]      ORDERING CLINICIAN:  JOSE A PRATT      TECHNIQUE:  Time of flight MRA of the neck was performed. The images were  reviewed as source images and maximum intensity projections.      FINDINGS:  The source images are mildly degraded by artifact.      Right carotid vessels:  There is expected flow signal in the  visualized portion of the common carotid artery.  There is mild  attenuation of flow signal at the carotid bifurcation which may be  secondary to flow related artifact. The internal carotid artery in  the neck demonstrates expected flow signal.      Left carotid vessels:   There is expected flow signal in the  visualized portion of the common carotid artery.  There is mild  attenuation of flow signal at the carotid bifurcation which may be  secondary to flow related artifact. The internal carotid artery in  the neck demonstrates expected flow signal.      Vertebral vessels:   The visualized segments of the cervical  vertebral arteries demonstrate expected flow signal.      Impression: No evidence of significant stenosis on MRA of the neck.      MACRO:  None      Signed by: Melody Sloan 12/20/2024 10:27 AM  Dictation workstation:   [id number]    Results for orders placed or performed during the hospital encounter of 12/18/24 (from the past 24 hours)   Hemoglobin A1c   Result Value Ref Range    Hemoglobin A1C 5.7 (H) See comment %    Estimated Average Glucose 117 Not Established mg/dL   Green Top   Result Value Ref Range    Extra Tube Hold for add-ons.    Lavender Top   Result Value Ref Range    Extra Tube Hold for add-ons.      Physical Exam  Constitutional:       Appearance: She is not ill-appearing.   HENT:      Head: Atraumatic.      Nose: Nose normal.   Eyes:      Pupils: Pupils are equal, round, and reactive to light.   Cardiovascular:      Rate and Rhythm: Normal rate.      Pulses: Normal pulses.   Pulmonary:      Effort: Pulmonary effort is  normal.      Breath sounds: Normal breath sounds.   Abdominal:      General: Bowel sounds are normal.   Musculoskeletal:         General: Normal range of motion.   Skin:     General: Skin is warm.      Findings: Bruising present.   Neurological:      Mental Status: She is alert.   Psychiatric:         Attention and Perception: She is inattentive.         Mood and Affect: Mood is improved with intermittent anxiety.          Speech: Speech is slurred with expressive aphasia today.         Cognition and Memory: Memory is impaired.     Scheduled medications  amLODIPine, 10 mg, oral, Daily  apixaban, 2.5 mg, oral, q12h  metoprolol tartrate, 25 mg, oral, BID  pantoprazole, 40 mg, oral, Daily before breakfast  QUEtiapine, 25 mg, oral, Nightly  raloxifene, 60 mg, oral, Daily  traZODone, 50 mg, oral, Nightly      Continuous medications     PRN medications  PRN medications: acetaminophen, LORazepam, metoprolol, ondansetron, polyethylene glycol      Assessment/Plan        #FALL  ~nursing report fall in room on 12/24/24  ~no injuries noted.     Acute Ischemic Stroke (Left parietotemporal infarct)   Paroxysmal atrial fibrillation, new onset  Essential HTN  - 12/20 MRI brain w/o contrast  - Acute infarct involving the left parietal and temporal lobes.   - neuro tele recs appreciated - check lipid panel in the am and start statin for LDL > 70   - son states pt lack of coordination and right sided weakness significantly worse as well as clarity of speech   -   - serial trop 8 > 8  - EKG reviewed, atrial fibrillation with   - given metoprolol tartrate in the ED  - converted to NSR in the ED  - continue metoprolol tartrate 25 mg BID, amlodipine 10 mg daily  - started apixaban 2.5 mg BID - (lower dose given fall risk and discussion with the son)   - hold losartan d/t renal function  - cardiac monitoring via telemetry  - cardiology consult  - echocardiogram - nl EF and ? Diastolic   12/21  Equal strength without  neglect~expressive aphasia~slurred speech~improving     Acute on chronic Kidney injury, stage 3a  - baseline creatine 1.2  - creatine on admission 1.62  - given 500 mL bolus of IVF in the Ed  - hold losartan  - avoid nephrotoxic medication  - monitor renal function     Osteoporosis  - continue raloxifene 60 mg daily     Insomnia  - continue trazodone 50 mg daily     PUD ppx  - pantoprazole 40 mg daily     DVT ppx  - apixaban 2.5 mg BID     Disposition: Admitted and positive for CVA. Medically ready for discharge and await pre-cert for Veena Bazan.     Cinthia Mead, APRN-CNP

## 2024-12-25 NOTE — PROGRESS NOTES
Occupational Therapy    Occupational Therapy Treatment    Name: Agatha Saucedo  MRN: [id number]  Department: [facility]  Room: [address] [place] [address]  Date: 12/25/24  Time Calculation  Start Time: 1320  Stop Time: 1344  Time Calculation (min): 24 min    Assessment:  OT Assessment: pt participated well with therapy with cueing for sequencing. Patient participated in full ADL in recliner and sinkside. Pt having continued difficulty with speech/aphasia. Would benefit from ST, will  dicuss with RUDY  End of Session Communication: Bedside nurse, Physician, PCT/NA/CTA  End of Session Patient Position: Up in chair, Alarm on  Plan:  Treatment Interventions: ADL retraining, Functional transfer training, Endurance training, Neuromuscular reeducation  OT Frequency: 4 times per week  OT Discharge Recommendations: Moderate intensity level of continued care, High intensity level of continued care, 24 hr supervision due to cognition  Equipment Recommended upon Discharge:  (TBD)  OT Recommended Transfer Status: Stand by assist, Assist of 2  OT - OK to Discharge: Yes Based on completed evaluation and care plan recommendations, no barriers to discharge to next site of care       Subjective   Previous Visit Info:  OT Last Visit  OT Received On: 12/25/24  General:  General  Prior to Session Communication: Bedside nurse  Patient Position Received: Up in chair, Alarm on  Precautions:  Medical Precautions: Fall precautions, Neuro precautions  Precautions Comment: decreased cognition    Pain Assessment:  Pain Assessment  0-10 (Numeric) Pain Score: 0 - No pain     Objective   Activities of Daily Living: Grooming  Grooming Level of Assistance: Setup, Minimal verbal cues  Grooming Where Assessed: Standing sinkside  Grooming Comments: cues for sequencing    UE Bathing  UE Bathing Level of Assistance: Setup, Moderate verbal cues  UE Bathing Where Assessed: Sitting sinkside    LE Bathing  LE Bathing Level of Assistance: Setup, Contact guard, Minimal verbal cues  LE  Bathing Where Assessed: Standing sinkside    UE Dressing  UE Dressing Level of Assistance: Minimum assistance, Minimal verbal cues  UE Dressing Where Assessed: Chair    LE Dressing  Pants Level of Assistance: Setup, Contact guard, Minimal verbal cues  Adult Briefs Level of Assistance: Setup, Contact guard, Moderate assistance, Moderate verbal cues  LE Dressing Where Assessed: Recliner  LE Dressing Comments: cues for safety    Functional Standing Tolerance:  Functional Standing Tolerance  Time: 5 min  Activity: oral care and grooming at sinkside  Functional Standing Tolerance Comments: cues for hand placement to support self at sinkside    Functional Mobility:  Functional Mobility 1  Surface 1: Level tile  Device 1: Rolling walker  Assistance 1: Contact guard  Comments 1: cues for hand placement and proper use of device, pt wants to leave walker prematurely    Outcome Measures:  [facility] Daily Activity  Putting on and taking off regular lower body clothing: A little  Bathing (including washing, rinsing, drying): A little  Putting on and taking off regular upper body clothing: A little  Toileting, which includes using toilet, bedpan or urinal: A little  Taking care of personal grooming such as brushing teeth: A little  Eating Meals: A little  Daily Activity - Total Score: 18    Goals:  Encounter Problems       Encounter Problems (Active)       ADLs       Patient will participate in bathing with supervision/set-up while seated at sink or standing at sink using PRN adaptive equipment.   (Progressing)       Start:  12/19/24    Expected End:  01/02/25            Patient will participate in upper and lower body dressing with supervision/set-up while sitting in chair or standing using PRN adaptive equipment.    (Progressing)       Start:  12/19/24    Expected End:  01/02/25            Patient will participate in all tolieting activities with supervision/set-up using an elevated toilet seat and grab bars.  (Progressing)        Start:  12/19/24    Expected End:  01/02/25               BALANCE       Pt will maintain dynamic standing balance during ADL task with supervision level of assistance in order to demonstrate decreased risk of falling and improved postural control. (Progressing)       Start:  12/19/24    Expected End:  01/02/25               COGNITION/SAFETY       Pt will follow simple commands 100% of the time during OT tx session to increase independence and safety with ADLs.  (Progressing)       Start:  12/19/24    Expected End:  01/02/25               TRANSFERS       Patient will participate in bed mobility with supervision/set-up to increase safety with mobility at home. (Progressing)       Start:  12/19/24    Expected End:  01/02/25            Patient will participate in sit to stand transfers with supervision/set-up using LRAD to increase safety with ADLs.  (Progressing)       Start:  12/19/24    Expected End:  01/02/25

## 2024-12-25 NOTE — CARE PLAN
The patient's goals for the shift include  NA    The clinical goals for the shift include Pt will have no vomiting this shift    Pt slept most of shift, mostly continent with slight dribbling. No complaints of pain. Confused, unable to follow direction, does not utilize call bell. Bed alarm on.

## 2024-12-26 PROBLEM — I63.9: Status: ACTIVE | Noted: 2024-12-26

## 2024-12-26 PROCEDURE — 92523 SPEECH SOUND LANG COMPREHEN: CPT | Mod: GN,IPSPLIT

## 2024-12-26 PROCEDURE — 2500000002 HC RX 250 W HCPCS SELF ADMINISTERED DRUGS (ALT 637 FOR MEDICARE OP, ALT 636 FOR OP/ED): Mod: IPSPLIT | Performed by: NURSE PRACTITIONER

## 2024-12-26 PROCEDURE — 1100000001 HC PRIVATE ROOM DAILY: Mod: IPSPLIT

## 2024-12-26 PROCEDURE — 99232 SBSQ HOSP IP/OBS MODERATE 35: CPT | Performed by: NURSE PRACTITIONER

## 2024-12-26 PROCEDURE — 97112 NEUROMUSCULAR REEDUCATION: CPT | Mod: GP,IPSPLIT

## 2024-12-26 PROCEDURE — 2500000001 HC RX 250 WO HCPCS SELF ADMINISTERED DRUGS (ALT 637 FOR MEDICARE OP): Mod: IPSPLIT | Performed by: NURSE PRACTITIONER

## 2024-12-26 PROCEDURE — 2500000004 HC RX 250 GENERAL PHARMACY W/ HCPCS (ALT 636 FOR OP/ED): Mod: IPSPLIT | Performed by: NURSE PRACTITIONER

## 2024-12-26 PROCEDURE — 97112 NEUROMUSCULAR REEDUCATION: CPT | Mod: GO,IPSPLIT

## 2024-12-26 PROCEDURE — 94760 N-INVAS EAR/PLS OXIMETRY 1: CPT | Mod: IPSPLIT

## 2024-12-26 RX ADMIN — RALOXIFENE 60 MG: 60 TABLET ORAL at 08:27

## 2024-12-26 RX ADMIN — TRAZODONE HYDROCHLORIDE 50 MG: 50 TABLET ORAL at 21:09

## 2024-12-26 RX ADMIN — ACETAMINOPHEN 650 MG: 325 TABLET ORAL at 21:08

## 2024-12-26 RX ADMIN — AMLODIPINE BESYLATE 10 MG: 5 TABLET ORAL at 08:26

## 2024-12-26 RX ADMIN — METOPROLOL TARTRATE 25 MG: 25 TABLET, FILM COATED ORAL at 21:08

## 2024-12-26 RX ADMIN — APIXABAN 2.5 MG: 2.5 TABLET, FILM COATED ORAL at 21:09

## 2024-12-26 RX ADMIN — QUETIAPINE FUMARATE 25 MG: 25 TABLET ORAL at 21:08

## 2024-12-26 RX ADMIN — METOPROLOL TARTRATE 25 MG: 25 TABLET, FILM COATED ORAL at 08:26

## 2024-12-26 RX ADMIN — LORAZEPAM 0.5 MG: 0.5 TABLET ORAL at 15:04

## 2024-12-26 RX ADMIN — APIXABAN 2.5 MG: 2.5 TABLET, FILM COATED ORAL at 08:26

## 2024-12-26 RX ADMIN — POLYETHYLENE GLYCOL 3350 17 G: 17 POWDER, FOR SOLUTION ORAL at 08:26

## 2024-12-26 ASSESSMENT — COGNITIVE AND FUNCTIONAL STATUS - GENERAL
CLIMB 3 TO 5 STEPS WITH RAILING: A LOT
DAILY ACTIVITIY SCORE: 18
TOILETING: A LITTLE
TURNING FROM BACK TO SIDE WHILE IN FLAT BAD: A LITTLE
STANDING UP FROM CHAIR USING ARMS: A LITTLE
HELP NEEDED FOR BATHING: A LITTLE
EATING MEALS: A LITTLE
MOVING TO AND FROM BED TO CHAIR: A LITTLE
PERSONAL GROOMING: A LITTLE
DRESSING REGULAR LOWER BODY CLOTHING: A LITTLE
DRESSING REGULAR UPPER BODY CLOTHING: A LITTLE
MOVING FROM LYING ON BACK TO SITTING ON SIDE OF FLAT BED WITH BEDRAILS: A LITTLE
WALKING IN HOSPITAL ROOM: A LOT
MOBILITY SCORE: 16

## 2024-12-26 ASSESSMENT — PAIN SCALES - PAIN ASSESSMENT IN ADVANCED DEMENTIA (PAINAD)
BREATHING: NORMAL
CONSOLABILITY: DISTRACTED OR REASSURED BY VOICE/TOUCH
BODYLANGUAGE: TENSE, DISTRESSED PACING, FIDGETING
TOTALSCORE: 3
FACIALEXPRESSION: SAD, FRIGHTENED, FROWN
TOTALSCORE: MEDICATION (SEE MAR);REPOSITIONED

## 2024-12-26 ASSESSMENT — PAIN - FUNCTIONAL ASSESSMENT
PAIN_FUNCTIONAL_ASSESSMENT: 0-10

## 2024-12-26 ASSESSMENT — PAIN SCALES - GENERAL
PAINLEVEL_OUTOF10: 0 - NO PAIN

## 2024-12-26 ASSESSMENT — ACTIVITIES OF DAILY LIVING (ADL): HOME_MANAGEMENT_TIME_ENTRY: 5

## 2024-12-26 NOTE — CARE PLAN
The clinical goals for the shift include Pt to use call bell for assistance and wait for assistance throughout the shift.    Over the shift, the patient did not make progress toward the following goals. Barriers to progression include forgetful/confusion. Pt resting in bed with bed alarm on. AxO to self. Restless before bedtime. OOB without using call light for any assist, reorient at times. No sign of distress and continue POC. Call light within reach.

## 2024-12-26 NOTE — CARE PLAN
The clinical goals for the shift include (P) Patient to utilize call bell and wait for assistance.    Patient had a good day, medication compliant, whole with thin liquids. Patient fed self without difficulty. Encouraging fluids AMAP, good appetite. Patient will claim not hungry, but if has company, will eat better. Patient with increased anxiety this afternoon, increased respirations, increased fidgeting with no apparent cause. Denied need for BR, food, fluid, walk, etc. Talked through some breathing techniques and followed commands but would go back to anxiety as soon as talking stopped. Ativan given with good effect. Safety devices in place, patient pleasantly cooperative this day.

## 2024-12-26 NOTE — PROGRESS NOTES
Physical Therapy    Physical Therapy Treatment    Patient Name: Agatha Saucedo  MRN: [id number]  Department: [facility]  Room: [address] [place] [address]  Today's Date: 12/26/2024  Time Calculation  Start Time: 1308  Stop Time: 1345  Time Calculation (min): 37 min         Assessment/Plan   PT Assessment  End of Session Communication: Bedside nurse, PCT/NA/CTA  Assessment Comment: Pt tolerated session well. Session focused on balance. Pt has improved significantly since initial eval. Pt Cont to be a high fall risk and had mult LOB during session. Rec cont to be mod intensity following DC d/t fall risk but pt freq reduced d/t improvement.  End of Session Patient Position: Up in chair, Alarm on     PT Plan  Treatment/Interventions: Bed mobility, Transfer training, Gait training, Stair training, Balance training, Neuromuscular re-education, Therapeutic exercise, Therapeutic activity, Strengthening, Endurance training, Home exercise program, Positioning, Postural re-education  PT Plan: Ongoing PT  PT Frequency: 3 times per week  PT Discharge Recommendations: Moderate intensity level of continued care  Equipment Recommended upon Discharge:  (TBD)  PT Recommended Transfer Status: Assist x2  PT - OK to Discharge: Yes Based on completed evaluation and care plan recommendations, no barriers to discharge to next site of care       General Visit Information:   PT  Visit  PT Received On: 12/26/24  Response to Previous Treatment: Patient with no complaints from previous session.  General  Prior to Session Communication: Bedside nurse  Patient Position Received: Bed, 2 rail up, Alarm on  General Comment: Pt pleasant and agreeable to participate. Pt cleared by nursing staff. Left with call bell within reach and needs addressed.    Subjective   Precautions:  Precautions  Medical Precautions: Fall precautions  Precautions Comment: decreased cognition    Objective   Pain:  Pain Assessment  Pain Assessment: 0-10  0-10 (Numeric) Pain Score: 0 - No  pain  Cognition:  Cognition  Overall Cognitive Status: Impaired at baseline  Coordination:     Postural Control:  Dynamic Sitting Balance  Dynamic Sitting-Balance Support: No upper extremity supported, Feet supported  Dynamic Sitting-Level of Assistance: Close supervision  Dynamic Sitting-Balance: Forward lean  Dynamic Sitting-Comments: able to lean fwd to thread feet through pants. no LOB noted.  Static Standing Balance  Static Standing-Balance Support: No upper extremity supported  Static Standing-Level of Assistance: Contact guard  Static Standing-Comment/Number of Minutes: mult trials throughout session of short <10 sec static stance trials.  Dynamic Standing Balance  Dynamic Standing-Balance Support: No upper extremity supported  Dynamic Standing-Level of Assistance: Contact guard  Dynamic Standing-Balance: Forward lean, Reaching for objects, Reaching across midline  Dynamic Standing-Comments: able to stand at sink approx 5 min and perform oral hygiene. Able to reach for objects as needed w/o LOB.    Treatments:  Balance/Neuromuscular Re-Education  Balance/Neuromuscular Re-Education Activity Performed: Yes (CGA throughout all unless otherwise noted)  Balance/Neuromuscular Re-Education Activity 1: ball toss 1x10 from PT to pt, intermittent  LOBs both laterally and retro but pt able to self correct.  Balance/Neuromuscular Re-Education Activity 2: 1x10 pt ball toss to self. having increased amount of LOBs fwd as pt was reaching for the ball. needing min A to correct throughout.  Balance/Neuromuscular Re-Education Activity 3: Pt able to stand w NBOS and reach outside JUNG to OT hand as target. Targets OH, laterally on ea side, and at approx knee height. 1x5 w ea hand. sway noted but no LOB.  Balance/Neuromuscular Re-Education Activity 4: amb in small turns around object x2 ea direction. no LOB noted though small increase in unsteadiness noted.  Balance/Neuromuscular Re-Education Activity 5: Pt able to amb while  kicking a ball at a target. Good movement and accuracy of kick noted. Pt unsteady but no major LOB noted.  Balance/Neuromuscular Re-Education Activity 6: Pt amb over airex pad x6 in hallway. 1 instance of LOB while stepping onto pad needing mod A to recover. Otherwise performing fairly well.    Bed Mobility 1  Bed Mobility 1: Supine to sitting  Level of Assistance 1: Distant supervision  Bed Mobility Comments 1: 1 trial, HOB raised slightly    Ambulation/Gait Training 1  Surface 1: Level tile  Device 1: No device  Gait Support Devices: Gait belt  Assistance 1: Contact guard  Quality of Gait 1: Narrow base of support, Diminished heel strike, Inconsistent stride length  Comments/Distance (ft) 1: 1x200 ft and various other short distances in room, fairly good mechanics. 1 LOB requiring min A to recover initially. minor scissoring noted and slight lean twds R noted at times.  Transfer 1  Transfer From 1: Chair with arms to  Transfer to 1: Stand  Technique 1: Sit to stand, Stand to sit  Transfer Device 1: Gait belt  Transfer Level of Assistance 1: Contact guard  Trials/Comments 1: mult trials, good hand placement, quick and uncontrolled descent noted throughout. vcs for hand placement on descent.  Transfers 2  Transfer From 2: Bed to  Transfer to 2: Stand  Technique 2: Sit to stand  Transfer Device 2: Gait belt  Transfer Level of Assistance 2: Contact guard  Trials/Comments 2: 1 trial, similar to above transfers.    Outcome Measures:  [facility] Basic Mobility  Turning from your back to your side while in a flat bed without using bedrails: A little  Moving from lying on your back to sitting on the side of a flat bed without using bedrails: A little  Moving to and from bed to chair (including a wheelchair): A little  Standing up from a chair using your arms (e.g. wheelchair or bedside chair): A little  To walk in hospital room: A lot  Climbing 3-5 steps with railing: A lot  Basic Mobility - Total Score: 16    Education  Documentation  No documentation found.  Education Comments  No comments found.      Encounter Problems       Encounter Problems (Active)       PT Problem       pt will be able to ambulate 10 ft with FWW and minimal assist   (Met)       Start:  12/19/24    Expected End:  01/02/25    Resolved:  12/26/24         Pt will be able to perform STS transfer with supervision  (Progressing)       Start:  12/19/24    Expected End:  01/02/25            Pt will be able to perform all aspects of bed mobility with supervision  (Progressing)       Start:  12/19/24    Expected End:  01/02/25            Pt will demonstrate ability to  object from ground from sitting position, without a device and w supervision (Progressing)       Start:  12/19/24    Expected End:  01/02/25            Pt will demo ability to stand for 1 min w BUE support w proper posturing and no excessive lean (Progressing)       Start:  12/19/24    Expected End:  01/02/25               Pain - Adult

## 2024-12-26 NOTE — PROGRESS NOTES
Speech-Language Pathology    Speech-Language Pathology Speech/Language/Cognition Evaluation    Patient Name: Agatha Saucedo  MRN: [id number]  : 1935  Today's Date: 24  Start time: Start Time: 1105  Stop time: Stop Time: 1124  Time calculation (min) : Time Calculation (min): 19 min      ASSESSMENT  Impressions:  Pt presents with marked cognitive communication impairment with resultant deficits in verbal expression, problem solving and memory. She would benefit from skilled ST to improve communicative quality of life and reduce dependence on others  Prognosis: Good      PLAN  POC:  Frequency: 1-3 times a week  Duration: Current admission  Discharge recommendation: Recommend LOW intensity ST upon DC in order to ensure safety with least restrictive diet.  Treatment/Interventions: Other (Comment)  Strengths: mood, compliance with testing  Barriers to participation in tx: Baseline impaired functional status and Comorbidities    Recommended communication strategies:   simplify language  give pt extra time to respond  repeat your question to make sure pt's answer is accurate    Goals (start date 24. Anticipated time frame for goal attainment: 2 weeks)  Pt will complete phrase level expressive language tasks with 90% acc given mod cues.  Pt will demonstrate orientation x4 given mod cues.   Status: Goal initiated this date   Progress this date: n/a      SUBJECTIVE    PMHx relevant to rehab: Acute Ischemic Stroke (Left parietotemporal infarct)   Paroxysmal atrial fibrillation, new onset  Essential HTN    Chief complaint: Pt was admitted on 24 due to fall, found to have A fib. .    Relevant imaging results:  MR Brain  IMPRESSION:  Acute infarct involving the left parietal and temporal lobes. There  is associated T2 and FLAIR signal abnormality with mild local mass  effect. No midline shift. No associated hemorrhage.24    General Visit Information:    Referred By: Janelle MENENDEZ CNP  Reason for  Referral: Suspected oral/pharyngeal dysphagia, r/o aspiration, determine if MBSS is needed  Date of order: 24  Prior to Session Communication: Bedside nurse    RN cleared pt to participate in session and reported that the patient has a baseline of some dementia, but significant decline in functioning after the stroke. She is much more alert and interactive today than she has been this admit.     Pt reported poor awareness of her environment, but she was alert, pleasant and aphasic.     Pain Assessment  Pain Assessment: 0-10  0-10 (Numeric) Pain Score: 0 - No pain  Damico-Baker FACES Pain Rating: No hurt  PAINAD Score: 0    Orientation: poor accuracy possibly due to aphasia  Ability to follow functional commands: good for simple commands.   Patient positioning: uypright in bed      OBJECTIVE  The following informal assessment tasks were completed:    Oral motor:  Lingual: WFL  Labial: WFL    Motor speech:  Intelligibility:   Articulation accuracy was good. Speech was fluent. Content was aphasic and apparently confused.     Receptive language:  Following 1-step commands:   Answered conversational greetings appropriately.     Expressive language:  Seriated automatics: 10/10  Confrontation namin/10  Phrase completion:  3/10  Generative namin/5    The following formal assessments were completed:  Noms  Spoken language expression - 3    Treatment/Education:  Results and recommendations were relayed to: Patient and Bedside nurse  Education provided: No   Learner: Patient   Barriers to learning: Cognitive limitations barrier

## 2024-12-26 NOTE — PROGRESS NOTES
"Occupational Therapy    Occupational Therapy Treatment    Name: Agatha Saucedo  MRN: [id number]  Department: [facility]  Room: [address] [place] [address]-  Date: 12/26/24  Time Calculation  Start Time: 1307  Stop Time: 1345  Time Calculation (min): 38 min    Assessment:  OT Assessment: Patient progressing towards goals well though will continue to benifit from mod rec d/t fair- balance compared to previously being indepedent at her assisted living facility. Will continue to work on balance with patient encorportating ADLs.  End of Session Communication: Bedside nurse, PCT/NA/CTA  End of Session Patient Position: Up in chair, Alarm on    Plan:  Treatment Interventions: ADL retraining, Functional transfer training, Endurance training, Neuromuscular reeducation  OT Frequency: 4 times per week  OT Discharge Recommendations: Moderate intensity level of continued care, High intensity level of continued care, 24 hr supervision due to cognition  Equipment Recommended upon Discharge:  (TBD)  OT Recommended Transfer Status: Stand by assist, Assist of 2  OT - OK to Discharge: Yes    Subjective   Previous Visit Info:  OT Received On: 12/26/24    General:  General  Prior to Session Communication: Bedside nurse  Patient Position Received: Bed, 2 rail up, Alarm on  General Comment: Pt pleseant and cooperative throughout session. Left with all needs in reach following.    Precautions:  Medical Precautions: Fall precautions, Neuro precautions  Precautions Comment: decreased cognition    Pain Assessment:  Pain Assessment  Pain Assessment: 0-10  0-10 (Numeric) Pain Score: 0 - No pain     Objective   Cognition:  Overall Cognitive Status: Impaired at baseline  Orientation Level: Unable to assess (pt just repeatredly stating to therapists, \"I am not today, today, today...\")  Safety/Judgement: Exceptions to WFL  Complex Functional Tasks: Maximal  Novel Situations: Maximal  Routine Tasks: Maximal  Insight: Moderate  Impulsive: Severely    Activities of Daily " Living:   Grooming  Grooming Level of Assistance: Setup, Moderate verbal cues  Grooming Where Assessed: Standing sinkside  Grooming Comments: able to display increased coordination of R UE this date. Patient able to manage containers with decreased assistance including opening and closing mouthwash and tooth paste.    LE Dressing  LE Dressing: Yes  Pants Level of Assistance: Setup, Contact guard (fair- balance in standing while pulling up pants.)  LE Dressing Where Assessed: Edge of bed     Bed Mobility/Transfers:   Bed Mobility 1  Bed Mobility 1: Supine to sitting  Level of Assistance 1: Distant supervision  Bed Mobility Comments 1: HOB slightly elevated    Transfer 1  Transfer From 1: Bed to  Transfer to 1: Stand  Technique 1: Sit to stand, Stand to sit  Transfer Device 1: Gait belt  Transfer Level of Assistance 1: Contact guard  Trials/Comments 1: impulsive though no LOB noted.  Transfers 2  Transfer From 2: Chair with arms to  Transfer to 2: Stand  Technique 2: Sit to stand, Stand to sit  Transfer Device 2: Gait belt  Transfer Level of Assistance 2: Contact guard  Trials/Comments 2: poor eccentric control on sit, cues for hand placement    Functional Mobility:  Functional Mobility  Functional Mobility Performed: Yes  Functional Mobility 1  Surface 1: Level tile  Device 1: No device  Functional Mobility Support Devices: Gait belt  Assistance 1: Contact guard (hand held)  Comments 1: small LOB noted with pt ability to correct  Functional Mobility 2  Surface 2: Uneven (walking over airx alternating level floor and uneven surface)  Device 2: No device  Functional Mobility Support Devices: Gait belt  Assistance 2: Hand held assistance, Minimum assistance, Minimal verbal cues  Comments 2: 1 LOB with min a to correct.     Therapy/Activity:   Balance/Neuromuscular Re-Education  Balance/Neuromuscular Re-Education Activity Performed: Yes  Balance/Neuromuscular Re-Education Activity 1: ball toss 1x10 lateral and retro  leans with min LOB CGA/min a  Balance/Neuromuscular Re-Education Activity 2: 2x10 ball toss to self. increased difficulty with min a to correct LOB. Reaching for ball when tossed difficult for patient  Balance/Neuromuscular Re-Education Activity 3: patientstanding without UE  support and feet together to reach and tap hand laterally, OH, and knee height. Complted with both hands 1x5 with CGA and no LOB.  Balance/Neuromuscular Re-Education Activity 4: functional ambulation turns x2 around an object with intertermitant unsteadyness. CGA throughout.  Balance/Neuromuscular Re-Education Activity 5: kicking and dribbling ball to target. no LOB noted. Good accuracy towards target.    Outcome Measures:  [facility] Daily Activity  Putting on and taking off regular lower body clothing: A little  Bathing (including washing, rinsing, drying): A little  Putting on and taking off regular upper body clothing: A little  Toileting, which includes using toilet, bedpan or urinal: A little  Taking care of personal grooming such as brushing teeth: A little  Eating Meals: A little  Daily Activity - Total Score: 18    Goals:  Encounter Problems       Encounter Problems (Active)       ADLs       Patient will participate in bathing with supervision/set-up while seated at sink or standing at sink using PRN adaptive equipment.   (Progressing)       Start:  12/19/24    Expected End:  01/02/25            Patient will participate in upper and lower body dressing with supervision/set-up while sitting in chair or standing using PRN adaptive equipment.    (Progressing)       Start:  12/19/24    Expected End:  01/02/25            Patient will participate in all tolieting activities with supervision/set-up using an elevated toilet seat and grab bars.  (Progressing)       Start:  12/19/24    Expected End:  01/02/25               BALANCE       Pt will maintain dynamic standing balance during ADL task with supervision level of assistance in order to  demonstrate decreased risk of falling and improved postural control. (Progressing)       Start:  12/19/24    Expected End:  01/02/25               COGNITION/SAFETY       Pt will follow simple commands 100% of the time during OT tx session to increase independence and safety with ADLs.  (Progressing)       Start:  12/19/24    Expected End:  01/02/25               TRANSFERS       Patient will participate in bed mobility with supervision/set-up to increase safety with mobility at home. (Progressing)       Start:  12/19/24    Expected End:  01/02/25            Patient will participate in sit to stand transfers with supervision/set-up using LRAD to increase safety with ADLs.  (Progressing)       Start:  12/19/24    Expected End:  01/02/25

## 2024-12-26 NOTE — PROGRESS NOTES
Agatha Saucedo is a 89 y.o. female on day 6 of admission presenting with Paroxysmal atrial fibrillation (Multi).      Subjective   Patient assessed at bedside; sitting up in bed. She is feeling well. No complaints.       Objective     Last Recorded Vitals  /65 (BP Location: Left arm, Patient Position: Lying)   Pulse 80   Temp 36.5 °C (97.7 °F) (Temporal)   Resp 16   Wt 63.2 kg (139 lb 6.4 oz)   SpO2 99%   Intake/Output last 3 Shifts:    Intake/Output Summary (Last 24 hours) at 12/26/2024 1047  Last data filed at 12/26/2024 0900  Gross per 24 hour   Intake 420 ml   Output --   Net 420 ml       Admission Weight  Weight: 63.2 kg (139 lb 6.4 oz) (12/18/24 1315)    Daily Weight  12/18/24 : 63.2 kg (139 lb 6.4 oz)    Image Results      Physical Exam  Vitals reviewed.   Constitutional:       Appearance: Normal appearance. She is normal weight.   HENT:      Head: Normocephalic and atraumatic.      Right Ear: External ear normal.      Left Ear: External ear normal.      Nose: Nose normal.      Mouth/Throat:      Mouth: Mucous membranes are moist.      Pharynx: Oropharynx is clear.   Eyes:      Conjunctiva/sclera: Conjunctivae normal.      Pupils: Pupils are equal, round, and reactive to light.   Cardiovascular:      Rate and Rhythm: Normal rate and regular rhythm.      Pulses: Normal pulses.      Heart sounds: Normal heart sounds.   Pulmonary:      Effort: Pulmonary effort is normal.      Breath sounds: Normal breath sounds.   Abdominal:      General: Bowel sounds are normal.      Palpations: Abdomen is soft.   Musculoskeletal:         General: Normal range of motion.      Cervical back: Normal range of motion and neck supple.   Skin:     General: Skin is warm and dry.   Neurological:      Mental Status: She is alert. Mental status is at baseline. She is disoriented.   Psychiatric:         Mood and Affect: Mood normal.         Behavior: Behavior normal.         Relevant Results    Scheduled  medications  amLODIPine, 10 mg, oral, Daily  apixaban, 2.5 mg, oral, q12h  metoprolol tartrate, 25 mg, oral, BID  pantoprazole, 40 mg, oral, Daily before breakfast  QUEtiapine, 25 mg, oral, Nightly  raloxifene, 60 mg, oral, Daily  traZODone, 50 mg, oral, Nightly      Continuous medications     PRN medications  PRN medications: acetaminophen, LORazepam, metoprolol, ondansetron, polyethylene glycol    No results found for this or any previous visit (from the past 24 hours).                Assessment/Plan        Assessment & Plan  Paroxysmal atrial fibrillation (Multi)    Benign essential HTN    Insomnia    Age-related osteoporosis without current pathological fracture    Acute-on-chronic kidney injury (CMS-HCC)    Atrial fibrillation, new onset (Multi)    Ischemic stroke diagnosed during current admission (Multi)      Fall  - nursing report fall in room on 12/24/24  - no injuries noted  - PT/OT evaluation recommending moderate level therapy     Acute Ischemic Stroke (Left parietotemporal infarct)   Paroxysmal atrial fibrillation, new onset  Essential HTN  - 12/20 MRI brain w/o contrast  - Acute infarct involving the left parietal and temporal lobes.   - neuro tele recs appreciated - check lipid panel in the am and start statin for LDL > 70   - son states pt lack of coordination and right sided weakness significantly worse as well as clarity of speech   -   - serial trop 8 > 8  - EKG reviewed, atrial fibrillation with   - given metoprolol tartrate in the ED  - converted to NSR in the ED  - continue metoprolol tartrate 25 mg BID, amlodipine 10 mg daily  - continue apixaban 2.5 mg BID - (lower dose given fall risk and discussion with the son)   - hold losartan d/t renal function  - cardiac monitoring via telemetry  - cardiology consult  - echocardiogram: normal LVSF with an EF of 55-60%  - Equal strength without neglect~expressive aphasia~slurred speech~improving     Acute on chronic Kidney injury, stage 3a,  resolved  - baseline creatine 1.2  - creatine on admission 1.62; today creatine 1.17  - given 500 mL bolus of IVF in the Ed  - hold losartan  - avoid nephrotoxic medication  - monitor renal function     Osteoporosis  - continue raloxifene 60 mg daily     Insomnia  - continue trazodone 50 mg daily     PUD ppx  - continue pantoprazole 40 mg daily     DVT ppx  - continue apixaban 2.5 mg BID     Disposition: Patient requires inpatient care            Clementina Mckeon, GEMMA-CNP

## 2024-12-27 VITALS
DIASTOLIC BLOOD PRESSURE: 57 MMHG | OXYGEN SATURATION: 94 % | WEIGHT: 139.4 LBS | RESPIRATION RATE: 18 BRPM | SYSTOLIC BLOOD PRESSURE: 107 MMHG | BODY MASS INDEX: 27.37 KG/M2 | HEIGHT: 60 IN | TEMPERATURE: 97.2 F | HEART RATE: 74 BPM

## 2024-12-27 PROBLEM — N18.9 ACUTE-ON-CHRONIC KIDNEY INJURY (CMS-HCC): Status: RESOLVED | Noted: 2024-12-18 | Resolved: 2024-12-27

## 2024-12-27 PROBLEM — N17.9 ACUTE-ON-CHRONIC KIDNEY INJURY (CMS-HCC): Status: RESOLVED | Noted: 2024-12-18 | Resolved: 2024-12-27

## 2024-12-27 PROCEDURE — 2500000001 HC RX 250 WO HCPCS SELF ADMINISTERED DRUGS (ALT 637 FOR MEDICARE OP): Mod: IPSPLIT | Performed by: NURSE PRACTITIONER

## 2024-12-27 PROCEDURE — 94760 N-INVAS EAR/PLS OXIMETRY 1: CPT | Mod: IPSPLIT

## 2024-12-27 PROCEDURE — 99238 HOSP IP/OBS DSCHRG MGMT 30/<: CPT | Performed by: NURSE PRACTITIONER

## 2024-12-27 RX ORDER — AMLODIPINE BESYLATE 10 MG/1
10 TABLET ORAL DAILY
Start: 2024-12-28

## 2024-12-27 RX ORDER — LORAZEPAM 0.5 MG/1
0.5 TABLET ORAL EVERY 8 HOURS PRN
Start: 2024-12-27

## 2024-12-27 RX ORDER — QUETIAPINE FUMARATE 25 MG/1
25 TABLET, FILM COATED ORAL NIGHTLY
Start: 2024-12-27

## 2024-12-27 RX ADMIN — RALOXIFENE 60 MG: 60 TABLET ORAL at 08:32

## 2024-12-27 RX ADMIN — METOPROLOL TARTRATE 25 MG: 25 TABLET, FILM COATED ORAL at 08:32

## 2024-12-27 RX ADMIN — APIXABAN 2.5 MG: 2.5 TABLET, FILM COATED ORAL at 08:32

## 2024-12-27 RX ADMIN — AMLODIPINE BESYLATE 10 MG: 5 TABLET ORAL at 08:32

## 2024-12-27 ASSESSMENT — COGNITIVE AND FUNCTIONAL STATUS - GENERAL
DRESSING REGULAR UPPER BODY CLOTHING: A LITTLE
CLIMB 3 TO 5 STEPS WITH RAILING: A LOT
DRESSING REGULAR LOWER BODY CLOTHING: A LITTLE
WALKING IN HOSPITAL ROOM: A LITTLE
PERSONAL GROOMING: A LITTLE
MOBILITY SCORE: 20
HELP NEEDED FOR BATHING: A LITTLE
STANDING UP FROM CHAIR USING ARMS: A LITTLE
EATING MEALS: A LITTLE
DAILY ACTIVITIY SCORE: 19

## 2024-12-27 ASSESSMENT — PAIN SCALES - GENERAL: PAINLEVEL_OUTOF10: 0 - NO PAIN

## 2024-12-27 NOTE — PROGRESS NOTES
12/27/24 1507   Discharge Planning   Living Arrangements Other (Comment)  ([place])   Assistance Needed Skilled Nursing need. Admission to [facility].   Type of Residence Skilled nursing facility   Does the patient need discharge transport arranged? Yes   RoundTrip coordination needed? Yes   Has discharge transport been arranged? Yes   What day is the transport expected? 12/27/24   What time is the transport expected? 0315   Patient Choice   Provider Choice list and CMS website (https://medicare.gov/care-compare#search) for post-acute Quality and Resource Measure Data were provided and reviewed with: Family;Patient   Patient / Family choosing to utilize agency / facility established prior to hospitalization No   Intensity of Service   Intensity of Service 0-30 min     Patient is being discharged to [facility] today for care. Spoke to son today and provided the discharge time to son on her arrival to the facility by round trip transportation. JAIDEN WATTS

## 2024-12-27 NOTE — PROGRESS NOTES
"Occupational Therapy                 Therapy Communication Note    Patient Name: Agatha Saucedo  MRN: [id number]  Department[facility]  Room: [address] [place] [address]  Today's Date: 12/27/2024     Discipline: Occupational Therapy    OT Missed Visit: Yes     Missed Visit Reason: Missed Visit Reason: Patient refused    Missed Time: Attempt    Comment: Patient refusing to participate with OT at this time. Patient reporting, \"I just don't want to do that today\". No OT completed at this time.     Attempt time: 0854  "

## 2024-12-27 NOTE — CARE PLAN
The patient's goals for the shift include      The clinical goals for the shift include patient will use call bell and wait for assistance throughout shift    Over the shift, the patient did not make progress toward the following goals. Barriers to progression include cognition. Recommendations to address these barriers include redirection.

## 2024-12-27 NOTE — DISCHARGE SUMMARY
Discharge Diagnosis  Paroxysmal atrial fibrillation (Multi)    Issues Requiring Follow-Up  None    Discharge Meds     Medication List      START taking these medications     apixaban 2.5 mg tablet; Commonly known as: Eliquis; Take 2 tablets (5   mg) by mouth 2 times a day.   LORazepam 0.5 mg tablet; Commonly known as: Ativan; Take 1 tablet (0.5   mg) by mouth every 8 hours if needed for anxiety.   metoprolol tartrate 25 mg tablet; Commonly known as: Lopressor; Take 1   tablet (25 mg) by mouth 2 times a day.   QUEtiapine 25 mg tablet; Commonly known as: SEROquel; Take 1 tablet (25   mg) by mouth once daily at bedtime.     CHANGE how you take these medications     * amLODIPine 10 mg tablet; Commonly known as: Norvasc; What changed:   Another medication with the same name was added. Make sure you understand   how and when to take each.   * amLODIPine 10 mg tablet; Commonly known as: Norvasc; Take 1 tablet (10   mg) by mouth once daily.; Start taking on: December 28, 2024; What   changed: You were already taking a medication with the same name, and this   prescription was added. Make sure you understand how and when to take   each.  * This list has 2 medication(s) that are the same as other medications   prescribed for you. Read the directions carefully, and ask your doctor or   other care provider to review them with you.     CONTINUE taking these medications     multivitamin tablet   raloxifene 60 mg tablet; Commonly known as: Evista   traZODone 50 mg tablet; Commonly known as: Desyrel     STOP taking these medications     cephalexin 500 mg capsule; Commonly known as: Keflex   cyanocobalamin 1,000 mcg tablet; Commonly known as: Vitamin B-12   ferrous sulfate 325 (65 Fe) MG EC tablet   losartan 50 mg tablet; Commonly known as: Cozaar       Test Results Pending At Discharge  Pending Labs       Order Current Status    Extra Urine Gray Tube Collected (12/19/24 0305)    Lavender Top Collected (12/18/24 1317)    PST Top  Collected (12/18/24 1769)    [place] draw In process    Urinalysis with Reflex Culture and Microscopic In process            Hospital Course   tatyana Saucedo is a 89 y.o. female presenting with a complaint of generalized weakness. She is a poor historian; history obtained from the ED note and old charts. She is from assisted living; she was sent to the ED for evaluation due to a low BP at the facility.   Acute Ischemic Stroke (Left parietotemporal infarct)   Paroxysmal atrial fibrillation, new onset  Essential HTN  - 12/20 MRI brain w/o contrast  - Acute infarct involving the left parietal and temporal lobes.   - neuro tele recs appreciated - check lipid panel in the am and start statin for LDL > 70   - son states pt lack of coordination and right sided weakness significantly worse as well as clarity of speech   -   - serial trop 8 > 8  - EKG reviewed, atrial fibrillation with   - given metoprolol tartrate in the ED  - converted to NSR in the ED  - continue metoprolol tartrate 25 mg BID, amlodipine 10 mg daily  - continue apixaban 2.5 mg BID - (lower dose given fall risk and discussion with the son)   - hold losartan d/t renal function  - cardiac monitoring via telemetry  - cardiology consult  - echocardiogram: normal LVSF with an EF of 55-60%  - Equal strength without neglect~expressive aphasia~slurred speech~improving     Acute on chronic Kidney injury, stage 3a, resolved  - baseline creatine 1.2  - creatine on admission 1.62; today creatine 1.17  - given 500 mL bolus of IVF in the Ed  - hold losartan  - avoid nephrotoxic medication  - monitor renal function     Osteoporosis  - continue raloxifene 60 mg daily     Insomnia  - continue trazodone 50 mg daily    Pertinent Physical Exam At Time of Discharge  Vitals reviewed.   Constitutional:       Appearance: Normal appearance. She is normal weight.   HENT:      Head: Normocephalic and atraumatic.      Right Ear: External ear normal.      Left Ear:  External ear normal.      Nose: Nose normal.      Mouth/Throat:      Mouth: Mucous membranes are moist.      Pharynx: Oropharynx is clear.   Eyes:      Conjunctiva/sclera: Conjunctivae normal.      Pupils: Pupils are equal, round, and reactive to light.   Cardiovascular:      Rate and Rhythm: Normal rate and regular rhythm.      Pulses: Normal pulses.      Heart sounds: Normal heart sounds.   Pulmonary:      Effort: Pulmonary effort is normal.      Breath sounds: Normal breath sounds.   Abdominal:      General: Bowel sounds are normal.      Palpations: Abdomen is soft.   Musculoskeletal:         General: Normal range of motion.      Cervical back: Normal range of motion and neck supple.   Skin:     General: Skin is warm and dry.   Neurological:      Mental Status: She is alert. Mental status is at baseline. She is disoriented.   Psychiatric:         Mood and Affect: Mood normal.         Behavior: Behavior normal.     Outpatient Follow-Up  No future appointments.      Cinthia Mead, APRN-CNP

## 2024-12-27 NOTE — NURSING NOTE
Uneventful shift. [facility] transporting to [facility] for rehab via [facility]. Report given to nurse [place]. DNR faxed to [phone].

## 2024-12-27 NOTE — CARE PLAN
The clinical goals for the shift include Pt will use call bell for assistance and wait for assistance throughout the shift.    Over the shift, the patient did not make progress toward the following goals. Barriers to progression include forgetful. Pt resting in bed quietly the night. VSS RA. Remain confuse and forgetful. Bed alarm. Tylenol PRN given @ bedtime. No sign of distress and continue POC. Call light within reach.

## 2025-03-05 LAB
ATRIAL RATE: 72 BPM
P AXIS: 39 DEGREES
P OFFSET: 214 MS
P ONSET: 161 MS
PR INTERVAL: 132 MS
Q ONSET: 227 MS
QRS COUNT: 12 BEATS
QRS DURATION: 80 MS
QT INTERVAL: 412 MS
QTC CALCULATION(BAZETT): 451 MS
QTC FREDERICIA: 438 MS
R AXIS: 30 DEGREES
T AXIS: 38 DEGREES
T OFFSET: 433 MS
VENTRICULAR RATE: 72 BPM

## 2025-04-07 ENCOUNTER — APPOINTMENT (OUTPATIENT)
Dept: CARDIOLOGY | Facility: HOSPITAL | Age: OVER 89
End: 2025-04-07
Payer: MEDICARE

## 2025-04-07 ENCOUNTER — APPOINTMENT (OUTPATIENT)
Dept: RADIOLOGY | Facility: HOSPITAL | Age: OVER 89
End: 2025-04-07
Payer: MEDICARE

## 2025-04-07 ENCOUNTER — HOSPITAL ENCOUNTER (EMERGENCY)
Facility: HOSPITAL | Age: OVER 89
Discharge: HOME | End: 2025-04-07
Attending: EMERGENCY MEDICINE
Payer: MEDICARE

## 2025-04-07 VITALS
WEIGHT: 138.23 LBS | BODY MASS INDEX: 22.22 KG/M2 | HEIGHT: 66 IN | SYSTOLIC BLOOD PRESSURE: 145 MMHG | OXYGEN SATURATION: 96 % | RESPIRATION RATE: 19 BRPM | TEMPERATURE: 97.9 F | HEART RATE: 67 BPM | DIASTOLIC BLOOD PRESSURE: 71 MMHG

## 2025-04-07 DIAGNOSIS — I48.92 ATRIAL FLUTTER WITH RAPID VENTRICULAR RESPONSE (MULTI): ICD-10-CM

## 2025-04-07 DIAGNOSIS — N30.00 ACUTE CYSTITIS WITHOUT HEMATURIA: Primary | ICD-10-CM

## 2025-04-07 LAB
ALBUMIN SERPL BCP-MCNC: 4.5 G/DL (ref 3.4–5)
ALP SERPL-CCNC: 48 U/L (ref 33–136)
ALT SERPL W P-5'-P-CCNC: 10 U/L (ref 7–45)
ANION GAP SERPL CALC-SCNC: 10 MMOL/L (ref 10–20)
APPEARANCE UR: ABNORMAL
AST SERPL W P-5'-P-CCNC: 17 U/L (ref 9–39)
ATRIAL RATE: 268 BPM
ATRIAL RATE: 75 BPM
BASOPHILS # BLD AUTO: 0.04 X10*3/UL (ref 0–0.1)
BASOPHILS NFR BLD AUTO: 0.4 %
BILIRUB SERPL-MCNC: 0.4 MG/DL (ref 0–1.2)
BILIRUB UR STRIP.AUTO-MCNC: NEGATIVE MG/DL
BNP SERPL-MCNC: 441 PG/ML (ref 0–99)
BUN SERPL-MCNC: 22 MG/DL (ref 6–23)
CALCIUM SERPL-MCNC: 9.8 MG/DL (ref 8.6–10.3)
CARDIAC TROPONIN I PNL SERPL HS: 4 NG/L (ref 0–13)
CARDIAC TROPONIN I PNL SERPL HS: 4 NG/L (ref 0–13)
CHLORIDE SERPL-SCNC: 98 MMOL/L (ref 98–107)
CO2 SERPL-SCNC: 26 MMOL/L (ref 21–32)
COLOR UR: YELLOW
CREAT SERPL-MCNC: 1.26 MG/DL (ref 0.5–1.05)
D DIMER PPP FEU-MCNC: 227 NG/ML FEU
EGFRCR SERPLBLD CKD-EPI 2021: 41 ML/MIN/1.73M*2
EOSINOPHIL # BLD AUTO: 0.04 X10*3/UL (ref 0–0.4)
EOSINOPHIL NFR BLD AUTO: 0.4 %
ERYTHROCYTE [DISTWIDTH] IN BLOOD BY AUTOMATED COUNT: 13.5 % (ref 11.5–14.5)
GLUCOSE SERPL-MCNC: 104 MG/DL (ref 74–99)
GLUCOSE UR STRIP.AUTO-MCNC: NEGATIVE MG/DL
HCT VFR BLD AUTO: 43.2 % (ref 36–46)
HGB BLD-MCNC: 14.1 G/DL (ref 12–16)
HOLD SPECIMEN: NORMAL
HYALINE CASTS #/AREA URNS AUTO: ABNORMAL /LPF
IMM GRANULOCYTES # BLD AUTO: 0.01 X10*3/UL (ref 0–0.5)
IMM GRANULOCYTES NFR BLD AUTO: 0.1 % (ref 0–0.9)
KETONES UR STRIP.AUTO-MCNC: NEGATIVE MG/DL
LEUKOCYTE ESTERASE UR QL STRIP.AUTO: ABNORMAL
LYMPHOCYTES # BLD AUTO: 2.63 X10*3/UL (ref 0.8–3)
LYMPHOCYTES NFR BLD AUTO: 29.6 %
MCH RBC QN AUTO: 29.9 PG (ref 26–34)
MCHC RBC AUTO-ENTMCNC: 32.6 G/DL (ref 32–36)
MCV RBC AUTO: 92 FL (ref 80–100)
MONOCYTES # BLD AUTO: 1.05 X10*3/UL (ref 0.05–0.8)
MONOCYTES NFR BLD AUTO: 11.8 %
MUCOUS THREADS #/AREA URNS AUTO: ABNORMAL /LPF
NEUTROPHILS # BLD AUTO: 5.12 X10*3/UL (ref 1.6–5.5)
NEUTROPHILS NFR BLD AUTO: 57.7 %
NITRITE UR QL STRIP.AUTO: NEGATIVE
NRBC BLD-RTO: 0 /100 WBCS (ref 0–0)
P AXIS: 106 DEGREES
P AXIS: 2 DEGREES
P OFFSET: 217 MS
P ONSET: 161 MS
PH UR STRIP.AUTO: 6 [PH]
PLATELET # BLD AUTO: 287 X10*3/UL (ref 150–450)
POTASSIUM SERPL-SCNC: 4 MMOL/L (ref 3.5–5.3)
PR INTERVAL: 134 MS
PROT SERPL-MCNC: 7.1 G/DL (ref 6.4–8.2)
PROT UR STRIP.AUTO-MCNC: NEGATIVE MG/DL
Q ONSET: 228 MS
Q ONSET: 229 MS
QRS COUNT: 13 BEATS
QRS COUNT: 22 BEATS
QRS DURATION: 66 MS
QRS DURATION: 76 MS
QT INTERVAL: 302 MS
QT INTERVAL: 404 MS
QTC CALCULATION(BAZETT): 450 MS
QTC CALCULATION(BAZETT): 451 MS
QTC FREDERICIA: 394 MS
QTC FREDERICIA: 434 MS
R AXIS: 11 DEGREES
R AXIS: 15 DEGREES
RBC # BLD AUTO: 4.71 X10*6/UL (ref 4–5.2)
RBC # UR STRIP.AUTO: NEGATIVE MG/DL
RBC #/AREA URNS AUTO: ABNORMAL /HPF
SODIUM SERPL-SCNC: 130 MMOL/L (ref 136–145)
SP GR UR STRIP.AUTO: 1.02
SQUAMOUS #/AREA URNS AUTO: ABNORMAL /HPF
T AXIS: -7 DEGREES
T AXIS: 190 DEGREES
T OFFSET: 380 MS
T OFFSET: 430 MS
UROBILINOGEN UR STRIP.AUTO-MCNC: <2 MG/DL
VENTRICULAR RATE: 134 BPM
VENTRICULAR RATE: 75 BPM
WBC # BLD AUTO: 8.9 X10*3/UL (ref 4.4–11.3)
WBC #/AREA URNS AUTO: >50 /HPF
WBC CLUMPS #/AREA URNS AUTO: ABNORMAL /HPF

## 2025-04-07 PROCEDURE — 93005 ELECTROCARDIOGRAM TRACING: CPT

## 2025-04-07 PROCEDURE — 96374 THER/PROPH/DIAG INJ IV PUSH: CPT

## 2025-04-07 PROCEDURE — 84075 ASSAY ALKALINE PHOSPHATASE: CPT | Performed by: EMERGENCY MEDICINE

## 2025-04-07 PROCEDURE — 85379 FIBRIN DEGRADATION QUANT: CPT | Performed by: EMERGENCY MEDICINE

## 2025-04-07 PROCEDURE — 99285 EMERGENCY DEPT VISIT HI MDM: CPT | Mod: 25 | Performed by: EMERGENCY MEDICINE

## 2025-04-07 PROCEDURE — 81001 URINALYSIS AUTO W/SCOPE: CPT | Performed by: EMERGENCY MEDICINE

## 2025-04-07 PROCEDURE — 36415 COLL VENOUS BLD VENIPUNCTURE: CPT | Performed by: EMERGENCY MEDICINE

## 2025-04-07 PROCEDURE — 71045 X-RAY EXAM CHEST 1 VIEW: CPT | Performed by: RADIOLOGY

## 2025-04-07 PROCEDURE — 71045 X-RAY EXAM CHEST 1 VIEW: CPT

## 2025-04-07 PROCEDURE — 87086 URINE CULTURE/COLONY COUNT: CPT | Mod: CONLAB | Performed by: EMERGENCY MEDICINE

## 2025-04-07 PROCEDURE — 2500000004 HC RX 250 GENERAL PHARMACY W/ HCPCS (ALT 636 FOR OP/ED): Performed by: EMERGENCY MEDICINE

## 2025-04-07 PROCEDURE — 84484 ASSAY OF TROPONIN QUANT: CPT | Performed by: EMERGENCY MEDICINE

## 2025-04-07 PROCEDURE — 85025 COMPLETE CBC W/AUTO DIFF WBC: CPT | Performed by: EMERGENCY MEDICINE

## 2025-04-07 PROCEDURE — 83880 ASSAY OF NATRIURETIC PEPTIDE: CPT | Performed by: EMERGENCY MEDICINE

## 2025-04-07 RX ORDER — FERROUS SULFATE 325(65) MG
325 TABLET ORAL
COMMUNITY

## 2025-04-07 RX ORDER — METOPROLOL TARTRATE 25 MG/1
25 TABLET, FILM COATED ORAL NIGHTLY
COMMUNITY

## 2025-04-07 RX ORDER — SODIUM CHLORIDE 9 MG/ML
INJECTION, SOLUTION INTRAVENOUS
Status: COMPLETED
Start: 2025-04-07 | End: 2025-04-07

## 2025-04-07 RX ORDER — CEPHALEXIN 500 MG/1
500 CAPSULE ORAL 3 TIMES DAILY
Qty: 21 CAPSULE | Refills: 0 | Status: SHIPPED | OUTPATIENT
Start: 2025-04-07 | End: 2025-04-14

## 2025-04-07 RX ORDER — SODIUM CHLORIDE 9 MG/ML
100 INJECTION, SOLUTION INTRAVENOUS CONTINUOUS
Status: DISCONTINUED | OUTPATIENT
Start: 2025-04-07 | End: 2025-04-07 | Stop reason: HOSPADM

## 2025-04-07 RX ORDER — CEFTRIAXONE 1 G/50ML
1 INJECTION, SOLUTION INTRAVENOUS ONCE
Status: COMPLETED | OUTPATIENT
Start: 2025-04-07 | End: 2025-04-07

## 2025-04-07 RX ORDER — DILTIAZEM HYDROCHLORIDE 5 MG/ML
INJECTION INTRAVENOUS
Status: COMPLETED
Start: 2025-04-07 | End: 2025-04-07

## 2025-04-07 RX ORDER — PANTOPRAZOLE SODIUM 40 MG/1
40 TABLET, DELAYED RELEASE ORAL
COMMUNITY

## 2025-04-07 RX ORDER — LOSARTAN POTASSIUM 50 MG/1
50 TABLET ORAL DAILY
COMMUNITY

## 2025-04-07 RX ADMIN — CEFTRIAXONE 1 G: 1 INJECTION, SOLUTION INTRAVENOUS at 13:07

## 2025-04-07 ASSESSMENT — PAIN SCALES - GENERAL
PAINLEVEL_OUTOF10: 0 - NO PAIN

## 2025-04-07 ASSESSMENT — PAIN - FUNCTIONAL ASSESSMENT
PAIN_FUNCTIONAL_ASSESSMENT: 0-10
PAIN_FUNCTIONAL_ASSESSMENT: 0-10

## 2025-04-07 ASSESSMENT — COLUMBIA-SUICIDE SEVERITY RATING SCALE - C-SSRS
1. IN THE PAST MONTH, HAVE YOU WISHED YOU WERE DEAD OR WISHED YOU COULD GO TO SLEEP AND NOT WAKE UP?: NO
2. HAVE YOU ACTUALLY HAD ANY THOUGHTS OF KILLING YOURSELF?: NO
6. HAVE YOU EVER DONE ANYTHING, STARTED TO DO ANYTHING, OR PREPARED TO DO ANYTHING TO END YOUR LIFE?: NO

## 2025-04-07 ASSESSMENT — PAIN DESCRIPTION - PROGRESSION: CLINICAL_PROGRESSION: GRADUALLY WORSENING

## 2025-04-07 NOTE — DISCHARGE INSTRUCTIONS
Continue home medications.    Take Keflex as prescribed for urinary tract infection.    Follow-up with your cardiologist for further evaluation and treatment this week.    Return for worsening symptoms or concerns.

## 2025-04-07 NOTE — ED PROVIDER NOTES
" [facility]   ED  Provider Note  4/7/2025 11:26 AM  AC05/AC05      Chief Complaint   Patient presents with    Shortness of Breath     [facility] with son for \"respiratory issues\". Son reports the nurse says pt \"looks worse\" since Thursday and cannot walk short distances without SOB, stopping several times, no energy. No cough. No pain.         History of Present Illness:   Agatha Saucedo is a 89 y.o. female presenting to the ED for dyspnea, beginning 3 days ago.  The complaint has been persistent, moderate in severity, and worsened by walking.  Patient has been short of breath for the past 3 days at the nursing home.  She denies chest pain fever or chills.  She does have an occasional cough.  She has history of paroxysmal atrial fibrillation with similar presentations in the past.  She is on a heart rate of 135 bpm with atrial fibrillation.  She denies recent change in medication or diet.      Review of Systems:   Pertinent positives and review of systems as noted above.  Remaining 10 review of systems is negative or noncontributory to today's episode of care.  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 No pertinent surgical history.     Social History:   Social History     Social History Narrative    Not on file        Family History: family history is not on file. Unless otherwise noted, family history is non " contributory    Patient's Medications   New Prescriptions    No medications on file   Previous Medications    AMLODIPINE (NORVASC) 10 MG TABLET    Take 1 tablet (10 mg) by mouth once daily.    AMLODIPINE (NORVASC) 10 MG TABLET    Take 1 tablet (10 mg) by mouth once daily.    APIXABAN (ELIQUIS) 2.5 MG TABLET    Take 2 tablets (5 mg) by mouth 2 times a day.    LORAZEPAM (ATIVAN) 0.5 MG TABLET    Take 1 tablet (0.5 mg) by mouth every 8 hours if needed for anxiety.    METOPROLOL TARTRATE (LOPRESSOR) 25 MG TABLET    Take 1 tablet (25 mg) by mouth 2 times a day.    MULTIVITAMIN TABLET    Take 1 tablet by mouth once daily.    QUETIAPINE (SEROQUEL) 25 MG TABLET    Take 1 tablet (25 mg) by mouth once daily at bedtime.    RALOXIFENE (EVISTA) 60 MG TABLET    Take 1 tablet (60 mg) by mouth once daily.    TRAZODONE (DESYREL) 50 MG TABLET    Take 1 tablet (50 mg) by mouth once daily at bedtime.   Modified Medications    No medications on file   Discontinued Medications    No medications on file      The patient’s home medications have been reviewed.    Allergies: Fosamax [alendronate], Vesicare [solifenacin], and Zestoretic [lisinopril-hydrochlorothiazide]    -------------------------------------------------- RESULTS -------------------------------------------------  All laboratory and radiology results have been personally reviewed by myself   LABS:  Labs Reviewed   COMPREHENSIVE METABOLIC PANEL - Abnormal       Result Value    Glucose 104 (*)     Sodium 130 (*)     Potassium 4.0      Chloride 98      Bicarbonate 26      Anion Gap 10      Urea Nitrogen 22      Creatinine 1.26 (*)     eGFR 41 (*)     Calcium 9.8      Albumin 4.5      Alkaline Phosphatase 48      Total Protein 7.1      AST 17      Bilirubin, Total 0.4      ALT 10     CBC WITH AUTO DIFFERENTIAL - Abnormal    WBC 8.9      nRBC 0.0      RBC 4.71      Hemoglobin 14.1      Hematocrit 43.2      MCV 92      MCH 29.9      MCHC 32.6      RDW 13.5      Platelets 287       Neutrophils % 57.7      Immature Granulocytes %, Automated 0.1      Lymphocytes % 29.6      Monocytes % 11.8      Eosinophils % 0.4      Basophils % 0.4      Neutrophils Absolute 5.12      Immature Granulocytes Absolute, Automated 0.01      Lymphocytes Absolute 2.63      Monocytes Absolute 1.05 (*)     Eosinophils Absolute 0.04      Basophils Absolute 0.04     B-TYPE NATRIURETIC PEPTIDE - Abnormal     (*)     Narrative:        <100 pg/mL - Heart failure unlikely  100-299 pg/mL - Intermediate probability of acute heart                  failure exacerbation. Correlate with clinical                  context and patient history.    >=300 pg/mL - Heart Failure likely. Correlate with clinical                  context and patient history.    BNP testing is performed using different testing methodology at [facility] than at other [facility]. Direct result comparisons should only be made within the same method.      URINALYSIS WITH REFLEX CULTURE AND MICROSCOPIC - Abnormal    Color, Urine Yellow      Appearance, Urine Hazy (*)     Specific Gravity, Urine 1.016      pH, Urine 6.0      Protein, Urine NEGATIVE      Glucose, Urine NEGATIVE      Blood, Urine NEGATIVE      Ketones, Urine NEGATIVE      Bilirubin, Urine NEGATIVE      Urobilinogen, Urine <2.0      Nitrite, Urine NEGATIVE      Leukocyte Esterase, Urine MODERATE (2+) (*)    MICROSCOPIC ONLY, URINE - Abnormal    WBC, Urine >50 (*)     WBC Clumps, Urine MODERATE      RBC, Urine 1-2      Squamous Epithelial Cells, Urine 1-9 (SPARSE)      Mucus, Urine FEW      Hyaline Casts, Urine OCCASIONAL (*)    D-DIMER, VTE EXCLUSION - Normal    D-Dimer, Quantitative VTE Exclusion 227      Narrative:     The VTE Exclusion D-Dimer assay is reported in ng/mL Fibrinogen Equivalent Units (FEU).    Per 's instructions for use, a value of less than 500 ng/mL (FEU) may help to exclude DVT or PE in outpatients when the assay is used with a clinical  pretest probability assessment.(AE must utilize and document eCalc 'Wells Score Deep Vein Thrombosis Risk' for DVT exclusion only. Emergency Department should utilize  Guidelines for Emergency Department Use of the VTE Exclusion D-Dimer and Clinical Pretest probability assessment model for DVT or PE exclusion.)   SERIAL TROPONIN-INITIAL - Normal    Troponin I, High Sensitivity 4      Narrative:     Less than 99th percentile of normal range cutoff-  Female and children under 18 years old <14 ng/L; Male <21 ng/L: Negative  Repeat testing should be performed if clinically indicated.     Female and children under 18 years old 14-50 ng/L; Male 21-50 ng/L:  Consistent with possible cardiac damage and possible increased clinical   risk. Serial measurements may help to assess extent of myocardial damage.     >50 ng/L: Consistent with cardiac damage, increased clinical risk and  myocardial infarction. Serial measurements may help assess extent of   myocardial damage.      NOTE: Children less than 1 year old may have higher baseline troponin   levels and results should be interpreted in conjunction with the overall   clinical context.     NOTE: Troponin I testing is performed using a different   testing methodology at [facility] than at other   [facility]. Direct result comparisons should only   be made within the same method.   URINE CULTURE   TROPONIN SERIES- (INITIAL, 1 HR)    Narrative:     The following orders were created for panel order Troponin I Series, High Sensitivity (0, 1 HR).  Procedure                               Abnormality         Status                     ---------                               -----------         ------                     Troponin I, High Sensiti...[[id number]]  Normal              Final result               Troponin, High Sensitivi...[[id number]]                                                   Please view results for these tests on the individual orders.  "  URINALYSIS WITH REFLEX CULTURE AND MICROSCOPIC    Narrative:     The following orders were created for panel order Urinalysis with Reflex Culture and Microscopic.  Procedure                               Abnormality         Status                     ---------                               -----------         ------                     Urinalysis with Reflex C...[[id number]]  Abnormal            Final result               Extra Urine Gray Tube[[id number]]                            In process                   Please view results for these tests on the individual orders.   EXTRA URINE GRAY TUBE   SERIAL TROPONIN, 1 HOUR   GRAY TOP     EKG: Atrial flutter with 2 1 AV conduction at 134 bpm, consider inferior ischemia T wave inversions.  Consider anterolateral ischemia with flat to depressed T waves.  No acute ST elevations.  Interpreted by DIOR Ozuna MD    EKG #2 sinus rhythm at 75 bpm, normal axis, no intervals, no acute ST elevations.  Normal EKG.  Interpreted by DIOR Ozuna MD      RADIOLOGY:  Interpreted by Radiologist.  No orders to display       Encounter Date: 12/18/24   ECG 12 lead   Result Value    Ventricular Rate 72    Atrial Rate 72    CT Interval 132    QRS Duration 80    QT Interval 412    QTC Calculation(Bazett) 451    P Axis 39    R Axis 30    T Axis 38    QRS Count 12    Q Onset 227    P Onset 161    P Offset 214    T Offset 433    QTC Fredericia 438    Narrative    Normal sinus rhythm  Normal ECG  When compared with ECG of 18-DEC-2024 15:15, (unconfirmed)  No significant change was found  Confirmed by Hussein Obrien ([id number]) on 3/5/2025 12:26:43 PM     ------------------------- NURSING NOTES AND VITALS REVIEWED ---------------------------   The nursing notes within the ED encounter and vital signs as below have been reviewed.   BP (!) 162/109   Pulse (!) 133   Resp 17   Ht 1.676 m (5' 6\")   Wt 62.7 kg (138 lb 3.7 oz)   SpO2 99%   BMI 22.31 kg/m²   Oxygen Saturation Interpretation: " Normal      ---------------------------------------------------PHYSICAL EXAM--------------------------------------  Physical Exam   Constitutional/General: Alert,  well appearing, non toxic in NAD  Head: Normocephalic and atraumatic  Eyes: PERRL, EOMI, conjunctiva normal, sclera non icteric  Mouth: Oropharynx clear, handling secretions, no trismus, no asymmetry of the posterior oropharynx or uvular edema  Neck: Supple, full ROM, non tender to palpation in the midline, no stridor, no crepitus, no meningeal signs  Respiratory: Lungs clear to auscultation bilaterally, no wheezes, rales, or rhonchi. Not in respiratory distress  Cardiovascular: Rapid rate. Irregular rhythm. No murmurs, gallops, or rubs. 2+ distal pulses  Chest: No chest wall tenderness  GI:  Abdomen Soft, Non tender, Non distended.  +BS. No organomegaly, no palpable masses,  No rebound, guarding, or rigidity.   Musculoskeletal: Moves all extremities x 4. Warm and well perfused, no clubbing, cyanosis, or edema. Capillary refill <3 seconds  Integument: skin warm and dry. No rashes.   Lymphatic: no lymphadenopathy noted  Neurologic: No focal deficits, symmetric strength 5/5 in the upper and lower extremities bilaterally  Psychiatric: Normal Affect    Procedures    ------------------------------ ED COURSE/MEDICAL DECISION MAKING----------------------  Diagnoses as of 04/07/25 1255   Acute cystitis without hematuria   Atrial flutter with rapid ventricular response (Multi)      Patient converted spontaneously to sinus rhythm before Cardizem was administered.  Repeat EKG showed a normal sinus rhythm at 75 beats minute without acute ST-T wave changes.  Patient troponin testing is normal.  She does have a urinary tract infection requiring antibiotics.  She received her first dose of antibiotics here in the ED.  She is stable for outpatient management and will return to nursing home for further care and treatment.      Medical Decision Making:   Turn to nursing  home    Diagnoses as of 04/07/25 1255   Acute cystitis without hematuria   Atrial flutter with rapid ventricular response (Multi)      Counseling:   The emergency provider has spoken with the patient and discussed today’s results, in addition to providing specific details for the plan of care and counseling regarding the diagnosis and prognosis.  Questions are answered at this time and they are agreeable with the plan.      --------------------------------- IMPRESSION AND DISPOSITION ---------------------------------        IMPRESSION  1. Acute cystitis without hematuria    2. Atrial flutter with rapid ventricular response (Multi)        DISPOSITION  Disposition: Discharge to nursing home  Patient condition is fair      Billing Provider Critical Care Time: 0 minutes     Buddy Ozuna MD  04/09/25 0809

## 2025-04-09 LAB — BACTERIA UR CULT: NORMAL
